# Patient Record
Sex: FEMALE | Race: BLACK OR AFRICAN AMERICAN | Employment: FULL TIME | URBAN - METROPOLITAN AREA
[De-identification: names, ages, dates, MRNs, and addresses within clinical notes are randomized per-mention and may not be internally consistent; named-entity substitution may affect disease eponyms.]

---

## 2017-03-14 ENCOUNTER — APPOINTMENT (OUTPATIENT)
Dept: GENERAL RADIOLOGY | Age: 22
End: 2017-03-14
Attending: PHYSICIAN ASSISTANT
Payer: COMMERCIAL

## 2017-03-14 ENCOUNTER — HOSPITAL ENCOUNTER (EMERGENCY)
Age: 22
Discharge: HOME | End: 2017-03-15
Attending: EMERGENCY MEDICINE
Payer: COMMERCIAL

## 2017-03-14 ENCOUNTER — APPOINTMENT (OUTPATIENT)
Dept: CT IMAGING | Age: 22
End: 2017-03-14
Attending: PHYSICIAN ASSISTANT
Payer: COMMERCIAL

## 2017-03-14 VITALS
WEIGHT: 192 LBS | TEMPERATURE: 97.7 F | RESPIRATION RATE: 20 BRPM | SYSTOLIC BLOOD PRESSURE: 110 MMHG | BODY MASS INDEX: 30.86 KG/M2 | OXYGEN SATURATION: 95 % | DIASTOLIC BLOOD PRESSURE: 61 MMHG | HEART RATE: 88 BPM | HEIGHT: 66 IN

## 2017-03-14 DIAGNOSIS — R10.13 ABDOMINAL PAIN, EPIGASTRIC: ICD-10-CM

## 2017-03-14 DIAGNOSIS — K52.9 ENTERITIS: Primary | ICD-10-CM

## 2017-03-14 DIAGNOSIS — R11.2 NON-INTRACTABLE VOMITING WITH NAUSEA, UNSPECIFIED VOMITING TYPE: ICD-10-CM

## 2017-03-14 LAB
ALBUMIN SERPL BCP-MCNC: 4 G/DL (ref 3.5–5)
ALBUMIN/GLOB SERPL: 0.9 {RATIO} (ref 1.1–2.2)
ALP SERPL-CCNC: 84 U/L (ref 45–117)
ALT SERPL-CCNC: 32 U/L (ref 12–78)
AMORPH CRY URNS QL MICRO: ABNORMAL
ANION GAP BLD CALC-SCNC: 10 MMOL/L (ref 5–15)
APPEARANCE UR: ABNORMAL
AST SERPL W P-5'-P-CCNC: 21 U/L (ref 15–37)
BACTERIA URNS QL MICRO: ABNORMAL /HPF
BASOPHILS # BLD AUTO: 0 K/UL (ref 0–0.1)
BASOPHILS # BLD: 0 % (ref 0–1)
BILIRUB SERPL-MCNC: 0.6 MG/DL (ref 0.2–1)
BILIRUB UR QL: NEGATIVE
BUN SERPL-MCNC: 19 MG/DL (ref 6–20)
BUN/CREAT SERPL: 24 (ref 12–20)
CALCIUM SERPL-MCNC: 8.8 MG/DL (ref 8.5–10.1)
CHLORIDE SERPL-SCNC: 102 MMOL/L (ref 97–108)
CO2 SERPL-SCNC: 27 MMOL/L (ref 21–32)
COLOR UR: ABNORMAL
CREAT SERPL-MCNC: 0.8 MG/DL (ref 0.55–1.02)
EOSINOPHIL # BLD: 0.1 K/UL (ref 0–0.4)
EOSINOPHIL NFR BLD: 1 % (ref 0–7)
EPITH CASTS URNS QL MICRO: ABNORMAL /LPF
ERYTHROCYTE [DISTWIDTH] IN BLOOD BY AUTOMATED COUNT: 13.1 % (ref 11.5–14.5)
GLOBULIN SER CALC-MCNC: 4.6 G/DL (ref 2–4)
GLUCOSE SERPL-MCNC: 96 MG/DL (ref 65–100)
GLUCOSE UR STRIP.AUTO-MCNC: NEGATIVE MG/DL
HCG UR QL: NEGATIVE
HCT VFR BLD AUTO: 44.4 % (ref 35–47)
HEMOCCULT STL QL: POSITIVE
HGB BLD-MCNC: 13.8 G/DL (ref 11.5–16)
HGB UR QL STRIP: NEGATIVE
KETONES UR QL STRIP.AUTO: 15 MG/DL
LEUKOCYTE ESTERASE UR QL STRIP.AUTO: ABNORMAL
LIPASE SERPL-CCNC: 122 U/L (ref 73–393)
LYMPHOCYTES # BLD AUTO: 9 % (ref 12–49)
LYMPHOCYTES # BLD: 1.1 K/UL (ref 0.8–3.5)
MCH RBC QN AUTO: 26.8 PG (ref 26–34)
MCHC RBC AUTO-ENTMCNC: 31.1 G/DL (ref 30–36.5)
MCV RBC AUTO: 86.4 FL (ref 80–99)
MONOCYTES # BLD: 0.9 K/UL (ref 0–1)
MONOCYTES NFR BLD AUTO: 7 % (ref 5–13)
NEUTS SEG # BLD: 9.8 K/UL (ref 1.8–8)
NEUTS SEG NFR BLD AUTO: 83 % (ref 32–75)
NITRITE UR QL STRIP.AUTO: NEGATIVE
PH UR STRIP: 7.5 [PH] (ref 5–8)
PLATELET # BLD AUTO: 252 K/UL (ref 150–400)
POTASSIUM SERPL-SCNC: 3.9 MMOL/L (ref 3.5–5.1)
PROT SERPL-MCNC: 8.6 G/DL (ref 6.4–8.2)
PROT UR STRIP-MCNC: 30 MG/DL
RBC # BLD AUTO: 5.14 M/UL (ref 3.8–5.2)
RBC #/AREA URNS HPF: ABNORMAL /HPF (ref 0–5)
SODIUM SERPL-SCNC: 139 MMOL/L (ref 136–145)
SP GR UR REFRACTOMETRY: 1.03 (ref 1–1.03)
UA: UC IF INDICATED,UAUC: ABNORMAL
UROBILINOGEN UR QL STRIP.AUTO: 1 EU/DL (ref 0.2–1)
WBC # BLD AUTO: 11.8 K/UL (ref 3.6–11)
WBC URNS QL MICRO: ABNORMAL /HPF (ref 0–4)

## 2017-03-14 PROCEDURE — 85025 COMPLETE CBC W/AUTO DIFF WBC: CPT | Performed by: PHYSICIAN ASSISTANT

## 2017-03-14 PROCEDURE — 87086 URINE CULTURE/COLONY COUNT: CPT | Performed by: EMERGENCY MEDICINE

## 2017-03-14 PROCEDURE — 81001 URINALYSIS AUTO W/SCOPE: CPT | Performed by: EMERGENCY MEDICINE

## 2017-03-14 PROCEDURE — 96374 THER/PROPH/DIAG INJ IV PUSH: CPT

## 2017-03-14 PROCEDURE — 80053 COMPREHEN METABOLIC PANEL: CPT | Performed by: PHYSICIAN ASSISTANT

## 2017-03-14 PROCEDURE — 96375 TX/PRO/DX INJ NEW DRUG ADDON: CPT

## 2017-03-14 PROCEDURE — 74011250636 HC RX REV CODE- 250/636: Performed by: PHYSICIAN ASSISTANT

## 2017-03-14 PROCEDURE — C9113 INJ PANTOPRAZOLE SODIUM, VIA: HCPCS | Performed by: PHYSICIAN ASSISTANT

## 2017-03-14 PROCEDURE — 36415 COLL VENOUS BLD VENIPUNCTURE: CPT | Performed by: PHYSICIAN ASSISTANT

## 2017-03-14 PROCEDURE — 99283 EMERGENCY DEPT VISIT LOW MDM: CPT

## 2017-03-14 PROCEDURE — 74020 XR ABD FLAT/ ERECT: CPT

## 2017-03-14 PROCEDURE — 74011636320 HC RX REV CODE- 636/320: Performed by: RADIOLOGY

## 2017-03-14 PROCEDURE — 96361 HYDRATE IV INFUSION ADD-ON: CPT

## 2017-03-14 PROCEDURE — 83690 ASSAY OF LIPASE: CPT | Performed by: PHYSICIAN ASSISTANT

## 2017-03-14 PROCEDURE — 82272 OCCULT BLD FECES 1-3 TESTS: CPT | Performed by: PHYSICIAN ASSISTANT

## 2017-03-14 PROCEDURE — 74011000250 HC RX REV CODE- 250: Performed by: PHYSICIAN ASSISTANT

## 2017-03-14 PROCEDURE — 81025 URINE PREGNANCY TEST: CPT

## 2017-03-14 PROCEDURE — 74177 CT ABD & PELVIS W/CONTRAST: CPT

## 2017-03-14 RX ORDER — MECLIZINE HYDROCHLORIDE 25 MG/1
25 TABLET ORAL
COMMUNITY
End: 2018-02-03

## 2017-03-14 RX ORDER — ONDANSETRON 4 MG/1
4 TABLET, FILM COATED ORAL
COMMUNITY
End: 2017-03-15

## 2017-03-14 RX ORDER — MORPHINE SULFATE 4 MG/ML
4 INJECTION, SOLUTION INTRAMUSCULAR; INTRAVENOUS
Status: COMPLETED | OUTPATIENT
Start: 2017-03-14 | End: 2017-03-14

## 2017-03-14 RX ORDER — ONDANSETRON 2 MG/ML
4 INJECTION INTRAMUSCULAR; INTRAVENOUS
Status: COMPLETED | OUTPATIENT
Start: 2017-03-14 | End: 2017-03-14

## 2017-03-14 RX ORDER — PANTOPRAZOLE SODIUM 40 MG/10ML
40 INJECTION, POWDER, LYOPHILIZED, FOR SOLUTION INTRAVENOUS
Status: COMPLETED | OUTPATIENT
Start: 2017-03-14 | End: 2017-03-14

## 2017-03-14 RX ORDER — FAMOTIDINE 10 MG/ML
20 INJECTION INTRAVENOUS
Status: COMPLETED | OUTPATIENT
Start: 2017-03-14 | End: 2017-03-14

## 2017-03-14 RX ORDER — METOCLOPRAMIDE HYDROCHLORIDE 5 MG/ML
10 INJECTION INTRAMUSCULAR; INTRAVENOUS
Status: COMPLETED | OUTPATIENT
Start: 2017-03-14 | End: 2017-03-14

## 2017-03-14 RX ORDER — IBUPROFEN 200 MG
400 TABLET ORAL
COMMUNITY
End: 2017-03-15

## 2017-03-14 RX ORDER — CETIRIZINE HCL 10 MG
10 TABLET ORAL
COMMUNITY
End: 2018-02-03

## 2017-03-14 RX ADMIN — METOCLOPRAMIDE 10 MG: 5 INJECTION, SOLUTION INTRAMUSCULAR; INTRAVENOUS at 22:52

## 2017-03-14 RX ADMIN — PANTOPRAZOLE SODIUM 40 MG: 40 INJECTION, POWDER, FOR SOLUTION INTRAVENOUS at 22:45

## 2017-03-14 RX ADMIN — IOPAMIDOL 97 ML: 755 INJECTION, SOLUTION INTRAVENOUS at 23:44

## 2017-03-14 RX ADMIN — SODIUM CHLORIDE 1000 ML: 900 INJECTION, SOLUTION INTRAVENOUS at 21:24

## 2017-03-14 RX ADMIN — FAMOTIDINE 20 MG: 10 INJECTION, SOLUTION INTRAVENOUS at 21:24

## 2017-03-14 RX ADMIN — ONDANSETRON 4 MG: 2 INJECTION INTRAMUSCULAR; INTRAVENOUS at 21:24

## 2017-03-14 RX ADMIN — Medication 4 MG: at 21:24

## 2017-03-14 NOTE — LETTER
[address] 
[facility] EMERGENCY DEPT 
[address] [facility] [id number] 
[phone] Work/School Note Date: 3/14/2017 To Whom It May concern: 
 
Valente Fragoso was seen and treated today in the emergency room by the following provider(s): 
Attending Provider: Vanessa Infante MD 
Physician Assistant: Mindy Kulkarni PA-C. Valente Fragoso may return to work on 3/17/2017. Sincerely, Mindy Kulkarni PA-C

## 2017-03-15 PROCEDURE — 74011250637 HC RX REV CODE- 250/637: Performed by: PHYSICIAN ASSISTANT

## 2017-03-15 RX ORDER — FAMOTIDINE 20 MG/1
20 TABLET, FILM COATED ORAL 2 TIMES DAILY
Qty: 60 TAB | Refills: 0 | Status: SHIPPED | OUTPATIENT
Start: 2017-03-15 | End: 2017-04-14

## 2017-03-15 RX ORDER — METOCLOPRAMIDE 10 MG/1
10 TABLET ORAL
Qty: 12 TAB | Refills: 0 | Status: SHIPPED | OUTPATIENT
Start: 2017-03-15 | End: 2017-03-25

## 2017-03-15 RX ORDER — CIPROFLOXACIN 500 MG/1
500 TABLET ORAL
Status: COMPLETED | OUTPATIENT
Start: 2017-03-15 | End: 2017-03-15

## 2017-03-15 RX ORDER — CIPROFLOXACIN 500 MG/1
500 TABLET ORAL 2 TIMES DAILY
Qty: 14 TAB | Refills: 0 | Status: SHIPPED | OUTPATIENT
Start: 2017-03-15 | End: 2017-03-22

## 2017-03-15 RX ORDER — DICYCLOMINE HYDROCHLORIDE 20 MG/1
20 TABLET ORAL EVERY 6 HOURS
Qty: 20 TAB | Refills: 0 | Status: SHIPPED | OUTPATIENT
Start: 2017-03-15 | End: 2017-03-21

## 2017-03-15 RX ADMIN — CIPROFLOXACIN HYDROCHLORIDE 500 MG: 500 TABLET, FILM COATED ORAL at 00:22

## 2017-03-15 NOTE — DISCHARGE INSTRUCTIONS
We hope that we have addressed all of your medical concerns. The examination and treatment you received in the Emergency Department were for an emergent problem and were not intended as complete care. It is important that you follow up with your healthcare provider(s) for ongoing care. If your symptoms worsen or do not improve as expected, and you are unable to reach your usual health care provider(s), you should return to the Emergency Department. Today's healthcare is undergoing tremendous change, and patient satisfaction surveys are one of the many tools to assess the quality of medical care. You may receive a survey from the [employer] regarding your experience in the Emergency Department. I hope that your experience has been completely positive, particularly the medical care that I provided. As such, please participate in the survey; anything less than excellent does not meet my expectations or intentions. [facility] and [employer] participate in nationally recognized quality of care measures. If your blood pressure is greater than 120/80, as reported below, we urge that you seek medical care to address the potential of high blood pressure, commonly known as hypertension. Hypertension can be hereditary or can be caused by certain medical conditions, pain, stress, or \"white coat syndrome. \"       Please make an appointment with your health care provider(s) for follow up of your Emergency Department visit. VITALS:   Patient Vitals for the past 8 hrs:   Temp Pulse Resp BP SpO2   03/14/17 2300 - - - 110/61 95 %   03/14/17 2038 97.7 °F (36.5 °C) 88 20 127/74 98 %          Thank you for allowing us to provide you with medical care today. We realize that you have many choices for your emergency care needs. Please choose us in the future for any continued health care needs. [facility].   Office: [phone]            Recent Results (from the past 24 hour(s))   URINALYSIS W/ REFLEX CULTURE    Collection Time: 03/14/17  9:06 PM   Result Value Ref Range    Color YELLOW/STRAW      Appearance CLOUDY (A) CLEAR      Specific gravity 1.027 1.003 - 1.030      pH (UA) 7.5 5.0 - 8.0      Protein 30 (A) NEG mg/dL    Glucose NEGATIVE  NEG mg/dL    Ketone 15 (A) NEG mg/dL    Bilirubin NEGATIVE  NEG      Blood NEGATIVE  NEG      Urobilinogen 1.0 0.2 - 1.0 EU/dL    Nitrites NEGATIVE  NEG      Leukocyte Esterase SMALL (A) NEG      WBC 5-10 0 - 4 /hpf    RBC 0-5 0 - 5 /hpf    Epithelial cells FEW FEW /lpf    Bacteria 1+ (A) NEG /hpf    UA:UC IF INDICATED URINE CULTURE ORDERED (A) CNI      Amorphous Crystals 1+ (A) NEG   CBC WITH AUTOMATED DIFF    Collection Time: 03/14/17  9:06 PM   Result Value Ref Range    WBC 11.8 (H) 3.6 - 11.0 K/uL    RBC 5.14 3.80 - 5.20 M/uL    HGB 13.8 11.5 - 16.0 g/dL    HCT 44.4 35.0 - 47.0 %    MCV 86.4 80.0 - 99.0 FL    MCH 26.8 26.0 - 34.0 PG    MCHC 31.1 30.0 - 36.5 g/dL    RDW 13.1 11.5 - 14.5 %    PLATELET 432 282 - 394 K/uL    NEUTROPHILS 83 (H) 32 - 75 %    LYMPHOCYTES 9 (L) 12 - 49 %    MONOCYTES 7 5 - 13 %    EOSINOPHILS 1 0 - 7 %    BASOPHILS 0 0 - 1 %    ABS. NEUTROPHILS 9.8 (H) 1.8 - 8.0 K/UL    ABS. LYMPHOCYTES 1.1 0.8 - 3.5 K/UL    ABS. MONOCYTES 0.9 0.0 - 1.0 K/UL    ABS. EOSINOPHILS 0.1 0.0 - 0.4 K/UL    ABS.  BASOPHILS 0.0 0.0 - 0.1 K/UL   METABOLIC PANEL, COMPREHENSIVE    Collection Time: 03/14/17  9:06 PM   Result Value Ref Range    Sodium 139 136 - 145 mmol/L    Potassium 3.9 3.5 - 5.1 mmol/L    Chloride 102 97 - 108 mmol/L    CO2 27 21 - 32 mmol/L    Anion gap 10 5 - 15 mmol/L    Glucose 96 65 - 100 mg/dL    BUN 19 6 - 20 MG/DL    Creatinine 0.80 0.55 - 1.02 MG/DL    BUN/Creatinine ratio 24 (H) 12 - 20      GFR est AA >60 >60 ml/min/1.73m2    GFR est non-AA >60 >60 ml/min/1.73m2    Calcium 8.8 8.5 - 10.1 MG/DL    Bilirubin, total 0.6 0.2 - 1.0 MG/DL    ALT (SGPT) 32 12 - 78 U/L    AST (SGOT) 21 15 - 37 U/L    Alk. phosphatase 84 45 - 117 U/L    Protein, total 8.6 (H) 6.4 - 8.2 g/dL    Albumin 4.0 3.5 - 5.0 g/dL    Globulin 4.6 (H) 2.0 - 4.0 g/dL    A-G Ratio 0.9 (L) 1.1 - 2.2     LIPASE    Collection Time: 03/14/17  9:06 PM   Result Value Ref Range    Lipase 122 73 - 393 U/L   OCCULT BLOOD, STOOL    Collection Time: 03/14/17  9:14 PM   Result Value Ref Range    Occult blood, stool POSITIVE (A) NEG     HCG URINE, QL. - POC    Collection Time: 03/14/17  9:36 PM   Result Value Ref Range    Pregnancy test,urine (POC) NEGATIVE  NEG         Xr Abd Flat/ Erect    Result Date: 3/14/2017  CLINICAL HISTORY: Abdominal pain INDICATION: Bilateral lower quadrant pain with vomiting for one day. FINDINGS: AP flat and upright views of the abdomen are obtained. There is no organomegaly abnormal mass or calcification. No obstruction. No free intraperitoneal air. Fecal stasis. IMPRESSION: No obstruction or free intraperitoneal air. Ct Abd Pelv W Cont    Result Date: 3/14/2017  History: Abdominal pain, nausea, INDICATION: worsening abd pain, nausea COMPARISON: 12/1/2016 TECHNIQUE: Following the uneventful intravenous administration of 100 cc Isovue-370, thin axial images were obtained through the abdomen and pelvis. Coronal and sagittal reconstructions were generated. Oral contrast was not administered. CT dose reduction was achieved through use of a standardized protocol tailored for this examination and automatic exposure control for dose modulation. FINDINGS: FINDINGS: CRITICAL FINDINGS: There are fluid-filled loops of both large and small bowel with mild hyperemia of bowel loops. There is no obstruction. There is a normal appendix. INCIDENTALS: Trace free pelvic fluid. LIVER: No mass or biliary dilatation. There is no intrahepatic duct dilatation. The CBD is not dilated. There is no hepatic parenchymal mass. Hepatic enhancement pattern is within normal limits. SPLEEN/PANCREAS: No mass. There is no pancreatic duct dilatation. There is no evidence of splenomegaly. No focal ADRENALS/KIDNEYS: No mass, calculus, or hydronephrosis. there is no hydroureter or hydronephrosis. There is no renal mass. There is no perinephric mass. COLON AND SMALL BOWEL: No dilatation or wall thickening. There is no obstruction or free intraperitoneal air. There is no evidence of incarceration or obstruction. APPENDIX: Unremarkable. URINARY BLADDER: No mass or calculus. BONES: No destructive bone lesion. IMPRESSION: Findings are consistent with a mild enteritis. Normal appendix. Gastroenteritis: Care Instructions  Your Care Instructions  Gastroenteritis is an illness that may cause nausea, vomiting, and diarrhea. It is sometimes called \"stomach flu. \" It can be caused by bacteria or a virus. You will probably begin to feel better in 1 to 2 days. In the meantime, get plenty of rest and make sure you do not become dehydrated. Dehydration occurs when your body loses too much fluid. Follow-up care is a key part of your treatment and safety. Be sure to make and go to all appointments, and call your doctor if you are having problems. Its also a good idea to know your test results and keep a list of the medicines you take. How can you care for yourself at home? · If your doctor prescribed antibiotics, take them as directed. Do not stop taking them just because you feel better. You need to take the full course of antibiotics. · Drink plenty of fluids to prevent dehydration, enough so that your urine is light yellow or clear like water. Choose water and other caffeine-free clear liquids until you feel better. If you have kidney, heart, or liver disease and have to limit fluids, talk with your doctor before you increase your fluid intake. · Drink fluids slowly, in frequent, small amounts, because drinking too much too fast can cause vomiting.   · Begin eating mild foods, such as dry toast, yogurt, applesauce, bananas, and rice. Avoid spicy, hot, or high-fat foods, and do not drink alcohol or caffeine for a day or two. Do not drink milk or eat ice cream until you are feeling better. How to prevent gastroenteritis  · Keep hot foods hot and cold foods cold. · Do not eat meats, dressings, salads, or other foods that have been kept at room temperature for more than 2 hours. · Use a thermometer to check your refrigerator. It should be between 34°F and 40°F.  · Defrost meats in the refrigerator or microwave, not on the kitchen counter. · Keep your hands and your kitchen clean. Wash your hands, cutting boards, and countertops with hot soapy water frequently. · Cook meat until it is well done. · Do not eat raw eggs or uncooked sauces made with raw eggs. · Do not take chances. If food looks or tastes spoiled, throw it out. When should you call for help? Call 911 anytime you think you may need emergency care. For example, call if:  · You vomit blood or what looks like coffee grounds. · You passed out (lost consciousness). · You pass maroon or very bloody stools. Call your doctor now or seek immediate medical care if:  · You have severe belly pain. · You have signs of needing more fluids. You have sunken eyes, a dry mouth, and pass only a little dark urine. · You feel like you are going to faint. · You have increased belly pain that does not go away in 1 to 2 days. · You have new or increased nausea, or you are vomiting. · You have a new or higher fever. · Your stools are black and tarlike or have streaks of blood. Watch closely for changes in your health, and be sure to contact your doctor if:  · You are dizzy or lightheaded. · You urinate less than usual, or your urine is dark yellow or brown. · You do not feel better with each day that goes by. Where can you learn more? Go to http://daisy.info/.   Enter N142 in the search box to learn more about \"Gastroenteritis: Care Instructions. \"  Current as of: May 24, 2016  Content Version: 11.1  © 5760-9582 [employer]. Care instructions adapted under license by [employer] (which disclaims liability or warranty for this information). If you have questions about a medical condition or this instruction, always ask your healthcare professional. [address] any warranty or liability for your use of this information. Indigestion (Dyspepsia or Heartburn): Care Instructions  Your Care Instructions  Sometimes it can be hard to pinpoint the cause of indigestion (dyspepsia or heartburn). Most cases of an upset stomach with bloating, burning, burping, and nausea are minor and go away within several hours. Home treatment and over-the-counter medicine often are able to control symptoms. But if you take medicine to relieve your indigestion without making diet and lifestyle changes, your symptoms are likely to return again and again. If you get indigestion often, it may be a sign of a more serious medical problem. Be sure to follow up with your doctor, who may want to do tests to be sure of the cause of your indigestion. Follow-up care is a key part of your treatment and safety. Be sure to make and go to all appointments, and call your doctor if you are having problems. Its also a good idea to know your test results and keep a list of the medicines you take. How can you care for yourself at home? · Your doctor may recommend over-the-counter medicine. For mild or occasional indigestion, antacids such as Tums, Gaviscon, Mylanta, or Maalox may help. Your doctor also may recommend over-the-counter acid reducers, such as Pepcid AC, Tagamet HB, Zantac 75, or Prilosec. Read and follow all instructions on the label. If you use these medicines often, talk with your doctor. · Change your eating habits.   ¨ Its best to eat several small meals instead of two or three large meals.  ¨ After you eat, wait 2 to 3 hours before you lie down. ¨ Chocolate, mint, and alcohol can make GERD worse. ¨ Spicy foods, foods that have a lot of acid (like tomatoes and oranges), and coffee can make GERD symptoms worse in some people. If your symptoms are worse after you eat a certain food, you may want to stop eating that food to see if your symptoms get better. · Do not smoke or chew tobacco. Smoking can make GERD worse. If you need help quitting, talk to your doctor about stop-smoking programs and medicines. These can increase your chances of quitting for good. · If you have GERD symptoms at night, raise the head of your bed 6 to 8 inches by putting the frame on blocks or placing a foam wedge under the head of your mattress. (Adding extra pillows does not work.)  · Do not wear tight clothing around your middle. · Lose weight if you need to. Losing just 5 to 10 pounds can help. · Do not take anti-inflammatory medicines, such as aspirin, ibuprofen (Advil, Motrin), or naproxen (Aleve). These can irritate the stomach. If you need a pain medicine, try acetaminophen (Tylenol), which does not cause stomach upset. When should you call for help? Call 911 anytime you think you may need emergency care. For example, call if:  · You passed out (lost consciousness). · You vomit blood or what looks like coffee grounds. · You pass maroon or very bloody stools. · You have chest pain or pressure. This may occur with:  ¨ Sweating. ¨ Shortness of breath. ¨ Nausea or vomiting. ¨ Pain that spreads from the chest to the neck, jaw, or one or both shoulders or arms. ¨ Feeling dizzy or lightheaded. ¨ A fast or uneven pulse. After calling 911, chew 1 adult-strength aspirin. Wait for an ambulance. Do not try to drive yourself. Call your doctor now or seek immediate medical care if:  · You have severe belly pain. · Your stools are black and tarlike or have streaks of blood. · You have trouble swallowing.   · You are losing weight and do not know why. Watch closely for changes in your health, and be sure to contact your doctor if:  · You do not get better as expected. Where can you learn more? Go to http://lyric-jamarcus.info/. Enter H415 in the search box to learn more about \"Indigestion (Dyspepsia or Heartburn): Care Instructions. \"  Current as of: August 9, 2016  Content Version: 11.1  © 3836-9073 [employer]. Care instructions adapted under license by [employer] (which disclaims liability or warranty for this information). If you have questions about a medical condition or this instruction, always ask your healthcare professional. [address] any warranty or liability for your use of this information.

## 2017-03-15 NOTE — PROGRESS NOTES
[facility]: MED RECONCILIATION    Information obtained from: patient, patient's mother    Significant PMH/Disease States:   Past Medical History:   Diagnosis Date    Acid reflux     Asthma     Bladder incontinence     Endocrine disease     Insulin resistance    Headache(784.0)     Irregular menstrual cycle     Vertigo      Chief Complaint for this Admission:   Chief Complaint   Patient presents with    Vomiting    Abdominal Pain     Allergies: Latex; Latex; Banana; Banana; Kiwi; Kiwi; Nut flavor; Peanut; Strawberry; and Tree nuts    Prior to Admission Medications:     Medication Documentation Review Audit       Reviewed by KALPANA LinaresD (Pharmacist) on 03/14/17 at 2224         Medication Sig Documenting Provider Last Dose Status Taking? albuterol (PROVENTIL HFA, VENTOLIN HFA, PROAIR HFA) 90 mcg/actuation inhaler Take 2 Puffs by inhalation every four (4) hours as needed for Wheezing. Agustin Martinez MD  Active Yes    cetirizine (ZYRTEC) 10 mg tablet Take 10 mg by mouth daily as needed for Allergies. Historical Provider fall 2016 Active Yes    ibuprofen (MOTRIN) 200 mg tablet Take 400 mg by mouth every eight (8) hours as needed for Pain. Historical Provider 2/14/2017 Unknown time Active Yes    meclizine (DRAMAMINE LESS DROWSY) 25 mg tablet Take 25 mg by mouth daily as needed. Historical Provider 2/14/2017 Unknown time Active Yes    ondansetron hcl (ZOFRAN) 4 mg tablet Take 4 mg by mouth daily as needed for Nausea. Historical Provider 3/14/2017 Unknown time Active Yes                  Thank you for the consult,  Agustin JOSE [facility]

## 2017-03-15 NOTE — ED PROVIDER NOTES
HPI Comments: 25 y.o. female with past medical history significant for asthma, acid reflux, vertigo, and insulin resistance who presents to the ED with chief complaint of vomiting. Pt reports this afternoon at work she began feeling nauseated and not feeling well. Pt reports she went home and at approximately 1700 she began vomiting. Pt states she has had 9 episodes of vomiting since and noted bright red blood in her emesis the last few episodes. Pt states after vomiting she began having generalized upper abdominal pain and mid back pain wrapping around to her stomach. Pt states her pain is constant and is exacerbated by movement and vomiting. Pt states she also feels constipated, says her last BM was 2 days ago. Pt denies hx of similar sx. Pt states she tried to take Zofran but vomited immediately. Pt states every time she drinks water she vomits immediately. Pt states she works at [employer] and has had many recent ill contacts. Pt denies diarrhea or blood in stool. There are no other acute medical complaints voiced at this time. Social Hx: Works at [employer]. PCP: Melissa Goltz, MD    Note written by Shirley Winston, as dictated by AROLDO Mae Caro 9:42 PM     The history is provided by the patient. Past Medical History:   Diagnosis Date    Acid reflux     Asthma     Bladder incontinence     Endocrine disease     Insulin resistance    Headache(784.0)     Irregular menstrual cycle     Vertigo        No past surgical history on file.       Family History:   Problem Relation Age of Onset   [address] [facility] Arthritis-osteo Mother     Asthma Mother     Headache Mother      migraines    Anxiety Mother     Hypertension Maternal Grandmother     Hypertension Maternal Grandfather     Cancer Maternal Grandfather      prostate    Hypertension Paternal Grandmother     Diabetes Paternal Grandmother     Thyroid Disease Paternal Grandmother     Hypertension Paternal Grandfather     Diabetes Paternal Grandfather  Thyroid Disease Paternal Grandfather        Social History     Social History    Marital status: SINGLE     Spouse name: N/A    Number of children: N/A    Years of education: N/A     Occupational History    Not on file. Social History Main Topics    Smoking status: Never Smoker    Smokeless tobacco: Never Used    Alcohol use No    Drug use: No    Sexual activity: No     Other Topics Concern    Not on file     Social History Narrative    ** Merged History Encounter **         ** Merged History Encounter **              ALLERGIES: Latex; Latex; Banana; Banana; Kiwi; Kiwi; Nut flavor; Peanut; Strawberry; and Tree nuts    Review of Systems   Constitutional: Positive for appetite change (unable to tolerate PO intake). Negative for chills, fatigue and fever. HENT: Negative. Negative for congestion and sore throat. Eyes: Negative. Negative for visual disturbance. Respiratory: Negative. Negative for cough and shortness of breath. Cardiovascular: Negative. Negative for chest pain, palpitations and leg swelling. Gastrointestinal: Positive for abdominal pain (upper), constipation, nausea and vomiting. Negative for blood in stool and diarrhea. Genitourinary: Negative. Negative for dysuria, flank pain and hematuria. Musculoskeletal: Positive for back pain (mid back pain radiating to stomach). Negative for neck pain. Skin: Negative. Negative for rash. Neurological: Negative. Negative for dizziness, syncope, weakness, numbness and headaches. Hematological: Negative. Psychiatric/Behavioral: Negative. All other systems reviewed and are negative. Vitals:    03/14/17 2038 03/14/17 2300   BP: 127/74 110/61   Pulse: 88    Resp: 20    Temp: 97.7 °F (36.5 °C)    SpO2: 98% 95%   Weight: 87.1 kg (192 lb)    Height: 5' 6\" (1.676 m)             Physical Exam   Constitutional: She is oriented to person, place, and time. She appears well-developed and well-nourished.  She appears distressed (uncomfortable appearing). HENT:   Head: Normocephalic. Mouth/Throat: Oropharynx is clear and moist.   Neck: Normal range of motion. Cardiovascular: Normal rate, S1 normal, S2 normal, normal heart sounds and normal pulses. Exam reveals no gallop and no friction rub. No murmur heard. Pulses:       Dorsalis pedis pulses are 2+ on the right side, and 2+ on the left side. Pulmonary/Chest: Effort normal and breath sounds normal. No accessory muscle usage. No respiratory distress. She has no decreased breath sounds. She has no wheezes. She has no rhonchi. She has no rales. Abdominal: Soft. Normal appearance and bowel sounds are normal. She exhibits no distension. There is no hepatosplenomegaly. There is tenderness (generalized TTP, inc epigastric). There is no rebound, no guarding and no CVA tenderness. Genitourinary: Rectal exam shows guaiac positive stool (brown stool; guaiac positive, control appropriate). Musculoskeletal: Normal range of motion. She exhibits no edema or tenderness. Neurological: She is alert and oriented to person, place, and time. She has normal strength. No sensory deficit. Skin: Skin is warm and dry. No rash noted. She is not diaphoretic. No erythema. No pallor. Psychiatric: She has a normal mood and affect. Her behavior is normal.   Nursing note and vitals reviewed.        MDM  Number of Diagnoses or Management Options  Abdominal pain, epigastric:   Enteritis:   Non-intractable vomiting with nausea, unspecified vomiting type:   Diagnosis management comments: DDx: gastroenteritis, GERD, electrolyte abnormality, colitis       Amount and/or Complexity of Data Reviewed  Clinical lab tests: ordered and reviewed  Tests in the radiology section of CPT®: ordered and reviewed  Obtain history from someone other than the patient: yes (Mother )  Review and summarize past medical records: yes  Discuss the patient with other providers: yes (Dr. Dudley Has)    Patient Progress  Patient progress: stable    ED Course       Procedures            Procedure Note - Rectal Exam:   9:42 PM  Performed by: Minor Garcia PA-C   Chaperoned by: Sheri Rodriguez RN  Rectal exam performed. Brown stool was collected. Stool was Hemoccult tested, and found to be heme Positive. The procedure took 1-15 minutes, and pt tolerated well.      9:42 PM   Minor Garcia PA-C discussed patient with Christal Castillo MD  who is in agreement with care plan as outlined. No further recommendations. Minor Garcia PA-C      12:43 AM  Pt has been reevaluated. There are no new complaints, changes, or physical findings at this time. Medications have been reviewed w/ pt and/or family. Pt and/or family's questions have been answered. Pt and/or family expressed good understanding of the dx/tx/rx and is in agreement with plan of care. Pt instructed and agreed to f/u w/ PCP, GI and to return to ED upon further deterioration. Pt is ready for discharge.     LABORATORY TESTS:  Recent Results (from the past 12 hour(s))   URINALYSIS W/ REFLEX CULTURE    Collection Time: 03/14/17  9:06 PM   Result Value Ref Range    Color YELLOW/STRAW      Appearance CLOUDY (A) CLEAR      Specific gravity 1.027 1.003 - 1.030      pH (UA) 7.5 5.0 - 8.0      Protein 30 (A) NEG mg/dL    Glucose NEGATIVE  NEG mg/dL    Ketone 15 (A) NEG mg/dL    Bilirubin NEGATIVE  NEG      Blood NEGATIVE  NEG      Urobilinogen 1.0 0.2 - 1.0 EU/dL    Nitrites NEGATIVE  NEG      Leukocyte Esterase SMALL (A) NEG      WBC 5-10 0 - 4 /hpf    RBC 0-5 0 - 5 /hpf    Epithelial cells FEW FEW /lpf    Bacteria 1+ (A) NEG /hpf    UA:UC IF INDICATED URINE CULTURE ORDERED (A) CNI      Amorphous Crystals 1+ (A) NEG   CBC WITH AUTOMATED DIFF    Collection Time: 03/14/17  9:06 PM   Result Value Ref Range    WBC 11.8 (H) 3.6 - 11.0 K/uL    RBC 5.14 3.80 - 5.20 M/uL    HGB 13.8 11.5 - 16.0 g/dL    HCT 44.4 35.0 - 47.0 %    MCV 86.4 80.0 - 99.0 FL    MCH 26.8 26.0 - 34.0 PG MCHC 31.1 30.0 - 36.5 g/dL    RDW 13.1 11.5 - 14.5 %    PLATELET 104 102 - 169 K/uL    NEUTROPHILS 83 (H) 32 - 75 %    LYMPHOCYTES 9 (L) 12 - 49 %    MONOCYTES 7 5 - 13 %    EOSINOPHILS 1 0 - 7 %    BASOPHILS 0 0 - 1 %    ABS. NEUTROPHILS 9.8 (H) 1.8 - 8.0 K/UL    ABS. LYMPHOCYTES 1.1 0.8 - 3.5 K/UL    ABS. MONOCYTES 0.9 0.0 - 1.0 K/UL    ABS. EOSINOPHILS 0.1 0.0 - 0.4 K/UL    ABS. BASOPHILS 0.0 0.0 - 0.1 K/UL   METABOLIC PANEL, COMPREHENSIVE    Collection Time: 03/14/17  9:06 PM   Result Value Ref Range    Sodium 139 136 - 145 mmol/L    Potassium 3.9 3.5 - 5.1 mmol/L    Chloride 102 97 - 108 mmol/L    CO2 27 21 - 32 mmol/L    Anion gap 10 5 - 15 mmol/L    Glucose 96 65 - 100 mg/dL    BUN 19 6 - 20 MG/DL    Creatinine 0.80 0.55 - 1.02 MG/DL    BUN/Creatinine ratio 24 (H) 12 - 20      GFR est AA >60 >60 ml/min/1.73m2    GFR est non-AA >60 >60 ml/min/1.73m2    Calcium 8.8 8.5 - 10.1 MG/DL    Bilirubin, total 0.6 0.2 - 1.0 MG/DL    ALT (SGPT) 32 12 - 78 U/L    AST (SGOT) 21 15 - 37 U/L    Alk. phosphatase 84 45 - 117 U/L    Protein, total 8.6 (H) 6.4 - 8.2 g/dL    Albumin 4.0 3.5 - 5.0 g/dL    Globulin 4.6 (H) 2.0 - 4.0 g/dL    A-G Ratio 0.9 (L) 1.1 - 2.2     LIPASE    Collection Time: 03/14/17  9:06 PM   Result Value Ref Range    Lipase 122 73 - 393 U/L   OCCULT BLOOD, STOOL    Collection Time: 03/14/17  9:14 PM   Result Value Ref Range    Occult blood, stool POSITIVE (A) NEG     HCG URINE, QL. - POC    Collection Time: 03/14/17  9:36 PM   Result Value Ref Range    Pregnancy test,urine (POC) NEGATIVE  NEG         IMAGING RESULTS:  CT ABD PELV W CONT   Final Result      XR ABD FLAT/ ERECT   Final Result        Xr Abd Flat/ Erect    Result Date: 3/14/2017  CLINICAL HISTORY: Abdominal pain INDICATION: Bilateral lower quadrant pain with vomiting for one day. FINDINGS: AP flat and upright views of the abdomen are obtained. There is no organomegaly abnormal mass or calcification. No obstruction. No free intraperitoneal air. Fecal stasis. IMPRESSION: No obstruction or free intraperitoneal air. Ct Abd Pelv W Cont    Result Date: 3/14/2017  History: Abdominal pain, nausea, INDICATION: worsening abd pain, nausea COMPARISON: 12/1/2016 TECHNIQUE: Following the uneventful intravenous administration of 100 cc Isovue-370, thin axial images were obtained through the abdomen and pelvis. Coronal and sagittal reconstructions were generated. Oral contrast was not administered. CT dose reduction was achieved through use of a standardized protocol tailored for this examination and automatic exposure control for dose modulation. FINDINGS: FINDINGS: CRITICAL FINDINGS: There are fluid-filled loops of both large and small bowel with mild hyperemia of bowel loops. There is no obstruction. There is a normal appendix. INCIDENTALS: Trace free pelvic fluid. LIVER: No mass or biliary dilatation. There is no intrahepatic duct dilatation. The CBD is not dilated. There is no hepatic parenchymal mass. Hepatic enhancement pattern is within normal limits. SPLEEN/PANCREAS: No mass. There is no pancreatic duct dilatation. There is no evidence of splenomegaly. No focal ADRENALS/KIDNEYS: No mass, calculus, or hydronephrosis. there is no hydroureter or hydronephrosis. There is no renal mass. There is no perinephric mass. COLON AND SMALL BOWEL: No dilatation or wall thickening. There is no obstruction or free intraperitoneal air. There is no evidence of incarceration or obstruction. APPENDIX: Unremarkable. URINARY BLADDER: No mass or calculus. BONES: No destructive bone lesion. IMPRESSION: Findings are consistent with a mild enteritis. Normal appendix.          MEDICATIONS GIVEN:  Medications   sodium chloride 0.9 % bolus infusion 1,000 mL (0 mL IntraVENous IV Completed 3/14/17 2224)   ondansetron (ZOFRAN) injection 4 mg (4 mg IntraVENous Given 3/14/17 2124)   morphine injection 4 mg (4 mg IntraVENous Given 3/14/17 2124)   famotidine (PF) (PEPCID) injection 20 mg (20 mg IntraVENous Given 3/14/17 2124)   pantoprazole (PROTONIX) injection 40 mg (40 mg IntraVENous Given 3/14/17 2245)   metoclopramide HCl (REGLAN) injection 10 mg (10 mg IntraVENous Given 3/14/17 2252)   iopamidol (ISOVUE-370) 76 % injection 100 mL (97 mL IntraVENous Given 3/14/17 2344)   ciprofloxacin HCl (CIPRO) tablet 500 mg (500 mg Oral Given 3/15/17 0022)       IMPRESSION:  1. Enteritis    2. Non-intractable vomiting with nausea, unspecified vomiting type    3. Abdominal pain, epigastric        PLAN:  1. Current Discharge Medication List      START taking these medications    Details   metoclopramide HCl (REGLAN) 10 mg tablet Take 1 Tab by mouth every six (6) hours as needed for Nausea for up to 10 days. Qty: 12 Tab, Refills: 0      famotidine (PEPCID) 20 mg tablet Take 1 Tab by mouth two (2) times a day for 30 days. Qty: 60 Tab, Refills: 0      ciprofloxacin HCl (CIPRO) 500 mg tablet Take 1 Tab by mouth two (2) times a day for 7 days. Qty: 14 Tab, Refills: 0      dicyclomine (BENTYL) 20 mg tablet Take 1 Tab by mouth every six (6) hours for 20 doses. Qty: 20 Tab, Refills: 0         CONTINUE these medications which have NOT CHANGED    Details   cetirizine (ZYRTEC) 10 mg tablet Take 10 mg by mouth daily as needed for Allergies. meclizine (DRAMAMINE LESS DROWSY) 25 mg tablet Take 25 mg by mouth daily as needed. albuterol (PROVENTIL HFA, VENTOLIN HFA, PROAIR HFA) 90 mcg/actuation inhaler Take 2 Puffs by inhalation every four (4) hours as needed for Wheezing.   Qty: 1 Inhaler, Refills: 0         STOP taking these medications       ondansetron hcl (ZOFRAN) 4 mg tablet Comments:   Reason for Stopping:         ibuprofen (MOTRIN) 200 mg tablet Comments:   Reason for Stoppin.   Follow-up Information     Follow up With Details Comments Contact Info    Victoriano Barney MD Schedule an appointment as soon as possible for a visit in 3 days  [facility]  [address] [place] [id number]  [phone]      Your GI physician Call in 1 day call in the morning and follow up next available     [facility] EMERGENCY DEPT  If symptoms worsen [address] [facility]  [phone]            Return to ED if worse

## 2017-03-16 LAB
BACTERIA SPEC CULT: NORMAL
CC UR VC: NORMAL
SERVICE CMNT-IMP: NORMAL

## 2017-09-23 ENCOUNTER — HOSPITAL ENCOUNTER (EMERGENCY)
Age: 22
Discharge: HOME | End: 2017-09-23
Attending: EMERGENCY MEDICINE
Payer: COMMERCIAL

## 2017-09-23 VITALS
DIASTOLIC BLOOD PRESSURE: 84 MMHG | TEMPERATURE: 98.4 F | OXYGEN SATURATION: 99 % | WEIGHT: 190 LBS | HEART RATE: 74 BPM | HEIGHT: 66 IN | BODY MASS INDEX: 30.53 KG/M2 | SYSTOLIC BLOOD PRESSURE: 131 MMHG | RESPIRATION RATE: 16 BRPM

## 2017-09-23 DIAGNOSIS — M79.10 MUSCULAR PAIN: Primary | ICD-10-CM

## 2017-09-23 PROCEDURE — 99283 EMERGENCY DEPT VISIT LOW MDM: CPT

## 2017-09-23 RX ORDER — METHOCARBAMOL 500 MG/1
500 TABLET, FILM COATED ORAL 3 TIMES DAILY
Qty: 12 TAB | Refills: 0 | Status: SHIPPED | OUTPATIENT
Start: 2017-09-23 | End: 2018-02-03

## 2017-09-23 RX ORDER — IBUPROFEN 600 MG/1
600 TABLET ORAL
Qty: 15 TAB | Refills: 0 | Status: SHIPPED | OUTPATIENT
Start: 2017-09-23 | End: 2018-02-03

## 2017-09-23 NOTE — ED TRIAGE NOTES
Pt ambulates to treatment area she states that since Wednesday while laying down she began having some neck pain that as the week has gone on has radiated into her legs and feet. She has been taking some Ibuprofen and today some Tylenol which seems to lessen the pain but it is always there. She notes that she has been sick with her GI problems this week and has been laying around because of that. She also states that this past week she went to her GYN because she had some sores in her vaginal area. She was tested and everything was negative. Denies any fevers.

## 2017-09-23 NOTE — DISCHARGE INSTRUCTIONS
We hope that we have addressed all of your medical concerns. The examination and treatment you received in the Emergency Department were for an emergent problem and were not intended as complete care. It is important that you follow up with your healthcare provider(s) for ongoing care. If your symptoms worsen or do not improve as expected, and you are unable to reach your usual health care provider(s), you should return to the Emergency Department. Today's healthcare is undergoing tremendous change, and patient satisfaction surveys are one of the many tools to assess the quality of medical care. You may receive a survey from the [employer] organization regarding your experience in the Emergency Department. I hope that your experience has been completely positive, particularly the medical care that I provided. As such, please participate in the survey; anything less than excellent does not meet my expectations or intentions. [facility] and [facility] participate in nationally recognized quality of care measures. If your blood pressure is greater than 120/80, as reported below, we urge that you seek medical care to address the potential of high blood pressure, commonly known as hypertension. Hypertension can be hereditary or can be caused by certain medical conditions, pain, stress, or \"white coat syndrome. \"       Please make an appointment with your health care provider(s) for follow up of your Emergency Department visit. VITALS:   Patient Vitals for the past 8 hrs:   Temp Pulse Resp BP SpO2   09/23/17 1236 97.8 °F (36.6 °C) 88 17 (!) 133/93 97 %          Thank you for allowing us to provide you with medical care today. We realize that you have many choices for your emergency care needs. Please choose us in the future for any continued health care needs. Vimal Haile, [address] [place] [address] [address] Office: [phone]            No results found for this or any previous visit (from the past 24 hour(s)). No results found. Musculoskeletal Pain: Care Instructions  Your Care Instructions  Different problems with the bones, muscles, nerves, ligaments, and tendons in the body can cause pain. One or more areas of your body may ache or burn. Or they may feel tired, stiff, or sore. The medical term for this type of pain is musculoskeletal pain. It can have many different causes. Sometimes the pain is caused by an injury such as a strain or sprain. Or you might have pain from using one part of your body in the same way over and over again. This is called overuse. In some cases, the cause of the pain is another health problem such as arthritis or fibromyalgia. The doctor will examine you and ask you questions about your health to help find the cause of your pain. Blood tests or imaging tests like an X-ray may also be helpful. But sometimes doctors can't find a cause of the pain. Treatment depends on your symptoms and the cause of the pain, if known. The doctor has checked you carefully, but problems can develop later. If you notice any problems or new symptoms, get medical treatment right away. Follow-up care is a key part of your treatment and safety. Be sure to make and go to all appointments, and call your doctor if you are having problems. It's also a good idea to know your test results and keep a list of the medicines you take. How can you care for yourself at home? · Rest until you feel better. · Do not do anything that makes the pain worse. Return to exercise gradually if you feel better and your doctor says it's okay. · Be safe with medicines. Read and follow all instructions on the label. ¨ If the doctor gave you a prescription medicine for pain, take it as prescribed.   ¨ If you are not taking a prescription pain medicine, ask your doctor if you can take an over-the-counter medicine. · Put ice or a cold pack on the area for 10 to 20 minutes at a time to ease pain. Put a thin cloth between the ice and your skin. When should you call for help? Call your doctor now or seek immediate medical care if:  · You have new pain, or your pain gets worse. · You have new symptoms such as a fever, a rash, or chills. Watch closely for changes in your health, and be sure to contact your doctor if:  · You do not get better as expected. Where can you learn more? Go to http://lyric-jamarcus.info/. Enter M535 in the search box to learn more about \"Musculoskeletal Pain: Care Instructions. \"  Current as of: October 14, 2016  Content Version: 11.3  © 9624-1898 [employer] Care instructions adapted under license by [facility] (which disclaims liability or warranty for this information). If you have questions about a medical condition or this instruction, always ask your healthcare professional. [place] [address] any warranty or liability for your use of this information.

## 2017-09-23 NOTE — ED PROVIDER NOTES
HPI Comments: 23year old female presenting to the ED for pain. Pt reports that she started about 4-5 days ago with neck and back pain. Pt reports right sided neck pain and right sided low back pain, mild to moderate, described as a soreness, no radiation into the extremities, worsened with movement. Denies HA, fever, numbness, weakness. Patient denies urinary retention, incontinence of bowel or bladder, perineal numbness, fever, gait disturbance, or history of IV drug abuse. No long-term steroid use. Pt has been taking OTC meds with transient relief. States that about 10 days ago she developed shallow ulcers to her genital area for which she saw her OB, had negative gc/chlamydia, in viewing pt's online MyChart there is a test for which a positive result was logged but no identifier was used. Pt states that a few weeks ago she had a cold. Denies CP, SOB, abdominal pain, N/V/D, or other concerns. PMHx: irregular MP, HA, vertigo, interstitial cystitis, asthma, reflux  Social: non-smoker. Patient is a 23 y.o. female presenting with neck pain and leg pain. The history is provided by the patient. Neck Pain    Associated symptoms include leg pain. Pertinent negatives include no chest pain, no numbness and no weakness. Leg Pain    Associated symptoms include back pain and neck pain. Pertinent negatives include no numbness. Past Medical History:   Diagnosis Date    Acid reflux     Asthma     Bladder incontinence     Endocrine disease     isulin resistance    Headache     Irregular menstrual cycle     Vertigo        History reviewed. No pertinent surgical history.       Family History:   Problem Relation Age of Onset   Hollie Egan Arthritis-osteo Mother     Asthma Mother     Headache Mother      migraines    Anxiety Mother     Hypertension Maternal Grandmother     Hypertension Maternal Grandfather     Cancer Maternal Grandfather      prostate    Hypertension Paternal Grandmother     Diabetes Paternal Grandmother     Thyroid Disease Paternal Grandmother     Hypertension Paternal Grandfather     Diabetes Paternal Grandfather     Thyroid Disease Paternal Grandfather        Social History     Social History    Marital status: SINGLE     Spouse name: N/A    Number of children: N/A    Years of education: N/A     Occupational History    Not on file. Social History Main Topics    Smoking status: Never Smoker    Smokeless tobacco: Never Used    Alcohol use No    Drug use: No    Sexual activity: No     Other Topics Concern    Not on file     Social History Narrative    ** Merged History Encounter **         ** Merged History Encounter **              ALLERGIES: Latex; Latex; Banana; Banana; Kiwi; Kiwi; Nut flavor; Peanut; Strawberry; and Tree nuts    Review of Systems   Constitutional: Negative for appetite change, chills and fever. HENT: Negative for sore throat and trouble swallowing. Eyes: Negative for discharge. Respiratory: Negative for shortness of breath. Cardiovascular: Negative for chest pain and leg swelling. Gastrointestinal: Negative for abdominal pain, diarrhea and vomiting. Genitourinary: Negative for dysuria. Musculoskeletal: Positive for back pain, myalgias and neck pain. Skin: Negative for rash. Neurological: Negative for weakness and numbness. All other systems reviewed and are negative. Vitals:    09/23/17 1236   BP: (!) 133/93   Pulse: 88   Resp: 17   Temp: 97.8 °F (36.6 °C)   SpO2: 97%   Weight: 86.2 kg (190 lb)   Height: 5' 6\" (1.676 m)            Physical Exam   Constitutional: She appears well-developed and well-nourished. No distress. Pleasant, smiling, well-appearing AA female   HENT:   Head: Normocephalic and atraumatic. Right Ear: External ear normal.   Left Ear: External ear normal.   Eyes: Conjunctivae are normal. Pupils are equal, round, and reactive to light. Right eye exhibits no discharge. Left eye exhibits no discharge.    Neck: Normal range of motion. Neck supple. No C-spine midline tenderness  + right paraspinal and right trapezius tenderness   Cardiovascular: Normal rate, regular rhythm and normal heart sounds. Exam reveals no gallop and no friction rub. No murmur heard. Pulmonary/Chest: Effort normal and breath sounds normal. No respiratory distress. Abdominal: Soft. She exhibits no distension. There is no tenderness. Musculoskeletal:   No CVA tenderness  + right lumbar muscular tenderness   Neurological: She is alert. She has normal strength. She is not disoriented. No sensory deficit. Reflex Scores:       Patellar reflexes are 2+ on the right side and 2+ on the left side. 5/5 strength at the ankles, knees, and hips  5/5  strength and bicep/tricep strength  2+ patellar, brachioradialis, and bicep reflexes  Sensation grossly intact distally  Observed patient ambulate with steady gait   Skin: Skin is warm and dry. Psychiatric: She has a normal mood and affect. Her behavior is normal.   Nursing note and vitals reviewed. MDM  Number of Diagnoses or Management Options  Muscular pain:   Diagnosis management comments: 23year old female presenting for neck back pain for several days. MSK in nature. No radicular symptoms, neurologic symptoms, fever, rash, or hx c/f more insidious cause such as meningitis, lyme, myositis, transverse myelitis, cauda equina, epidural abscess, etc.  Reproducible with movement and palpation. Pt also with recent sores to the genital area for which she has seen her OB, has somewhat ambiguous result report but suspect it is possible positive for herpes, which could explain myalgias. Encouraged use of ibuprofen, tylenol, will write for short course of muscle relaxer, PCP f/u PRN.        Amount and/or Complexity of Data Reviewed  Discuss the patient with other providers: yes (Dr. Sylvia Cordero, ED attending)      ED Course       Procedures

## 2018-02-03 ENCOUNTER — HOSPITAL ENCOUNTER (EMERGENCY)
Age: 23
Discharge: HOME | End: 2018-02-03
Attending: EMERGENCY MEDICINE
Payer: COMMERCIAL

## 2018-02-03 VITALS
TEMPERATURE: 98.3 F | DIASTOLIC BLOOD PRESSURE: 79 MMHG | OXYGEN SATURATION: 97 % | SYSTOLIC BLOOD PRESSURE: 127 MMHG | HEIGHT: 66 IN | HEART RATE: 96 BPM | RESPIRATION RATE: 16 BRPM | BODY MASS INDEX: 29.87 KG/M2 | WEIGHT: 185.85 LBS

## 2018-02-03 DIAGNOSIS — R22.9 SINGLE SKIN NODULE: Primary | ICD-10-CM

## 2018-02-03 PROCEDURE — 99282 EMERGENCY DEPT VISIT SF MDM: CPT

## 2018-02-03 RX ORDER — NORETHINDRONE ACETATE AND ETHINYL ESTRADIOL 1.5-30(21)
1 KIT ORAL DAILY
COMMUNITY
End: 2018-09-22

## 2018-02-03 RX ORDER — LUBIPROSTONE 8 UG/1
CAPSULE, GELATIN COATED ORAL
COMMUNITY
End: 2018-09-22

## 2018-02-03 NOTE — ED PROVIDER NOTES
HPI Comments: The patient is a 23 y.o. female with past medical history significant for IBS, interstitial cystitis, migraine headache, asthma and GERD who presents from home under the advice of her mother with chief complaint of a lesion on her scap. Patient states that she noticed the bump 2-3 days ago when parting her hair. She mentioned it to her mom this morning who advised her to be seen in the ED today. She denies pain, tenderness, itching, drainage, fevers, chills, headache, chest pain, history of kerions of her scalp. There are no other acute medical concerns at this time. PCP: Jania Carlisle MD      The history is provided by the patient. No  was used. Past Medical History:   Diagnosis Date    Acid reflux     Asthma     Bladder incontinence     Endocrine disease     isulin resistance    Headache(784.0)     Interstitial cystitis     Irregular menstrual cycle     Vertigo        History reviewed. No pertinent surgical history. Family History:   Problem Relation Age of Onset   [address] [facility] Arthritis-osteo Mother     Asthma Mother     Headache Mother      migraines    Anxiety Mother     Hypertension Maternal Grandmother     Hypertension Maternal Grandfather     Cancer Maternal Grandfather      prostate    Hypertension Paternal Grandmother     Diabetes Paternal Grandmother     Thyroid Disease Paternal Grandmother     Hypertension Paternal Grandfather     Diabetes Paternal Grandfather     Thyroid Disease Paternal Grandfather        Social History     Social History    Marital status: SINGLE     Spouse name: N/A    Number of children: N/A    Years of education: N/A     Occupational History    Not on file.      Social History Main Topics    Smoking status: Never Smoker    Smokeless tobacco: Never Used    Alcohol use No    Drug use: No    Sexual activity: No     Other Topics Concern    Not on file     Social History Narrative    ** Merged History Encounter ** ** Merged History Encounter **              ALLERGIES: Latex; Latex; Banana; Banana; Kiwi; Kiwi; Nut flavor; Peanut; Strawberry; and Tree nuts    Review of Systems   Constitutional: Negative for appetite change, chills and fever. HENT: Negative. Respiratory: Negative for cough, shortness of breath and wheezing. Cardiovascular: Negative for chest pain. Gastrointestinal: Negative for abdominal pain, constipation, diarrhea, nausea and vomiting. Genitourinary: Negative for dysuria and urgency. Musculoskeletal: Negative for back pain. Skin: Negative for color change and rash. Skin nodule of the scalp     Neurological: Negative for dizziness and headaches. Psychiatric/Behavioral: Negative. All other systems reviewed and are negative. Vitals:    02/03/18 1213   BP: 127/79   Pulse: 96   Resp: 16   Temp: 98.3 °F (36.8 °C)   SpO2: 97%   Weight: 84.3 kg (185 lb 13.6 oz)   Height: 5' 6\" (1.676 m)            Physical Exam   Constitutional: She is oriented to person, place, and time. She appears well-developed and well-nourished. HENT:   Head: Normocephalic and atraumatic. Eyes: EOM are normal.   Neck: Normal range of motion. Neck supple. No tracheal deviation present. Cardiovascular: Normal rate, regular rhythm, normal heart sounds and intact distal pulses. Pulmonary/Chest: Effort normal and breath sounds normal. No respiratory distress. She has no wheezes. She has no rales. She exhibits no tenderness. Abdominal: Soft. Bowel sounds are normal. There is no tenderness. There is no guarding. Musculoskeletal: Normal range of motion. Neurological: She is alert and oriented to person, place, and time. Skin: Skin is warm and dry. No erythema.        ~ 0.5cm x 0.5cm mobile, non-tender, non-flucuant, non-draining, non-erythematous round lesion of the left lateral scalp. Psychiatric: She has a normal mood and affect.  Her behavior is normal. Judgment and thought content normal. Nursing note and vitals reviewed. [facility]      ED Course   Assessment & Plan: Mobile non-tender lesion    Will need dermatology follow-up. Discussed with DO Don Dudley NP  02/03/18  12:30 PM      Procedures    12:37 PM  The patient has been reevaluated. The patient is ready for discharge. The patient's signs, symptoms, diagnosis, and discharge instructions have been discussed and the patient/ family has conveyed their understanding. The patient is to follow up as recommended or return to the ED should their symptoms worsen. Plan has been discussed and the patient is in agreement. LABORATORY TESTS:  No results found for this or any previous visit (from the past 12 hour(s)). IMAGING RESULTS:  No orders to display     No results found. MEDICATIONS GIVEN:  Medications - No data to display    IMPRESSION:  1. Single skin nodule        PLAN:  1. Current Discharge Medication List      CONTINUE these medications which have NOT CHANGED    Details   lubiPROStone (AMITIZA) 8 mcg capsule Take  by mouth. norethindrone-ethinyl estradiol-iron (BLISOVI FE 1.5/30, 28,) 1.5 mg-30 mcg (21)/75 mg (7) tab Take 1 Tab by mouth daily. albuterol (PROVENTIL HFA, VENTOLIN HFA, PROAIR HFA) 90 mcg/actuation inhaler Take 2 Puffs by inhalation every four (4) hours as needed for Wheezing. Qty: 1 Inhaler, Refills: 0           2. Follow-up Information     Follow up With Details Comments Contact Info    [facility] Schedule an appointment as soon as possible for a visit as soon as possibel for follow-up of nodule on the scalp [address] [facility] [address]  [phone]    [address] [facility] DEPT  As needed, If symptoms worsen [address]  [facility] [address]  [phone]        3.  Discussed return precautions    Return to ED for new or worsening symptoms       Don English NP

## 2018-02-03 NOTE — DISCHARGE INSTRUCTIONS
Skin Lesions: Care Instructions  Your Care Instructions  A skin lesion is a general term used for the different types of bumps, spots, moles or other growths that may appear on your skin. Most skin lesions are harmless, but sometimes they can be a sign of skin cancer or other health problems. Depending on what type of lesion you have, your doctor may cut out all or a small area of the skin tissue and send it to a lab to be looked at under a microscope. This is called a biopsy. A biopsy may be done to figure out what the lesion is or to make sure it is not skin cancer. Follow-up care is a key part of your treatment and safety. Be sure to make and go to all appointments, and call your doctor if you are having problems. It's also a good idea to know your test results and keep a list of the medicines you take. How can you care for yourself at home? · If your doctor told you how to care for your wound, follow your doctor's instructions. If you did not get instructions, follow this general advice:  ¨ Keep the wound bandaged and dry for the first day. ¨ After the first day, wash around the wound with clean water 2 times a day. Don't use hydrogen peroxide or alcohol, which can slow healing. ¨ You may cover the wound with a thin layer of petroleum jelly, such as Vaseline, and a nonstick bandage. ¨ Apply more petroleum jelly and replace the bandage as needed. · If you have stitches, you may get other instructions. You will have to return to have the stitches removed. · If a scab forms, do not pull it off. Let it fall off on its own. Wounds heal faster if no scab forms. Washing the area every day and using petroleum jelly will help keep a scab from forming. · If the wound bleeds, put direct pressure on it with a clean cloth until the bleeding stops. · Take an over-the-counter pain medicine, such as acetaminophen (Tylenol), ibuprofen (Advil, Motrin), or naproxen (Aleve).  Read and follow all instructions on the label.  · Do not take two or more pain medicines at the same time unless the doctor told you to. Many pain medicines have acetaminophen, which is Tylenol. Too much acetaminophen (Tylenol) can be harmful. · If you had a growth \"frozen\" off with liquid nitrogen, you may get a blister. Do not break it. Let it dry up on its own. It is common for the blister to fill with blood. You do not need to do anything about this, but if it becomes too painful, call your doctor. When should you call for help? Call your doctor now or seek immediate medical care if:  ? · You have signs of infection, such as:  ¨ Increased pain, swelling, warmth, or redness. ¨ Red streaks leading from the wound. ¨ Pus draining from the wound. ¨ A fever. ? Watch closely for changes in your health, and be sure to contact your doctor if:  ? · The wound changes, bleeds, or gets worse. ? · You do not get better after 2 weeks of home care. Where can you learn more? Go to http://lyric-jamarcus.info/. Enter N626 in the search box to learn more about \"Skin Lesions: Care Instructions. \"  Current as of: October 13, 2016  Content Version: 11.4  © 9998-6863 [employer]. Care instructions adapted under license by [employer] (which disclaims liability or warranty for this information). If you have questions about a medical condition or this instruction, always ask your healthcare professional. [place] [address] any warranty or liability for your use of this information.

## 2018-02-03 NOTE — ED TRIAGE NOTES
Patient ambulatory to ED treatment area with steady gait for complaint of \"I found a bump on my scalp on Thursday and my mom wanted me to come in to get it checked. \" Patient denies pain. Denies ever having anything like this before. Denies drainage from the area.

## 2018-07-11 ENCOUNTER — HOSPITAL ENCOUNTER (EMERGENCY)
Age: 23
Discharge: HOME | End: 2018-07-11
Attending: EMERGENCY MEDICINE | Admitting: EMERGENCY MEDICINE
Payer: COMMERCIAL

## 2018-07-11 ENCOUNTER — APPOINTMENT (OUTPATIENT)
Dept: CT IMAGING | Age: 23
End: 2018-07-11
Attending: EMERGENCY MEDICINE
Payer: COMMERCIAL

## 2018-07-11 VITALS
HEIGHT: 66 IN | TEMPERATURE: 98.1 F | WEIGHT: 170 LBS | OXYGEN SATURATION: 98 % | RESPIRATION RATE: 18 BRPM | SYSTOLIC BLOOD PRESSURE: 126 MMHG | BODY MASS INDEX: 27.32 KG/M2 | HEART RATE: 67 BPM | DIASTOLIC BLOOD PRESSURE: 76 MMHG

## 2018-07-11 DIAGNOSIS — R10.84 ABDOMINAL PAIN, GENERALIZED: Primary | ICD-10-CM

## 2018-07-11 LAB
ALBUMIN SERPL-MCNC: 3.7 G/DL (ref 3.5–5)
ALBUMIN/GLOB SERPL: 0.7 {RATIO} (ref 1.1–2.2)
ALP SERPL-CCNC: 73 U/L (ref 45–117)
ALT SERPL-CCNC: 38 U/L (ref 12–78)
ANION GAP SERPL CALC-SCNC: 8 MMOL/L (ref 5–15)
APPEARANCE UR: CLEAR
AST SERPL-CCNC: 15 U/L (ref 15–37)
BACTERIA URNS QL MICRO: NEGATIVE /HPF
BASOPHILS # BLD: 0 K/UL (ref 0–0.1)
BASOPHILS NFR BLD: 0 % (ref 0–1)
BILIRUB SERPL-MCNC: 0.2 MG/DL (ref 0.2–1)
BILIRUB UR QL: NEGATIVE
BUN SERPL-MCNC: 15 MG/DL (ref 6–20)
BUN/CREAT SERPL: 18 (ref 12–20)
CALCIUM SERPL-MCNC: 8.9 MG/DL (ref 8.5–10.1)
CHLORIDE SERPL-SCNC: 104 MMOL/L (ref 97–108)
CO2 SERPL-SCNC: 26 MMOL/L (ref 21–32)
COLOR UR: ABNORMAL
CREAT SERPL-MCNC: 0.82 MG/DL (ref 0.55–1.02)
DIFFERENTIAL METHOD BLD: NORMAL
EOSINOPHIL # BLD: 0.1 K/UL (ref 0–0.4)
EOSINOPHIL NFR BLD: 1 % (ref 0–7)
EPITH CASTS URNS QL MICRO: ABNORMAL /LPF
ERYTHROCYTE [DISTWIDTH] IN BLOOD BY AUTOMATED COUNT: 12.6 % (ref 11.5–14.5)
GLOBULIN SER CALC-MCNC: 5 G/DL (ref 2–4)
GLUCOSE SERPL-MCNC: 86 MG/DL (ref 65–100)
GLUCOSE UR STRIP.AUTO-MCNC: NEGATIVE MG/DL
HCG UR QL: NEGATIVE
HCT VFR BLD AUTO: 44 % (ref 35–47)
HGB BLD-MCNC: 13.9 G/DL (ref 11.5–16)
HGB UR QL STRIP: ABNORMAL
HYALINE CASTS URNS QL MICRO: ABNORMAL /LPF (ref 0–5)
IMM GRANULOCYTES # BLD: 0 K/UL (ref 0–0.04)
IMM GRANULOCYTES NFR BLD AUTO: 0 % (ref 0–0.5)
KETONES UR QL STRIP.AUTO: NEGATIVE MG/DL
LEUKOCYTE ESTERASE UR QL STRIP.AUTO: NEGATIVE
LIPASE SERPL-CCNC: 125 U/L (ref 73–393)
LYMPHOCYTES # BLD: 2.6 K/UL (ref 0.8–3.5)
LYMPHOCYTES NFR BLD: 29 % (ref 12–49)
MCH RBC QN AUTO: 28 PG (ref 26–34)
MCHC RBC AUTO-ENTMCNC: 31.6 G/DL (ref 30–36.5)
MCV RBC AUTO: 88.5 FL (ref 80–99)
MONOCYTES # BLD: 1 K/UL (ref 0–1)
MONOCYTES NFR BLD: 11 % (ref 5–13)
NEUTS SEG # BLD: 5.3 K/UL (ref 1.8–8)
NEUTS SEG NFR BLD: 59 % (ref 32–75)
NITRITE UR QL STRIP.AUTO: NEGATIVE
NRBC # BLD: 0 K/UL (ref 0–0.01)
NRBC BLD-RTO: 0 PER 100 WBC
PH UR STRIP: 7 [PH] (ref 5–8)
PLATELET # BLD AUTO: 284 K/UL (ref 150–400)
PMV BLD AUTO: 11.2 FL (ref 8.9–12.9)
POTASSIUM SERPL-SCNC: 3.8 MMOL/L (ref 3.5–5.1)
PROT SERPL-MCNC: 8.7 G/DL (ref 6.4–8.2)
PROT UR STRIP-MCNC: NEGATIVE MG/DL
RBC # BLD AUTO: 4.97 M/UL (ref 3.8–5.2)
RBC #/AREA URNS HPF: ABNORMAL /HPF (ref 0–5)
SODIUM SERPL-SCNC: 138 MMOL/L (ref 136–145)
SP GR UR REFRACTOMETRY: 1.01 (ref 1–1.03)
UR CULT HOLD, URHOLD: NORMAL
UROBILINOGEN UR QL STRIP.AUTO: 0.2 EU/DL (ref 0.2–1)
WBC # BLD AUTO: 9 K/UL (ref 3.6–11)
WBC URNS QL MICRO: ABNORMAL /HPF (ref 0–4)

## 2018-07-11 PROCEDURE — 99283 EMERGENCY DEPT VISIT LOW MDM: CPT

## 2018-07-11 PROCEDURE — 74011250636 HC RX REV CODE- 250/636: Performed by: EMERGENCY MEDICINE

## 2018-07-11 PROCEDURE — 80053 COMPREHEN METABOLIC PANEL: CPT | Performed by: EMERGENCY MEDICINE

## 2018-07-11 PROCEDURE — 96375 TX/PRO/DX INJ NEW DRUG ADDON: CPT

## 2018-07-11 PROCEDURE — 74011000250 HC RX REV CODE- 250: Performed by: EMERGENCY MEDICINE

## 2018-07-11 PROCEDURE — 96361 HYDRATE IV INFUSION ADD-ON: CPT

## 2018-07-11 PROCEDURE — 74176 CT ABD & PELVIS W/O CONTRAST: CPT

## 2018-07-11 PROCEDURE — 81001 URINALYSIS AUTO W/SCOPE: CPT | Performed by: EMERGENCY MEDICINE

## 2018-07-11 PROCEDURE — 96374 THER/PROPH/DIAG INJ IV PUSH: CPT

## 2018-07-11 PROCEDURE — 36415 COLL VENOUS BLD VENIPUNCTURE: CPT | Performed by: EMERGENCY MEDICINE

## 2018-07-11 PROCEDURE — 83690 ASSAY OF LIPASE: CPT | Performed by: EMERGENCY MEDICINE

## 2018-07-11 PROCEDURE — 85025 COMPLETE CBC W/AUTO DIFF WBC: CPT | Performed by: EMERGENCY MEDICINE

## 2018-07-11 PROCEDURE — 81025 URINE PREGNANCY TEST: CPT

## 2018-07-11 RX ORDER — FENTANYL CITRATE 50 UG/ML
50 INJECTION, SOLUTION INTRAMUSCULAR; INTRAVENOUS ONCE
Status: COMPLETED | OUTPATIENT
Start: 2018-07-11 | End: 2018-07-11

## 2018-07-11 RX ORDER — DICYCLOMINE HYDROCHLORIDE 20 MG/1
20 TABLET ORAL
Qty: 20 TAB | Refills: 0 | Status: SHIPPED | OUTPATIENT
Start: 2018-07-11 | End: 2022-06-08 | Stop reason: SINTOL

## 2018-07-11 RX ORDER — ONDANSETRON 2 MG/ML
8 INJECTION INTRAMUSCULAR; INTRAVENOUS
Status: COMPLETED | OUTPATIENT
Start: 2018-07-11 | End: 2018-07-11

## 2018-07-11 RX ORDER — ONDANSETRON 4 MG/1
4 TABLET, ORALLY DISINTEGRATING ORAL
Qty: 20 TAB | Refills: 0 | OUTPATIENT
Start: 2018-07-11 | End: 2022-08-27

## 2018-07-11 RX ORDER — FAMOTIDINE 10 MG/ML
20 INJECTION INTRAVENOUS
Status: DISCONTINUED | OUTPATIENT
Start: 2018-07-11 | End: 2018-07-11 | Stop reason: CLARIF

## 2018-07-11 RX ADMIN — SODIUM CHLORIDE 1000 ML: 900 INJECTION, SOLUTION INTRAVENOUS at 21:20

## 2018-07-11 RX ADMIN — FAMOTIDINE 20 MG: 10 INJECTION, SOLUTION INTRAVENOUS at 21:22

## 2018-07-11 RX ADMIN — FENTANYL CITRATE 50 MCG: 50 INJECTION, SOLUTION INTRAMUSCULAR; INTRAVENOUS at 21:24

## 2018-07-11 RX ADMIN — ONDANSETRON 8 MG: 2 INJECTION INTRAMUSCULAR; INTRAVENOUS at 21:20

## 2018-07-12 NOTE — DISCHARGE INSTRUCTIONS

## 2018-07-12 NOTE — ED PROVIDER NOTES
HPI Comments: 26 y.o. female with past medical history significant for IBS, vertigo, bladder incontinence, interstitial cystitis, asthma and GERD who presents via private vehicle with mother with chief complaint of abd pain. Pt reports sudden onset of periumbilical pain at 4237, ~2 hours ago that has persisted and is currently accompanied by nausea. Pt explains at onset of pain she was diaphoretic and lightheaded, prompting her to visit the ED. Pt notes she drank a smoothie from [employer] 15 minutes before onset of her pain. Pt also notes diarrhea yesterday. Pt explains she has IBS but has \"not felt this bad\" since she was seen in the ED last year and dx with salmonella, for which she took abx. Pt notes at that time she was having diarrhea and vomiting. Pt explains one year ago she had an unremarkable endoscopy and colonoscopy. Pt denies vomiting. Pt denies previous hx of abd surgery, stomach ulcer and pancreatitis. There are no other acute medical concerns at this time. Social hx: denies tobacco use  PCP: Simran Lane MD    Note written by Shirley Hayward, as dictated by Hao Quintanilla MD 8:35 PM      The history is provided by the patient. Past Medical History:   Diagnosis Date    Acid reflux     Asthma     Bladder incontinence     Endocrine disease     isulin resistance    Headache(784.0)     IBS (irritable bowel syndrome)     Interstitial cystitis     Irregular menstrual cycle     Vertigo        History reviewed. No pertinent surgical history.       Family History:   Problem Relation Age of Onset   Cleve Arnie Arthritis-osteo Mother     Asthma Mother     Headache Mother      migraines    Anxiety Mother     Hypertension Maternal Grandmother     Hypertension Maternal Grandfather     Cancer Maternal Grandfather      prostate    Hypertension Paternal Grandmother     Diabetes Paternal Grandmother     Thyroid Disease Paternal Grandmother     Hypertension Paternal Rosa Soriano Diabetes Paternal Grandfather     Thyroid Disease Paternal Grandfather        Social History     Social History    Marital status: SINGLE     Spouse name: N/A    Number of children: N/A    Years of education: N/A     Occupational History    Not on file. Social History Main Topics    Smoking status: Never Smoker    Smokeless tobacco: Never Used    Alcohol use No    Drug use: No    Sexual activity: No     Other Topics Concern    Not on file     Social History Narrative    ** Merged History Encounter **         ** Merged History Encounter **              ALLERGIES: Latex; Latex; Banana; Banana; Kiwi; Kiwi; Nut flavor; Peanut; Strawberry; and Tree nuts    Review of Systems   Constitutional: Positive for diaphoresis. Negative for fever. Eyes: Negative for visual disturbance. Respiratory: Negative for cough, shortness of breath and wheezing. Cardiovascular: Negative for chest pain and leg swelling. Gastrointestinal: Positive for abdominal pain, diarrhea and nausea. Negative for vomiting. Genitourinary: Negative for dysuria. Musculoskeletal: Negative. Negative for back pain and neck stiffness. Skin: Negative for rash. Neurological: Positive for light-headedness. Negative for syncope and headaches. Psychiatric/Behavioral: Negative for confusion. All other systems reviewed and are negative. Vitals:    07/11/18 2014   BP: 124/87   Pulse: 82   Resp: 20   Temp: 98 °F (36.7 °C)   SpO2: 98%   Weight: 77.1 kg (170 lb)   Height: 5' 6\" (1.676 m)            Physical Exam   Constitutional: She appears well-developed and well-nourished. Moderate to severe pain   HENT:   Head: Normocephalic. Eyes: Pupils are equal, round, and reactive to light. Neck: Normal range of motion. Cardiovascular: Normal rate and regular rhythm. No murmur heard. Pulmonary/Chest: Effort normal and breath sounds normal. No respiratory distress. Abdominal: Soft. There is tenderness (mild diffuse).    Musculoskeletal: Normal range of motion. She exhibits no edema. Neurological: She is alert. She has normal strength. No cranial nerve deficit. Skin: Skin is warm and dry. Psychiatric: She has a normal mood and affect. Her behavior is normal.   Nursing note and vitals reviewed. Note written by Shirley Pelaez, as dictated by Juani Wetzel MD 8:35 PM    [facility]      ED Course       Procedures    PROGRESS NOTE:  10:15 PM  Pt's pain is much improved. We discussed CT findings and she will be discharged on medications.

## 2018-07-12 NOTE — ED TRIAGE NOTES
Arrived from [address] [place] [address] c/o mid abdominal pain 30 minutes. -NVD, -headache, +dizziness. Patient reports having smoothie from [employer] at 7pm.. BM yesterday was liquid and prior to that has not had BM in a week. Patient breathe smells of bile. Patient reports having regular periods, even though she takes BCP.

## 2018-07-12 NOTE — ED NOTES
Discharge instructions and prescriptions given to pt by doctor. Pt has some pain in stomach but not as bad per her report. It is a 2 on a 1-10 scale. Pt taken to car in wheelchair.

## 2018-09-22 ENCOUNTER — HOSPITAL ENCOUNTER (EMERGENCY)
Age: 23
Discharge: HOME | End: 2018-09-22
Attending: EMERGENCY MEDICINE
Payer: COMMERCIAL

## 2018-09-22 ENCOUNTER — APPOINTMENT (OUTPATIENT)
Dept: ULTRASOUND IMAGING | Age: 23
End: 2018-09-22
Attending: EMERGENCY MEDICINE
Payer: COMMERCIAL

## 2018-09-22 VITALS
BODY MASS INDEX: 28.77 KG/M2 | TEMPERATURE: 98.1 F | RESPIRATION RATE: 16 BRPM | HEIGHT: 66 IN | SYSTOLIC BLOOD PRESSURE: 126 MMHG | OXYGEN SATURATION: 98 % | HEART RATE: 71 BPM | DIASTOLIC BLOOD PRESSURE: 85 MMHG | WEIGHT: 179 LBS

## 2018-09-22 DIAGNOSIS — R10.11 ABDOMINAL PAIN, RIGHT UPPER QUADRANT: Primary | ICD-10-CM

## 2018-09-22 LAB
ALBUMIN SERPL-MCNC: 3.4 G/DL (ref 3.5–5)
ALBUMIN/GLOB SERPL: 0.8 {RATIO} (ref 1.1–2.2)
ALP SERPL-CCNC: 65 U/L (ref 45–117)
ALT SERPL-CCNC: 42 U/L (ref 12–78)
ANION GAP SERPL CALC-SCNC: 9 MMOL/L (ref 5–15)
APPEARANCE UR: CLEAR
AST SERPL-CCNC: 16 U/L (ref 15–37)
BACTERIA URNS QL MICRO: NEGATIVE /HPF
BASOPHILS # BLD: 0 K/UL (ref 0–0.1)
BASOPHILS NFR BLD: 0 % (ref 0–1)
BILIRUB SERPL-MCNC: 0.4 MG/DL (ref 0.2–1)
BILIRUB UR QL: NEGATIVE
BUN SERPL-MCNC: 10 MG/DL (ref 6–20)
BUN/CREAT SERPL: 12 (ref 12–20)
CALCIUM SERPL-MCNC: 8.5 MG/DL (ref 8.5–10.1)
CHLORIDE SERPL-SCNC: 103 MMOL/L (ref 97–108)
CO2 SERPL-SCNC: 26 MMOL/L (ref 21–32)
COLOR UR: ABNORMAL
COMMENT, HOLDF: NORMAL
CREAT SERPL-MCNC: 0.83 MG/DL (ref 0.55–1.02)
DIFFERENTIAL METHOD BLD: NORMAL
EOSINOPHIL # BLD: 0.1 K/UL (ref 0–0.4)
EOSINOPHIL NFR BLD: 1 % (ref 0–7)
EPITH CASTS URNS QL MICRO: ABNORMAL /LPF
ERYTHROCYTE [DISTWIDTH] IN BLOOD BY AUTOMATED COUNT: 13 % (ref 11.5–14.5)
GLOBULIN SER CALC-MCNC: 4.5 G/DL (ref 2–4)
GLUCOSE SERPL-MCNC: 91 MG/DL (ref 65–100)
GLUCOSE UR STRIP.AUTO-MCNC: NEGATIVE MG/DL
HCG UR QL: NEGATIVE
HCT VFR BLD AUTO: 40.2 % (ref 35–47)
HGB BLD-MCNC: 12.9 G/DL (ref 11.5–16)
HGB UR QL STRIP: ABNORMAL
HYALINE CASTS URNS QL MICRO: ABNORMAL /LPF (ref 0–5)
IMM GRANULOCYTES # BLD: 0 K/UL (ref 0–0.04)
IMM GRANULOCYTES NFR BLD AUTO: 0 % (ref 0–0.5)
KETONES UR QL STRIP.AUTO: NEGATIVE MG/DL
LEUKOCYTE ESTERASE UR QL STRIP.AUTO: NEGATIVE
LIPASE SERPL-CCNC: 87 U/L (ref 73–393)
LYMPHOCYTES # BLD: 2 K/UL (ref 0.8–3.5)
LYMPHOCYTES NFR BLD: 25 % (ref 12–49)
MCH RBC QN AUTO: 27.9 PG (ref 26–34)
MCHC RBC AUTO-ENTMCNC: 32.1 G/DL (ref 30–36.5)
MCV RBC AUTO: 87 FL (ref 80–99)
MONOCYTES # BLD: 0.7 K/UL (ref 0–1)
MONOCYTES NFR BLD: 9 % (ref 5–13)
MUCOUS THREADS URNS QL MICRO: ABNORMAL /LPF
NEUTS SEG # BLD: 5 K/UL (ref 1.8–8)
NEUTS SEG NFR BLD: 64 % (ref 32–75)
NITRITE UR QL STRIP.AUTO: NEGATIVE
NRBC # BLD: 0 K/UL (ref 0–0.01)
NRBC BLD-RTO: 0 PER 100 WBC
PH UR STRIP: 6 [PH] (ref 5–8)
PLATELET # BLD AUTO: 260 K/UL (ref 150–400)
PMV BLD AUTO: 11.2 FL (ref 8.9–12.9)
POTASSIUM SERPL-SCNC: 4 MMOL/L (ref 3.5–5.1)
PROT SERPL-MCNC: 7.9 G/DL (ref 6.4–8.2)
PROT UR STRIP-MCNC: ABNORMAL MG/DL
RBC # BLD AUTO: 4.62 M/UL (ref 3.8–5.2)
RBC #/AREA URNS HPF: ABNORMAL /HPF (ref 0–5)
SAMPLES BEING HELD,HOLD: NORMAL
SODIUM SERPL-SCNC: 138 MMOL/L (ref 136–145)
SP GR UR REFRACTOMETRY: 1.02 (ref 1–1.03)
UR CULT HOLD, URHOLD: NORMAL
UROBILINOGEN UR QL STRIP.AUTO: 0.2 EU/DL (ref 0.2–1)
WBC # BLD AUTO: 7.9 K/UL (ref 3.6–11)
WBC URNS QL MICRO: ABNORMAL /HPF (ref 0–4)

## 2018-09-22 PROCEDURE — 80053 COMPREHEN METABOLIC PANEL: CPT | Performed by: EMERGENCY MEDICINE

## 2018-09-22 PROCEDURE — 85025 COMPLETE CBC W/AUTO DIFF WBC: CPT | Performed by: EMERGENCY MEDICINE

## 2018-09-22 PROCEDURE — 81001 URINALYSIS AUTO W/SCOPE: CPT | Performed by: EMERGENCY MEDICINE

## 2018-09-22 PROCEDURE — 36415 COLL VENOUS BLD VENIPUNCTURE: CPT | Performed by: EMERGENCY MEDICINE

## 2018-09-22 PROCEDURE — 76705 ECHO EXAM OF ABDOMEN: CPT

## 2018-09-22 PROCEDURE — 81025 URINE PREGNANCY TEST: CPT

## 2018-09-22 PROCEDURE — 74011250637 HC RX REV CODE- 250/637: Performed by: EMERGENCY MEDICINE

## 2018-09-22 PROCEDURE — 99284 EMERGENCY DEPT VISIT MOD MDM: CPT

## 2018-09-22 PROCEDURE — 83690 ASSAY OF LIPASE: CPT | Performed by: EMERGENCY MEDICINE

## 2018-09-22 RX ORDER — DICYCLOMINE HYDROCHLORIDE 20 MG/1
20 TABLET ORAL
Qty: 20 TAB | Refills: 0 | Status: SHIPPED | OUTPATIENT
Start: 2018-09-22 | End: 2018-10-09 | Stop reason: SDUPTHER

## 2018-09-22 RX ORDER — MAG HYDROX/ALUMINUM HYD/SIMETH 200-200-20
30 SUSPENSION, ORAL (FINAL DOSE FORM) ORAL
Status: COMPLETED | OUTPATIENT
Start: 2018-09-22 | End: 2018-09-22

## 2018-09-22 RX ORDER — NORETHINDRONE ACETATE AND ETHINYL ESTRADIOL 1MG-20(21)
1 KIT ORAL DAILY
COMMUNITY
End: 2018-10-09

## 2018-09-22 RX ORDER — DICYCLOMINE HYDROCHLORIDE 10 MG/1
20 CAPSULE ORAL
Status: COMPLETED | OUTPATIENT
Start: 2018-09-22 | End: 2018-09-22

## 2018-09-22 RX ADMIN — DICYCLOMINE HYDROCHLORIDE 20 MG: 10 CAPSULE ORAL at 12:56

## 2018-09-22 RX ADMIN — ALUMINUM HYDROXIDE, MAGNESIUM HYDROXIDE, AND SIMETHICONE 30 ML: 200; 200; 20 SUSPENSION ORAL at 12:05

## 2018-09-22 NOTE — DISCHARGE INSTRUCTIONS
Irritable Bowel Syndrome: Care Instructions  Your Care Instructions  Irritable bowel syndrome, or IBS, is a problem with the intestines that causes belly pain, bloating, gas, constipation, and diarrhea. The cause of IBS is not well known. IBS can last for many years, but it does not get worse over time or lead to serious disease. Most people can control their symptoms by changing their diet and reducing stress. Follow-up care is a key part of your treatment and safety. Be sure to make and go to all appointments, and call your doctor if you are having problems. It's also a good idea to know your test results and keep a list of the medicines you take. How can you care for yourself at home? · For constipation:  ¨ Include fruits, vegetables, beans, and whole grains in your diet each day. These foods are high in fiber. ¨ Drink plenty of fluids, enough so that your urine is light yellow or clear like water. If you have kidney, heart, or liver disease and have to limit fluids, talk with your doctor before you increase the amount of fluids you drink. ¨ Get some exercise every day. Build up slowly to 30 to 60 minutes a day on 5 or more days of the week. ¨ Take a fiber supplement, such as Citrucel or Metamucil, every day if needed. Read and follow all instructions on the label. ¨ Schedule time each day for a bowel movement. Having a daily routine may help. Take your time and do not strain when having a bowel movement. · If you often have diarrhea, limit foods and drinks that make it worse. These are different for each person but may include caffeine (found in coffee, tea, chocolate, and cola drinks), alcohol, fatty foods, gas-producing foods (such as beans, cabbage, and broccoli), some dairy products, and spicy foods. Do not eat candy or gum that contains sorbitol. · Keep a daily diary of what you eat and what symptoms you have. This may help find foods that cause you problems. · Eat slowly.  Try to make mealtime relaxing. · Find ways to reduce stress. · Get at least 30 minutes of exercise on most days of the week. Exercise can help reduce tension and prevent constipation. Walking is a good choice. You also may want to do other activities, such as running, swimming, cycling, or playing tennis or team sports. When should you call for help? Call your doctor now or seek immediate medical care if:    · Your pain is different than usual or occurs with fever.     · You lose weight without trying, or you lose your appetite and you do not know why.     · Your symptoms often wake you from sleep.     · Your stools are black and tarlike or have streaks of blood.    Watch closely for changes in your health, and be sure to contact your doctor if:    · Your IBS symptoms get worse or begin to disrupt your day-to-day life.     · You become more tired than usual.     · Your home treatment stops working. Where can you learn more? Go to http://lyricQuantaLifejamarcus.info/. Enter U472 in the search box to learn more about \"Irritable Bowel Syndrome: Care Instructions. \"  Current as of: May 12, 2017  Content Version: 11.7  © 9351-8845 [employer]. Care instructions adapted under license by [employer] (which disclaims liability or warranty for this information). If you have questions about a medical condition or this instruction, always ask your healthcare professional. [address] any warranty or liability for your use of this information. Abdominal Pain: After Your Visit to the Emergency Room  Your Care Instructions  Many abdominal problems can cause belly pain. Some are not serious and get better on their own in a few days. Other abdominal problems need more testing and emergency treatment. Your doctor may have recommended a follow-up visit in the next 8 to 12 hours. If you are not getting better, you may need more tests or treatment.   Even though you have been released from the emergency room, you still need to watch for any problems. The doctor carefully checked you. But sometimes problems can develop later. If you have new symptoms, or if your symptoms do not get better, return to the emergency room or call your doctor right away. A visit to the emergency room is only one step in your treatment. Even if you feel better, you still need to do what your doctor recommends, such as going to all suggested follow-up appointments and taking medicines exactly as directed. This will help you recover and help prevent future problems. How can you care for yourself at home? · Rest until you feel better. · Drink plenty of fluids to prevent dehydration. Choose water and other caffeine-free clear liquids until you feel better. If you have kidney, heart, or liver disease and have to limit fluids, talk with your doctor before you increase the amount of fluids you drink. · Take your medicines exactly as directed. Call your doctor if you think you are having a problem with your medicine. · Do not drive after taking a prescription pain medicine. When should you call for help? Call 911 if:  · You passed out (lost consciousness). · You have sudden chest pain and shortness of breath, or you cough up blood. · You pass maroon or very bloody stools. · You vomit blood or what looks like coffee grounds. · You have new, severe belly pain. · You have other symptoms that you think are a medical emergency. Return to the emergency room now if:  · You feel weak and lightheaded. · Your pain becomes focused in one area of your belly. · Your belly pain is worse after you cough or move. · You have a new or higher fever. Call your doctor today if:  · Your stools are black and tarlike or have streaks of blood. · You have new, unusual bleeding or discharge from the vagina.   · You have blood in your urine, it hurts when you urinate, or you have to urinate more often than usual.  · Your belly pain has not improved after 1 day. Where can you learn more? Go to TechPepper.be  Enter D163 in the search box to learn more about \"Abdominal Pain: After Your Visit to the Emergency Room. \"   © 4695-9990 [employer]. Care instructions adapted under license by Sonja Ponce (which disclaims liability or warranty for this information). This care instruction is for use with your licensed healthcare professional. If you have questions about a medical condition or this instruction, always ask your healthcare professional. [address] any warranty or liability for your use of this information.   Content Version: 9.3.87145; Last Revised: January 14, 2011

## 2018-09-22 NOTE — ED TRIAGE NOTES
Pt c/o upper mid abd pain since last week,with dark almost black diarrhea stools. Pt has hx of IBS and usually suffers from constipation. Pt wa. s unable to get into see a GI dr. Also having nausea no vomiting.  Hx of GERD

## 2018-09-22 NOTE — ED PROVIDER NOTES
HPI Comments: This patient presents with severe sharp abdominal pain in the epigastrium/just right of center in the right upper quadrant. This has been going on for about 7 days every day. Any type of food makes it worse. Water does not affect it. She tried no medications for the symptoms. She had some black stool on the second and third day of symptoms but since then it has been brown. She also has had some watery diarrhea. No fever or chills. Some nausea but no vomiting. Her last meal was last night. She had some water earlier today. Laying back makes the pain worse also. She has a history of IBS for the past 2 years. This pain is different than that pain. She also usually has constipation with her IBS. She has seen Dr. Jessica Dubois in the past as well as Dr. Florian Hurtado. She is a nonsmoker. Negative abdominal surgical history. Last normal menstrual period was 3 weeks ago. No urinary issues. Old chart reviewed  Last CT A/P from July this year:    FINDINGS:     The visualized lung bases are clear.     Abdomen:      Liver: The liver is normal on noncontrast images.      Spleen: The spleen is normal on noncontrast images.      Adrenals: The adrenals are normal on noncontrast images.      Pancreas: The pancreas is normal on noncontrast images.      Gallbladder: The gallbladder is normal on noncontrast images.      Kidneys: There is no perinephric stranding, hydronephrosis or hydroureter. No  renal, ureteral bladder calculus is visualized.      Bowel: No thickened or dilated loop of large or small bowel seen.      Appendix: The appendix is normal.     Pelvis: Urinary bladder is partially filled and grossly normal.     Miscellaneous: There is no free intraperitoneal gas or fluid. There is no focal  fluid collection to suggest abscess.     IMPRESSION  IMPRESSION: No renal calculi or evidence of obstructive uropathy.        Patient is a 26 y.o. female presenting with abdominal pain.    Abdominal Pain           Past Medical History: Diagnosis Date    Acid reflux     Asthma     Bladder incontinence     Endocrine disease     isulin resistance    Headache(784.0)     IBS (irritable bowel syndrome)     Interstitial cystitis     Irregular menstrual cycle     Vertigo        History reviewed. No pertinent surgical history. Family History:   Problem Relation Age of Onset   [address] [facility] Arthritis-osteo Mother     Asthma Mother     Headache Mother      migraines    Anxiety Mother     Hypertension Maternal Grandmother     Hypertension Maternal Grandfather     Cancer Maternal Grandfather      prostate    Hypertension Paternal Grandmother     Diabetes Paternal Grandmother     Thyroid Disease Paternal Grandmother     Hypertension Paternal Grandfather     Diabetes Paternal Grandfather     Thyroid Disease Paternal Grandfather        Social History     Social History    Marital status: SINGLE     Spouse name: N/A    Number of children: N/A    Years of education: N/A     Occupational History    Not on file. Social History Main Topics    Smoking status: Never Smoker    Smokeless tobacco: Never Used    Alcohol use No    Drug use: No    Sexual activity: No     Other Topics Concern    Not on file     Social History Narrative    ** Merged History Encounter **         ** Merged History Encounter **              ALLERGIES: Latex; Latex; Banana; Banana; Kiwi; Kiwi; Nut flavor; Peanut; Strawberry; and Tree nuts    Review of Systems   Gastrointestinal: Positive for abdominal pain. All other systems reviewed and are negative. Vitals:    09/22/18 1115   BP: 126/85   Pulse: 71   Resp: 16   Temp: 98.1 °F (36.7 °C)   SpO2: 98%   Weight: 81.2 kg (179 lb)   Height: 5' 6\" (1.676 m)            Physical Exam   Constitutional: She appears well-developed and well-nourished. No distress. HENT:   Head: Normocephalic.    Nose: Nose normal.   Mouth/Throat: Oropharynx is clear and moist.   Eyes: Conjunctivae are normal. Pupils are equal, round, and reactive to light. Neck: No tracheal deviation present. Cardiovascular: Normal rate, regular rhythm, normal heart sounds and intact distal pulses. Pulmonary/Chest: Effort normal and breath sounds normal.   Abdominal: Soft. There is tenderness (mild epigastric, RUQ.). There is no rebound and no guarding. Musculoskeletal: She exhibits no edema. Neurological: She is alert. Skin: Skin is warm and dry. She is not diaphoretic. Psychiatric: She has a normal mood and affect. [facility]      ED Course       Procedures               12:53 PM - pain worse after mylanta. Awaiting US tech arrival.  Will try bentyl. 2:29 PM - pain nearly gone. Reviewed US images  Better after bentyl  Will represcribe this. F/u with GI - seeabelardo Seo or Dr Johan Martinez. No CT needed    Labs Reviewed   URINALYSIS W/MICROSCOPIC - Abnormal; Notable for the following:        Result Value    Protein TRACE (*)     Blood TRACE (*)     Mucus 1+ (*)     All other components within normal limits   METABOLIC PANEL, COMPREHENSIVE - Abnormal; Notable for the following:      Albumin 3.4 (*)     Globulin 4.5 (*)     A-G Ratio 0.8 (*)     All other components within normal limits   URINE CULTURE HOLD SAMPLE   CBC WITH AUTOMATED DIFF   LIPASE   SAMPLES BEING HELD   HCG URINE, QL. - POC   SAMPLE TO BLOOD BANK

## 2018-09-22 NOTE — ED NOTES
The patient was discharged home by Dr Zainab Davison in stable condition. The patient is alert and oriented, in no respiratory distress and discharge vital signs obtained. The patient's diagnosis, condition and treatment were explained. The patient expressed understanding. One prescription sent to pharmacy. A discharge plan has been developed. Aftercare instructions were given. Pt ambulatory out of the ED.

## 2018-09-24 ENCOUNTER — PATIENT OUTREACH (OUTPATIENT)
Dept: OTHER | Age: 23
End: 2018-09-24

## 2018-09-24 NOTE — PROGRESS NOTES
Initial HPRP:   Patient on report as discharged from [address] [place] [address] ED Visit 9/22/18 for Abdominal Pain RUQ. Initial attempt to contact patient for transitions of care.  Left discreet message on voicemail with this Care Coordinator's contact information.  Will attempt outreach on 9/25/18.      dicyclomine 20 mg tablet - Bentyl    Call your doctor now or seek immediate medical care if:  ·Your pain is different than usual or occurs with fever. ·You lose weight without trying, or you lose your appetite and you do not know  why. ·Your symptoms often wake you from sleep. ·Your stools are black and tarlike or have streaks of blood. Watch closely for changes in your health, and be sure to contact your doctor if:  ·Your IBS symptoms get worse or begin to disrupt your day-to-day life. ·You become more tired than usual.  ·Your home treatment stops working.

## 2018-09-25 ENCOUNTER — PATIENT OUTREACH (OUTPATIENT)
Dept: OTHER | Age: 23
End: 2018-09-25

## 2018-10-05 ENCOUNTER — HOSPITAL ENCOUNTER (OUTPATIENT)
Dept: GENERAL RADIOLOGY | Age: 23
Discharge: HOME | End: 2018-10-05
Payer: COMMERCIAL

## 2018-10-05 DIAGNOSIS — K59.00 CONSTIPATION: ICD-10-CM

## 2018-10-05 PROCEDURE — 74018 RADEX ABDOMEN 1 VIEW: CPT

## 2018-10-09 ENCOUNTER — OFFICE VISIT (OUTPATIENT)
Dept: FAMILY MEDICINE CLINIC | Age: 23
End: 2018-10-09

## 2018-10-09 VITALS
OXYGEN SATURATION: 99 % | HEIGHT: 66 IN | HEART RATE: 82 BPM | DIASTOLIC BLOOD PRESSURE: 56 MMHG | SYSTOLIC BLOOD PRESSURE: 112 MMHG | BODY MASS INDEX: 28.64 KG/M2 | TEMPERATURE: 98.4 F | WEIGHT: 178.2 LBS | RESPIRATION RATE: 20 BRPM

## 2018-10-09 DIAGNOSIS — Z76.89 ESTABLISHING CARE WITH NEW DOCTOR, ENCOUNTER FOR: Primary | ICD-10-CM

## 2018-10-09 PROBLEM — R19.8 GI PROBLEM: Status: ACTIVE | Noted: 2018-10-09

## 2018-10-09 RX ORDER — NORETHINDRONE ACETATE AND ETHINYL ESTRADIOL, ETHINYL ESTRADIOL AND FERROUS FUMARATE 1MG-10(24)
KIT ORAL
Refills: 0 | COMMUNITY
Start: 2018-09-03 | End: 2022-06-08 | Stop reason: SINTOL

## 2018-10-09 NOTE — MR AVS SNAPSHOT
[facility] 
 
 
 [facility] [address] [facility] 
[phone] Patient: Isabelle Gruber MRN: [id number] NNP:[id number] Visit Information Date & Time Provider Department Dept. Phone Encounter #  
 10/9/2018  2:00 PM Myriam Naqvi [facility] [place] [id number] Follow-up Instructions Return if symptoms worsen or fail to improve. Upcoming Health Maintenance Date Due  
 HPV Age 9Y-34Y (1 of 3 - Female 3 Dose Series) 6/4/2008 DTaP/Tdap/Td series (1 - Tdap) 6/4/2018 PAP AKA CERVICAL CYTOLOGY 6/4/2018 Influenza Age 5 to Adult 8/1/2018 Allergies as of 10/9/2018  Review Complete On: 10/9/2018 By: Myriam Naqvi MD  
  
 Severity Noted Reaction Type Reactions Latex  06/27/2015    Other (comments) Latex  12/01/2016    Rash Banana  02/08/2011    Itching Banana  08/03/2012    Itching Kiwi  06/27/2015    Other (comments) Kiwi  12/01/2016    Itching Nut Flavor  02/08/2011    Itching Peanut  08/03/2012    Itching [place]  12/01/2016    Itching Tree Nuts  12/01/2016    Itching Current Immunizations  Never Reviewed No immunizations on file. Not reviewed this visit Vitals BP Pulse Temp Resp Height(growth percentile) Weight(growth percentile) 112/56 (BP 1 Location: Right arm, BP Patient Position: Sitting) 82 98.4 °F (36.9 °C) (Oral) 20 5' 6\" (1.676 m) 178 lb 3.2 oz (80.8 kg) LMP SpO2 BMI OB Status Smoking Status 09/03/2018 99% 28.76 kg/m2 Unknown Never Smoker BMI and BSA Data Body Mass Index Body Surface Area 28.76 kg/m 2 1.94 m 2 Preferred Pharmacy Pharmacy Name Phone [id number] [address] [phone] Your Updated Medication List  
  
   
This list is accurate as of 10/9/18  2:37 PM.  Always use your most recent med list.  
  
  
  
  
 albuterol 90 mcg/actuation inhaler Commonly known as:  PROVENTIL HFA, VENTOLIN HFA, PROAIR HFA Take 2 Puffs by inhalation every four (4) hours as needed for Wheezing. dicyclomine 20 mg tablet Commonly known as:  BENTYL Take 1 Tab by mouth every six (6) hours as needed for up to 20 doses. LO LOESTRIN FE 1 mg-10 mcg (24)/10 mcg (2) Tab Generic drug:  norethindrone-e.estradiol-iron TAKE 1 TABLET BY MOUTH EVERY DAY  
  
 ondansetron 4 mg disintegrating tablet Commonly known as:  ZOFRAN ODT Take 1 Tab by mouth every eight (8) hours as needed for Nausea. Follow-up Instructions Return if symptoms worsen or fail to improve. Introducing [facility] SERVICES! Dear Mateo Alcantar: 
Thank you for requesting a [employer] account. Our records indicate that you already have an active [employer] account. You can access your account anytime at [email or web address]. [email or web address] Did you know that you can access your hospital and ER discharge instructions at any time in [employer]? You can also review all of your test results from your hospital stay or ER visit. Additional Information If you have questions, please visit the Frequently Asked Questions section of the [employer] website at [email or web address] Remember, [employer] is NOT to be used for urgent needs. For medical emergencies, dial 911. Now available from your iPhone and Android! Please provide this summary of care documentation to your next provider. Your primary care clinician is listed as Claude Rush. If you have any questions after today's visit, please call [phone].

## 2018-10-09 NOTE — PROGRESS NOTES
Identified pt with two pt identifiers(name and ). Chief Complaint   Patient presents with    New Patient     Establish new PCP        Health Maintenance Due   Topic    HPV Age 9Y-34Y (1 of 3 - Female 3 Dose Series)    DTaP/Tdap/Td series (1 - Tdap)    PAP AKA CERVICAL CYTOLOGY     Influenza Age 5 to Adult        Wt Readings from Last 3 Encounters:   18 179 lb (81.2 kg)   18 170 lb (77.1 kg)   18 185 lb 13.6 oz (84.3 kg)     Temp Readings from Last 3 Encounters:   18 98.1 °F (36.7 °C)   18 98.1 °F (36.7 °C)   18 98.3 °F (36.8 °C)     BP Readings from Last 3 Encounters:   18 126/85   18 126/76   18 127/79     Pulse Readings from Last 3 Encounters:   18 71   18 67   18 96         Learning Assessment:  :     Learning Assessment 10/9/2018   PRIMARY LEARNER Patient   HIGHEST LEVEL OF EDUCATION - PRIMARY LEARNER  [employer]   BARRIERS PRIMARY LEARNER NONE   CO-LEARNER CAREGIVER No   PRIMARY LANGUAGE ENGLISH   LEARNER PREFERENCE PRIMARY READING   ANSWERED BY Emily Bryan   RELATIONSHIP SELF       Depression Screening:  :     PHQ over the last two weeks 10/9/2018   Little interest or pleasure in doing things Several days   Feeling down, depressed, irritable, or hopeless Not at all   Total Score PHQ 2 1       Fall Risk Assessment:  :     No flowsheet data found. Abuse Screening:  :     Abuse Screening Questionnaire 10/9/2018   Do you ever feel afraid of your partner? N   Are you in a relationship with someone who physically or mentally threatens you? N   Is it safe for you to go home? Y       Coordination of Care Questionnaire:  :     1) Have you been to an emergency room, urgent care clinic since your last visit? yes [facility]   Hospitalized since your last visit? no             2) Have you seen or consulted any other health care providers outside of [address] [place] [address] since your last visit?  yes  [facility] (Include any pap smears or colon screenings in this section.)    3) Do you have an Advance Directive on file? no  Are you interested in receiving information about Advance Directives? no    Reviewed record in preparation for visit and have obtained necessary documentation. Medication reconciliation up to date and corrected with patient at this time.

## 2018-10-09 NOTE — PROGRESS NOTES
CHIEF COMPLAINT:   Chief Complaint   Patient presents with    New Patient     Establish new PCP     HISTORY OF PRESENT ILLNESS:   23F who presents as a new patient to [facility].. She has the below medical history. She is here to establish care and has no major concerns or issues today. She needs to have a pap and also full physical on another day. PAST MEDICAL HISTORY:  Past Medical History:   Diagnosis Date    Acid reflux     Asthma     Bladder incontinence     Depression     Endocrine disease     isulin resistance    Headache(784.0)     IBS (irritable bowel syndrome)     Interstitial cystitis     Irregular menstrual cycle     Vertigo        PAST SURGICAL HISTORY:  Past Surgical History:   Procedure Laterality Date    HX COLONOSCOPY         MEDICATIONS:  Current Outpatient Prescriptions   Medication Sig Dispense Refill    LO LOESTRIN FE 1 mg-10 mcg (24)/10 mcg (2) tab TAKE 1 TABLET BY MOUTH EVERY DAY  0    dicyclomine (BENTYL) 20 mg tablet Take 1 Tab by mouth every six (6) hours as needed for up to 20 doses. 20 Tab 0    ondansetron (ZOFRAN ODT) 4 mg disintegrating tablet Take 1 Tab by mouth every eight (8) hours as needed for Nausea. 20 Tab 0    albuterol (PROVENTIL HFA, VENTOLIN HFA, PROAIR HFA) 90 mcg/actuation inhaler Take 2 Puffs by inhalation every four (4) hours as needed for Wheezing. 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 Social History Main Topics    Smoking status: Never Smoker    Smokeless tobacco: Never Used    Alcohol use Yes      Comment: rarely    Drug use:  No  Sexual activity: No       FAMILY HISTORY:   Family History   Problem Relation Age of Onset   [employer] Arthritis-osteo Mother     Asthma Mother     Headache Mother      migraines    Anxiety Mother     Hypertension Maternal Grandmother     Hypertension Maternal Grandfather     Cancer Maternal Grandfather      prostate    Hypertension Paternal Grandmother     Diabetes Paternal Grandmother     Thyroid Disease Paternal Grandmother     Hypertension Paternal Grandfather     Diabetes Paternal Grandfather     Thyroid Disease Paternal Grandfather        REVIEW OF SYSTEMS:  Review of Systems - Negative except for documented in the HPI. PHYSICAL EXAMINATION:  /56 (BP 1 Location: Right arm, BP Patient Position: Sitting) Comment: Manual  Pulse 82  Temp 98.4 °F (36.9 °C) (Oral)   Resp 20  Ht 5' 6\" (1.676 m)  Wt 178 lb 3.2 oz (80.8 kg)  LMP 09/03/2018  SpO2 99%  BMI 28.76 kg/m2    LABORATORY DATA:  None available. IMPRESSION AND PLAN:   Diagnoses and all orders for this visit:    1. Establishing care with new doctor, encounter for    I have discussed the diagnosis with the patient and the intended treatment plan as seen in the above orders. The patient has received an after-visit summary and questions were answered concerning future plans. Asked to return should symptoms worsen or not improve with treatment. Any pending labs and studies will be relayed to patient when they become available. Pt verbalizes understanding of plan of care and denies further questions or concerns at this time. Follow-up Disposition:  Return if symptoms worsen or fail to improve. Chaya Nunez

## 2018-10-15 ENCOUNTER — PATIENT OUTREACH (OUTPATIENT)
Dept: OTHER | Age: 23
End: 2018-10-15

## 2018-10-15 NOTE — PROGRESS NOTES
HPRP f/u:  Telephone attempt to contact patient for Health Promotion and Risk Prevention. Left discreet message on voicemail with this CC contact information. Will follow for one month for transitions of care needs. Next outreach is 10/25/18 for discussion f/u -  RUQ Abdominal Pain and Resolve Episode.     Chart Review: 10/9/18 Abebe Adame MD Family Practice       Chief Complaint   Patient presents with    New Patient       Establish new PCP

## 2018-10-16 ENCOUNTER — HOSPITAL ENCOUNTER (OUTPATIENT)
Dept: LAB | Age: 23
Discharge: HOME | End: 2018-10-16
Payer: COMMERCIAL

## 2018-10-16 ENCOUNTER — OFFICE VISIT (OUTPATIENT)
Dept: FAMILY MEDICINE CLINIC | Age: 23
End: 2018-10-16

## 2018-10-16 VITALS
HEART RATE: 85 BPM | WEIGHT: 177.8 LBS | HEIGHT: 66 IN | DIASTOLIC BLOOD PRESSURE: 74 MMHG | TEMPERATURE: 98.7 F | OXYGEN SATURATION: 98 % | RESPIRATION RATE: 20 BRPM | SYSTOLIC BLOOD PRESSURE: 104 MMHG | BODY MASS INDEX: 28.57 KG/M2

## 2018-10-16 DIAGNOSIS — Z01.419 WELL WOMAN EXAM WITH ROUTINE GYNECOLOGICAL EXAM: Primary | ICD-10-CM

## 2018-10-16 PROBLEM — F32.A MILD DEPRESSION: Status: ACTIVE | Noted: 2018-10-16

## 2018-10-16 PROCEDURE — 87625 HPV TYPES 16 & 18 ONLY: CPT | Performed by: INTERNAL MEDICINE

## 2018-10-16 PROCEDURE — 88175 CYTOPATH C/V AUTO FLUID REDO: CPT | Performed by: INTERNAL MEDICINE

## 2018-10-16 PROCEDURE — 87624 HPV HI-RISK TYP POOLED RSLT: CPT | Performed by: INTERNAL MEDICINE

## 2018-10-16 NOTE — PROGRESS NOTES
Subjective:   26 y.o. female for Well Woman Check. No LMP recorded. Social History: not sexually active. Pertinent past medical hstory: no history of HTN, DVT, CAD, DM, liver disease, migraines or smoking. Patient Active Problem List    Diagnosis Date Noted    Mild depression ([address] 10/16/2018    GI problem 10/09/2018    Insulin resistance 02/27/2013    Acanthosis 06/21/2012     Current Outpatient Prescriptions   Medication Sig Dispense Refill    LO LOESTRIN FE 1 mg-10 mcg (24)/10 mcg (2) tab TAKE 1 TABLET BY MOUTH EVERY DAY  0    dicyclomine (BENTYL) 20 mg tablet Take 1 Tab by mouth every six (6) hours as needed for up to 20 doses. 20 Tab 0    ondansetron (ZOFRAN ODT) 4 mg disintegrating tablet Take 1 Tab by mouth every eight (8) hours as needed for Nausea. 20 Tab 0    albuterol (PROVENTIL HFA, VENTOLIN HFA, PROAIR HFA) 90 mcg/actuation inhaler Take 2 Puffs by inhalation every four (4) hours as needed for Wheezing.  1 Inhaler 0     Allergies   Allergen Reactions    Latex Other (comments)    Latex Rash    Banana Itching    Banana Itching    Kiwi Other (comments)    Kiwi Itching    Nut Flavor Itching    Peanut Itching    Strawberry Itching    Tree Nuts Itching     Past Medical History:   Diagnosis Date    Acid reflux     Asthma     Bladder incontinence     Depression     Endocrine disease     isulin resistance    Headache(784.0)     IBS (irritable bowel syndrome)     Interstitial cystitis     Irregular menstrual cycle     Vertigo      Past Surgical History:   Procedure Laterality Date    HX COLONOSCOPY       Family History   Problem Relation Age of Onset   24 [facility] Arthritis-osteo Mother     Asthma Mother     Headache Mother      migraines    Anxiety Mother     Hypertension Maternal Grandmother     Hypertension Maternal Grandfather     Cancer Maternal Grandfather      prostate    Hypertension Paternal Grandmother     Diabetes Paternal Grandmother     Thyroid Disease Paternal Grandmother     Hypertension Paternal Grandfather     Diabetes Paternal Grandfather     Thyroid Disease Paternal Grandfather      Social History   Substance Use Topics    Smoking status: Never Smoker    Smokeless tobacco: Never Used    Alcohol use Yes      Comment: rarely        ROS:  Feeling well. No dyspnea or chest pain on exertion. No abdominal pain, change in bowel habits, black or bloody stools. No urinary tract symptoms. GYN ROS: normal menses, no abnormal bleeding, pelvic pain or discharge, no breast pain or new or enlarging lumps on self exam. No neurological complaints. Objective:     Visit Vitals    /74 (BP 1 Location: Left arm, BP Patient Position: Sitting)    Pulse 85    Temp 98.7 °F (37.1 °C) (Oral)    Resp 20    Ht 5' 6\" (1.676 m)    Wt 177 lb 12.8 oz (80.6 kg)    SpO2 98%    BMI 28.7 kg/m2     The patient appears well, alert, oriented x 3, in no distress. ENT normal.  Neck supple. No adenopathy or thyromegaly. ANNELISE. Lungs are clear, good air entry, no wheezes, rhonchi or rales. S1 and S2 normal, no murmurs, regular rate and rhythm. Abdomen soft without tenderness, guarding, mass or organomegaly. Extremities show no edema, normal peripheral pulses. Neurological is normal, no focal findings. BREAST EXAM: breasts appear normal, no suspicious masses, no skin or nipple changes or axillary nodes    PELVIC EXAM: normal external genitalia, vulva, vagina, cervix, uterus and adnexa    Assessment/Plan:       ICD-10-CM ICD-9-CM    1. Well woman exam with routine gynecological exam Z01.419 V72.31 PAP IG, APTIMA HPV AND RFX 16/18,45 ([id number])    Counseled on breast self exam, STD prevention, HIV risk factors and prevention, use and side effects of HRT and adequate intake of calcium and vitamin D. Also, if she has not had her HPV vaccinations, she should get them at this time. Return annually and prn.       I have discussed the diagnosis with the patient and the intended treatment plan as seen in the above orders. The patient has received an after-visit summary and questions were answered concerning future plans. Asked to return should symptoms worsen or not improve with treatment. Any pending labs and studies will be relayed to patient when they become available. Pt verbalizes understanding of plan of care and denies further questions or concerns at this time. Follow-up Disposition:  Return in about 1 year (around 10/16/2019), or if symptoms worsen or fail to improve.

## 2018-10-16 NOTE — PROGRESS NOTES
Identified pt with two pt identifiers(name and ). Chief Complaint   Patient presents with    Well Woman     Pap        Health Maintenance Due   Topic    HPV Age 9Y-34Y (1 of 3 - Female 3 Dose Series)    DTaP/Tdap/Td series (1 - Tdap)    PAP AKA CERVICAL CYTOLOGY     Influenza Age 5 to Adult        Wt Readings from Last 3 Encounters:   10/09/18 178 lb 3.2 oz (80.8 kg)   18 179 lb (81.2 kg)   18 170 lb (77.1 kg)     Temp Readings from Last 3 Encounters:   10/09/18 98.4 °F (36.9 °C) (Oral)   18 98.1 °F (36.7 °C)   18 98.1 °F (36.7 °C)     BP Readings from Last 3 Encounters:   10/09/18 112/56   18 126/85   18 126/76     Pulse Readings from Last 3 Encounters:   10/09/18 82   18 71   18 67         Learning Assessment:  :     Learning Assessment 10/9/2018   PRIMARY LEARNER Patient   HIGHEST LEVEL OF EDUCATION - PRIMARY LEARNER  [employer]   BARRIERS PRIMARY LEARNER NONE   CO-LEARNER CAREGIVER No   PRIMARY LANGUAGE ENGLISH   LEARNER PREFERENCE PRIMARY READING   ANSWERED BY Sarah CASTILLO SELF       Depression Screening:  :     PHQ over the last two weeks 10/16/2018   Little interest or pleasure in doing things Not at all   Feeling down, depressed, irritable, or hopeless Not at all   Total Score PHQ 2 0       Fall Risk Assessment:  :     No flowsheet data found. Abuse Screening:  :     Abuse Screening Questionnaire 10/9/2018   Do you ever feel afraid of your partner? N   Are you in a relationship with someone who physically or mentally threatens you? N   Is it safe for you to go home?  Y       Coordination of Care Questionnaire:  :     1) Have you been to an emergency room, urgent care clinic since your last visit? no   Hospitalized since your last visit? no             2) Have you seen or consulted any other health care providers outside of [address] [place] [address] since your last visit? no  (Include any pap smears or colon screenings in this section.)    3) Do you have an Advance Directive on file? no  Are you interested in receiving information about Advance Directives? no.    Reviewed record in preparation for visit and have obtained necessary documentation. Medication reconciliation up to date and corrected with patient at this time.

## 2018-10-16 NOTE — MR AVS SNAPSHOT
[address] [address] [address] [facility] 
[phone] Patient: Jared Chapa MRN: [id number] KIRK:[birth year] Visit Information Date & Time Provider Department Dept. Phone Encounter #  
 10/16/2018  2:00 PM [facility] [phone] Upcoming Health Maintenance Date Due  
 HPV Age 9Y-34Y (1 of 3 - Female 3 Dose Series) 6/4/2008 DTaP/Tdap/Td series (1 - Tdap) 6/4/2018 PAP AKA CERVICAL CYTOLOGY 6/4/2018 Influenza Age 5 to Adult 8/1/2018 Allergies as of 10/16/2018  Review Complete On: 10/16/2018 By: Satinder Galindo MD  
  
 Severity Noted Reaction Type Reactions Latex  06/27/2015    Other (comments) Latex  12/01/2016    Rash Banana  02/08/2011    Itching Banana  08/03/2012    Itching Kiwi  06/27/2015    Other (comments) Kiwi  12/01/2016    Itching Nut Flavor  02/08/2011    Itching Peanut  08/03/2012    Itching Shunk  12/01/2016    Itching Tree Nuts  12/01/2016    Itching Current Immunizations  Never Reviewed No immunizations on file. Not reviewed this visit You Were Diagnosed With   
  
 Codes Comments Well woman exam with routine gynecological exam    -  Primary ICD-10-CM: K63.130 ICD-9-CM: V72.31 Vitals BP Pulse Temp Resp Height(growth percentile) Weight(growth percentile) 104/74 (BP 1 Location: Left arm, BP Patient Position: Sitting) 85 98.7 °F (37.1 °C) (Oral) 20 5' 6\" (1.676 m) 177 lb 12.8 oz (80.6 kg) SpO2 BMI OB Status Smoking Status 98% 28.7 kg/m2 Unknown Never Smoker BMI and BSA Data Body Mass Index Body Surface Area 28.7 kg/m 2 1.94 m 2 Preferred Pharmacy Pharmacy Name Phone [id number] [place], [address] [place] [address] [phone] Your Updated Medication List  
  
   
This list is accurate as of 10/16/18  2:43 PM.  Always use your most recent med list.  
  
  
  
  
 albuterol 90 mcg/actuation inhaler Commonly known as:  PROVENTIL HFA, VENTOLIN HFA, PROAIR HFA Take 2 Puffs by inhalation every four (4) hours as needed for Wheezing. dicyclomine 20 mg tablet Commonly known as:  BENTYL Take 1 Tab by mouth every six (6) hours as needed for up to 20 doses. LO LOESTRIN FE 1 mg-10 mcg (24)/10 mcg (2) Tab Generic drug:  norethindrone-e.estradiol-iron TAKE 1 TABLET BY MOUTH EVERY DAY  
  
 ondansetron 4 mg disintegrating tablet Commonly known as:  ZOFRAN ODT Take 1 Tab by mouth every eight (8) hours as needed for Nausea. To-Do List   
 10/23/2018 Pathology:  PAP IG, APTIMA HPV AND RFX 16/18,45 ([id number]) Patient Instructions Breast Self-Exam: Care Instructions Your Care Instructions A breast self-exam is when you check your breasts for lumps or changes. This regular exam helps you learn how your breasts normally look and feel. Most breast problems or changes are not because of cancer. Breast self-exam is not a substitute for a mammogram. Having regular breast exams by your doctor and regular mammograms improve your chances of finding any problems with your breasts. Some women set a time each month to do a step-by-step breast self-exam. Other women like a less formal system. They might look at their breasts as they brush their teeth, or feel their breasts once in a while in the shower. If you notice a change in your breast, tell your doctor. Follow-up care is a key part of your treatment and safety. Be sure to make and go to all appointments, and call your doctor if you are having problems. It's also a good idea to know your test results and keep a list of the medicines you take. How do you do a breast self-exam? 
· The best time to examine your breasts is usually one week after your menstrual period begins. Your breasts should not be tender then.  If you do not have periods, you might do your exam on a day of the month that is easy to remember. · To examine your breasts: ¨ Remove all your clothes above the waist and lie down. When you are lying down, your breast tissue spreads evenly over your chest wall, which makes it easier to feel all your breast tissue. ¨ Use the padsnot the fingertipsof the 3 middle fingers of your left hand to check your right breast. Move your fingers slowly in small coin-sized circles that overlap. ¨ Use three levels of pressure to feel of all your breast tissue. Use light pressure to feel the tissue close to the skin surface. Use medium pressure to feel a little deeper. Use firm pressure to feel your tissue close to your breastbone and ribs. Use each pressure level to feel your breast tissue before moving on to the next spot. ¨ Check your entire breast, moving up and down as if following a strip from the collarbone to the bra line, and from the armpit to the ribs. Repeat until you have covered the entire breast. 
¨ Repeat this procedure for your left breast, using the pads of the 3 middle fingers of your right hand. · To examine your breasts while in the shower: 
¨ Place one arm over your head and lightly soap your breast on that side. ¨ Using the pads of your fingers, gently move your hand over your breast (in the strip pattern described above), feeling carefully for any lumps or changes. ¨ Repeat for the other breast. 
· Have your doctor inspect anything you notice to see if you need further testing. Where can you learn more? Go to http://lyric-jamarcus.info/. Enter P148 in the search box to learn more about \"Breast Self-Exam: Care Instructions. \" Current as of: March 28, 2018 Content Version: 11.8 © 1070-4934 [employer]wise, [employer].  Care instructions adapted under license by [employer] (which disclaims liability or warranty for this information). If you have questions about a medical condition or this instruction, always ask your healthcare professional. [address] any warranty or liability for your use of this information. Pelvic Exam: Care Instructions Your Care Instructions When your doctor examines all of your pelvic organs, it's called a pelvic exam. Two good reasons to have this kind of exam are to check for sexually transmitted infections (STIs) and to get a Pap test. A Pap test is also called a Pap smear. It checks for early changes that can lead to cancer of the cervix. Sometimes a pelvic exam is part of a regular checkup. In this case, you can do some things to make your test results as accurate as possible. · Try to schedule the exam when you don't have your period. · Don't use douches, tampons, or vaginal medicines, sprays, or powders for 24 hours before your exam. 
· Don't have sex for 24 hours before your exam. 
Other times, women have this kind of exam at any time of the month. This is because they have pelvic pain, bleeding, or discharge. Or they may have another pelvic problem. Before your exam, it's important to share some information with your doctor. For example, if you are a survivor of rape or sexual abuse, you can talk about any concerns you may have. Your doctor will also want to know if you are pregnant or use birth control. And he or she will want to hear about any problems, surgeries, or procedures you have had in your pelvic area. You will also need to tell your doctor when your last period was. Follow-up care is a key part of your treatment and safety. Be sure to make and go to all appointments, and call your doctor if you are having problems. It's also a good idea to know your test results and keep a list of the medicines you take. How is a pelvic exam done? · During a pelvic exam, you will: ¨ Take off your clothes below the waist. You will get a paper or cloth cover to put over the lower half of your body. Yolis Cobble on your back on an exam table. Your feet will be raised above you. Stirrups will support your feet. · The doctor will: ¨ Ask you to relax your knees. Your knees need to lean out, toward the walls. ¨ Check the opening of your vagina for sores or swelling. ¨ Gently put a tool called a speculum into your vagina. It opens the vagina a little bit. You will feel some pressure. But if you are relaxed, it will not hurt. It lets your doctor see inside the vagina. ¨ Use a small brush, spatula, or swab to get a sample of cells, if you are having a Pap test or culture. The doctor then removes the speculum. ¨ Put on gloves and put one or two fingers of one hand into your vagina. The other hand goes on your lower belly. This lets your doctor feel your pelvic organs. You will probably feel some pressure. Try to stay relaxed. ¨ Put one gloved finger into your rectum and one into your vagina, if needed. This can also help check your pelvic organs. This exam takes about 10 minutes. At the end, you will get a washcloth or tissue to clean your vaginal area. It's normal to have some discharge after this exam. You can then get dressed. Some test results may be ready right away. But results from a culture or a Pap test may take several days or a few weeks. Why should you have a pelvic exam? 
· You want to have recommended screening tests. This includes a Pap test. 
· You think you have a vaginal infection. Signs include itching, burning, or unusual discharge. · You might have been exposed to a sexually transmitted infection (STI), such as chlamydia or herpes. · You have vaginal bleeding that is not part of your normal menstrual period. · You have pain in your belly or pelvis. · You have been sexually assaulted. A pelvic exam lets your doctor collect evidence and check for STIs. · You are pregnant. · You are having trouble getting pregnant. What are the risks of a pelvic exam? 
There are no risks from a pelvic exam. 
When should you call for help? Watch closely for changes in your health, and be sure to contact your doctor if you have any problems. Where can you learn more? Go to http://lyric-jamarcus.info/. Enter A719 in the search box to learn more about \"Pelvic Exam: Care Instructions. \" Current as of: May 15, 2018 Content Version: 11.8 © 2221-6199 [employer]. Care instructions adapted under license by [employer] (which disclaims liability or warranty for this information). If you have questions about a medical condition or this instruction, always ask your healthcare professional. [address] [address] any warranty or liability for your use of this information. Introducing [address]! Dear Robbi Silva: 
Thank you for requesting a JLGOV account. Our records indicate that you already have an active JLGOV account. You can access your account anytime at [email or web address] Did you know that you can access your hospital and ER discharge instructions at any time in JLGOV? You can also review all of your test results from your hospital stay or ER visit. Additional Information If you have questions, please visit the Frequently Asked Questions section of the JLGOV website at [email or web address] Remember, JLGOV is NOT to be used for urgent needs. For medical emergencies, dial 911. Now available from your iPhone and Android! Please provide this summary of care documentation to your next provider. Your primary care clinician is listed as Shyela Merino. If you have any questions after today's visit, please call [phone].

## 2018-10-16 NOTE — PATIENT INSTRUCTIONS
Breast Self-Exam: Care Instructions  Your Care Instructions    A breast self-exam is when you check your breasts for lumps or changes. This regular exam helps you learn how your breasts normally look and feel. Most breast problems or changes are not because of cancer. Breast self-exam is not a substitute for a mammogram. Having regular breast exams by your doctor and regular mammograms improve your chances of finding any problems with your breasts. Some women set a time each month to do a step-by-step breast self-exam. Other women like a less formal system. They might look at their breasts as they brush their teeth, or feel their breasts once in a while in the shower. If you notice a change in your breast, tell your doctor. Follow-up care is a key part of your treatment and safety. Be sure to make and go to all appointments, and call your doctor if you are having problems. It's also a good idea to know your test results and keep a list of the medicines you take. How do you do a breast self-exam?  · The best time to examine your breasts is usually one week after your menstrual period begins. Your breasts should not be tender then. If you do not have periods, you might do your exam on a day of the month that is easy to remember. · To examine your breasts:  ¨ Remove all your clothes above the waist and lie down. When you are lying down, your breast tissue spreads evenly over your chest wall, which makes it easier to feel all your breast tissue. ¨ Use the pads--not the fingertips--of the 3 middle fingers of your left hand to check your right breast. Move your fingers slowly in small coin-sized circles that overlap. ¨ Use three levels of pressure to feel of all your breast tissue. Use light pressure to feel the tissue close to the skin surface. Use medium pressure to feel a little deeper. Use firm pressure to feel your tissue close to your breastbone and ribs.  Use each pressure level to feel your breast tissue before moving on to the next spot. ¨ Check your entire breast, moving up and down as if following a strip from the collarbone to the bra line, and from the armpit to the ribs. Repeat until you have covered the entire breast.  ¨ Repeat this procedure for your left breast, using the pads of the 3 middle fingers of your right hand. · To examine your breasts while in the shower:  ¨ Place one arm over your head and lightly soap your breast on that side. ¨ Using the pads of your fingers, gently move your hand over your breast (in the strip pattern described above), feeling carefully for any lumps or changes. ¨ Repeat for the other breast.  · Have your doctor inspect anything you notice to see if you need further testing. Where can you learn more? Go to http://lyric-jamarcus.info/. Enter P148 in the search box to learn more about \"Breast Self-Exam: Care Instructions. \"  Current as of: March 28, 2018  Content Version: 11.8  © 7664-8667 [employer]. Care instructions adapted under license by [employer] (which disclaims liability or warranty for this information). If you have questions about a medical condition or this instruction, always ask your healthcare professional. [place] [address] any warranty or liability for your use of this information. Pelvic Exam: Care Instructions  Your Care Instructions    When your doctor examines all of your pelvic organs, it's called a pelvic exam. Two good reasons to have this kind of exam are to check for sexually transmitted infections (STIs) and to get a Pap test. A Pap test is also called a Pap smear. It checks for early changes that can lead to cancer of the cervix. Sometimes a pelvic exam is part of a regular checkup. In this case, you can do some things to make your test results as accurate as possible. · Try to schedule the exam when you don't have your period.   · Don't use douches, tampons, or vaginal medicines, sprays, or powders for 24 hours before your exam.  · Don't have sex for 24 hours before your exam.  Other times, women have this kind of exam at any time of the month. This is because they have pelvic pain, bleeding, or discharge. Or they may have another pelvic problem. Before your exam, it's important to share some information with your doctor. For example, if you are a survivor of rape or sexual abuse, you can talk about any concerns you may have. Your doctor will also want to know if you are pregnant or use birth control. And he or she will want to hear about any problems, surgeries, or procedures you have had in your pelvic area. You will also need to tell your doctor when your last period was. Follow-up care is a key part of your treatment and safety. Be sure to make and go to all appointments, and call your doctor if you are having problems. It's also a good idea to know your test results and keep a list of the medicines you take. How is a pelvic exam done? · During a pelvic exam, you will:  ¨ Take off your clothes below the waist. You will get a paper or cloth cover to put over the lower half of your body. Bettey Rounds on your back on an exam table. Your feet will be raised above you. Stirrups will support your feet. · The doctor will:  Roberto Main you to relax your knees. Your knees need to lean out, toward the walls. ¨ Check the opening of your vagina for sores or swelling. ¨ Gently put a tool called a speculum into your vagina. It opens the vagina a little bit. You will feel some pressure. But if you are relaxed, it will not hurt. It lets your doctor see inside the vagina. ¨ Use a small brush, spatula, or swab to get a sample of cells, if you are having a Pap test or culture. The doctor then removes the speculum. ¨ Put on gloves and put one or two fingers of one hand into your vagina. The other hand goes on your lower belly. This lets your doctor feel your pelvic organs.  You will probably feel some pressure. Try to stay relaxed. ¨ Put one gloved finger into your rectum and one into your vagina, if needed. This can also help check your pelvic organs. This exam takes about 10 minutes. At the end, you will get a washcloth or tissue to clean your vaginal area. It's normal to have some discharge after this exam. You can then get dressed. Some test results may be ready right away. But results from a culture or a Pap test may take several days or a few weeks. Why should you have a pelvic exam?  · You want to have recommended screening tests. This includes a Pap test.  · You think you have a vaginal infection. Signs include itching, burning, or unusual discharge. · You might have been exposed to a sexually transmitted infection (STI), such as chlamydia or herpes. · You have vaginal bleeding that is not part of your normal menstrual period. · You have pain in your belly or pelvis. · You have been sexually assaulted. A pelvic exam lets your doctor collect evidence and check for STIs. · You are pregnant. · You are having trouble getting pregnant. What are the risks of a pelvic exam?  There are no risks from a pelvic exam.  When should you call for help? Watch closely for changes in your health, and be sure to contact your doctor if you have any problems. Where can you learn more? Go to http://lyric-jamarcus.info/. Enter E092 in the search box to learn more about \"Pelvic Exam: Care Instructions. \"  Current as of: May 15, 2018  Content Version: 11.8  © 4917-0023 [employer]. Care instructions adapted under license by [employer] (which disclaims liability or warranty for this information). If you have questions about a medical condition or this instruction, always ask your healthcare professional. [address] any warranty or liability for your use of this information.

## 2018-10-25 ENCOUNTER — PATIENT OUTREACH (OUTPATIENT)
Dept: OTHER | Age: 23
End: 2018-10-25

## 2018-10-31 ENCOUNTER — TELEPHONE (OUTPATIENT)
Dept: FAMILY MEDICINE CLINIC | Age: 23
End: 2018-10-31

## 2018-10-31 DIAGNOSIS — R87.612 LOW GRADE SQUAMOUS INTRAEPITHELIAL LESION ON CYTOLOGIC SMEAR OF CERVIX (LGSIL): Primary | ICD-10-CM

## 2019-04-11 ENCOUNTER — OFFICE VISIT (OUTPATIENT)
Dept: FAMILY MEDICINE CLINIC | Age: 24
End: 2019-04-11

## 2019-04-11 VITALS
RESPIRATION RATE: 18 BRPM | OXYGEN SATURATION: 98 % | TEMPERATURE: 98.2 F | BODY MASS INDEX: 29.47 KG/M2 | HEART RATE: 75 BPM | SYSTOLIC BLOOD PRESSURE: 120 MMHG | HEIGHT: 66 IN | WEIGHT: 183.4 LBS | DIASTOLIC BLOOD PRESSURE: 83 MMHG

## 2019-04-11 DIAGNOSIS — Z02.0 SCHOOL PHYSICAL EXAM: Primary | ICD-10-CM

## 2019-04-11 NOTE — PATIENT INSTRUCTIONS
Tuberculin Skin Test: Care Instructions  Your Care Instructions    Tuberculosis (TB) is a bacterial infection that can damage the lungs or other parts of the body. The TB skin test can tell if you have TB bacteria in your body. Many people are exposed to TB and test positive for TB bacteria in their bodies, but they don't get the disease. TB bacteria can stay in your body without making you sick. This is because your immune system can keep TB in check. Your doctor may want you to have a TB skin test if you have been in close contact with someone who has TB. Or you may need the test if you have symptoms that might be caused by TB, such as a cough that does not go away, a fever, or weight loss. You also may get the test if you are a health care worker. During the skin test, part of a TB bacterium is injected under your skin. The test will feel like a skin prick. If you have TB bacteria in your body, a firm red bump will form at the shot site within 2 days. If the test shows that you are infected with TB (positive), your doctor probably will order more tests. A TB-positive skin test can't tell when you became infected with TB. And it can't tell whether the infection can be passed to others. Follow-up care is a key part of your treatment and safety. Be sure to make and go to all appointments, and call your doctor if you are having problems. It's also a good idea to know your test results and keep a list of the medicines you take. How can you care for yourself at home? · Do not scratch the test site. Scratching it may cause redness or swelling. This could affect the test results. · To ease itching, put a cold washcloth on the site. Then pat the site dry. · Do not cover the test site with a bandage or other dressing. · Go back to your doctor's office or hospital to have the test read on the follow-up date. This must be done between 48 and 72 hours after you get the shot. When should you call for help?   Watch closely for changes in your health, and be sure to contact your doctor if you have any problems. Where can you learn more? Go to http://lyric-jamarcus.info/. Enter [phone] in the search box to learn more about \"Tuberculin Skin Test: Care Instructions. \"  Current as of: July 30, 2018  Content Version: 11.9  © 5578-8914 [employer]. Care instructions adapted under license by [employer] (which disclaims liability or warranty for this information). If you have questions about a medical condition or this instruction, always ask your healthcare professional. [place] [address] any warranty or liability for your use of this information.

## 2019-04-11 NOTE — PROGRESS NOTES
Subjective:   26 y.o. female for Well Woman Check. Her gyne and breast care is done elsewhere by her Ob-Gyne physician. 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 Patient Active Problem List    Diagnosis Date Noted    Mild depression ([address] 10/16/2018    GI problem 10/09/2018    Insulin resistance 02/27/2013    Acanthosis 06/21/2012     Current Outpatient Medications   Medication Sig Dispense Refill    SENNA by Does Not Apply route as needed.  LO LOESTRIN FE 1 mg-10 mcg (24)/10 mcg (2) tab TAKE 1 TABLET BY MOUTH EVERY DAY  0    dicyclomine (BENTYL) 20 mg tablet Take 1 Tab by mouth every six (6) hours as needed for up to 20 doses. 20 Tab 0    ondansetron (ZOFRAN ODT) 4 mg disintegrating tablet Take 1 Tab by mouth every eight (8) hours as needed for Nausea. 20 Tab 0    albuterol (PROVENTIL HFA, VENTOLIN HFA, PROAIR HFA) 90 mcg/actuation inhaler Take 2 Puffs by inhalation every four (4) hours as needed for Wheezing.  1 Inhaler 0     Allergies   Allergen Reactions    Latex Other (comments)    Latex Rash    Banana Itching    Banana Itching    Kiwi Other (comments)    Kiwi Itching    Nut Flavor Itching    Peanut Itching    Strawberry Itching    Tree Nuts Itching     Past Medical History:   Diagnosis Date    Acid reflux     Asthma     Bladder incontinence     Depression     Endocrine disease     isulin resistance    Headache(784.0)     IBS (irritable bowel syndrome)     Interstitial cystitis     Irregular menstrual cycle     Vertigo      Past Surgical History:   Procedure Laterality Date    HX COLONOSCOPY       Family History   Problem Relation Age of Onset    Arthritis-osteo Mother     Asthma Mother     Headache Mother         migraines    Anxiety Mother     Hypertension Maternal Grandmother     Hypertension Maternal Grandfather     Cancer Maternal Grandfather         prostate    Hypertension Paternal Grandmother     Diabetes Paternal Grandmother     Thyroid Disease Paternal Grandmother     Hypertension Paternal Grandfather     Diabetes Paternal Grandfather     Thyroid Disease Paternal Grandfather      Social History     Tobacco Use    Smoking status: Never Smoker    Smokeless tobacco: Never Used   Substance Use Topics    Alcohol use: Yes     Comment: rarely        Lab Results   Component Value Date/Time    WBC 7.9 09/22/2018 11:56 AM    HGB 12.9 09/22/2018 11:56 AM    HCT 40.2 09/22/2018 11:56 AM    PLATELET 494 16/09/5482 11:56 AM    MCV 87.0 09/22/2018 11:56 AM     No results found for: INR, PTMR, PTP, PT1, PT2      ROS: Feeling generally well. No TIA's or unusual headaches, no dysphagia. No prolonged cough. No dyspnea or chest pain on exertion. No abdominal pain, change in bowel habits, black or bloody stools. No urinary tract symptoms. No new or unusual musculoskeletal symptoms. Specific concerns today: Pt is in need of immunization records and labs for school entrance. Pt has no concerns or complaints at this time. Objective: The patient appears well, alert, oriented x 3, in no distress. Visit Vitals  /83 (BP 1 Location: Left arm, BP Patient Position: Sitting)   Pulse 75   Temp 98.2 °F (36.8 °C) (Oral)   Resp 18   Ht 5' 6.34\" (1.685 m)   Wt 183 lb 6.4 oz (83.2 kg)   LMP 03/31/2019   SpO2 98%   BMI 29.30 kg/m²     ENT normal.  Neck supple. No adenopathy or thyromegaly. ANNELISE. Lungs are clear, good air entry, no wheezes, rhonchi or rales. S1 and S2 normal, no murmurs, regular rate and rhythm. Abdomen soft without tenderness, guarding, mass or organomegaly. Extremities show no edema, normal peripheral pulses. Neurological is normal, no focal findings. Breast and Pelvic exams are deferred.     Assessment/Plan:   Well Woman  lose weight, increase physical activity    ICD-10-CM ICD-9-CM    1. School physical exam Z02.0 V70.5 10-PANEL URINE DRUG SCREEN      HEPATITIS B SURF AB QUANT      QUANTIFERON-TB GOLD PLUS     Informed patient that we will notify her when labs return, and inform her of any change in plan of care at that time. Pt informed to return to office with worsening of symptoms, or PRN with any questions or concerns. Pt verbalizes understanding of plan of care and denies further questions or concerns at this time.

## 2019-04-12 LAB
AMPHETAMINES UR QL SCN: NEGATIVE NG/ML
BARBITURATES UR QL SCN: NEGATIVE NG/ML
BENZODIAZ UR QL: NEGATIVE NG/ML
BZE UR QL: NEGATIVE NG/ML
CANNABINOIDS UR QL SCN: NEGATIVE NG/ML
HBV SURFACE AB SER-ACNC: 7 MIU/ML
MDMA UR QL SCN: NEGATIVE NG/ML
METHADONE UR QL SCN: NEGATIVE NG/ML
METHAQUALONE UR QL: NEGATIVE NG/ML
OPIATES UR QL: NEGATIVE NG/ML
PCP UR QL: NEGATIVE NG/ML
PROPOXYPH UR QL SCN: NEGATIVE NG/ML

## 2019-04-12 NOTE — PROGRESS NOTES
Please call patient and let her know that some of her labs have returned. Her Hep B is showing that she is not immune. She has completed the series of vaccines, but her school may make her repeat vaccine series?   She should check with school  We will let her know when tb returns  Thanks

## 2019-04-15 ENCOUNTER — TELEPHONE (OUTPATIENT)
Dept: FAMILY MEDICINE CLINIC | Age: 24
End: 2019-04-15

## 2019-04-15 LAB
GAMMA INTERFERON BACKGROUND BLD IA-ACNC: 0.01 IU/ML
M TB IFN-G BLD-IMP: NEGATIVE
M TB IFN-G CD4+ BCKGRND COR BLD-ACNC: 0.05 IU/ML
MITOGEN IGNF BLD-ACNC: >10 IU/ML
QUANTIFERON INCUBATION, QF1T: NORMAL
QUANTIFERON TB2 AG: 0.06 IU/ML
SERVICE CMNT-IMP: NORMAL

## 2019-04-15 NOTE — TELEPHONE ENCOUNTER
----- Message from Trey Diane NP sent at 4/15/2019  9:36 AM EDT -----  Please call patient and let her know that her tb returned and is negative.   She can come  form whenever she is able  Thanks

## 2019-04-15 NOTE — PROGRESS NOTES
Please call patient and let her know that her tb returned and is negative.   She can come  form whenever she is able  Thanks

## 2019-04-15 NOTE — TELEPHONE ENCOUNTER
----- Message from Romero Moses NP sent at 4/12/2019  1:56 PM EDT -----  Please call patient and let her know that some of her labs have returned. Her Hep B is showing that she is not immune. She has completed the series of vaccines, but her school may make her repeat vaccine series?   She should check with school  We will let her know when tb returns  Thanks

## 2019-04-16 ENCOUNTER — CLINICAL SUPPORT (OUTPATIENT)
Dept: FAMILY MEDICINE CLINIC | Age: 24
End: 2019-04-16

## 2019-04-16 DIAGNOSIS — Z23 ENCOUNTER FOR IMMUNIZATION: Primary | ICD-10-CM

## 2019-04-17 VITALS
BODY MASS INDEX: 29.41 KG/M2 | RESPIRATION RATE: 18 BRPM | DIASTOLIC BLOOD PRESSURE: 74 MMHG | OXYGEN SATURATION: 97 % | WEIGHT: 183 LBS | SYSTOLIC BLOOD PRESSURE: 118 MMHG | TEMPERATURE: 98.4 F | HEART RATE: 74 BPM | HEIGHT: 66 IN

## 2019-04-17 NOTE — PROGRESS NOTES
Vernell Miller is a 26 y.o. female      Patient nurse visit for hep B booster   Chief Complaint   Patient presents with    Immunization/Injection     hep B nurse visit        1. Have you been to the ER, urgent care clinic since your last visit? Hospitalized since your last visit? No  M  2. Have you seen or consulted any other health care providers outside of the [address] [place] [address] since your last visit? Include any pap smears or colon screening. No      There were no vitals taken for this visit. Health Maintenance Due   Topic Date Due    HPV Age 9Y-34Y (2 - Female 3-dose series) 09/26/2017         Medication Reconciliation completed, changes noted.   Please  Update medication list.

## 2019-08-02 ENCOUNTER — OFFICE VISIT (OUTPATIENT)
Dept: FAMILY MEDICINE CLINIC | Age: 24
End: 2019-08-02

## 2019-08-02 VITALS
SYSTOLIC BLOOD PRESSURE: 100 MMHG | OXYGEN SATURATION: 100 % | TEMPERATURE: 98.1 F | HEIGHT: 66 IN | BODY MASS INDEX: 31.05 KG/M2 | RESPIRATION RATE: 20 BRPM | DIASTOLIC BLOOD PRESSURE: 60 MMHG | WEIGHT: 193.2 LBS | HEART RATE: 64 BPM

## 2019-08-02 DIAGNOSIS — S13.9XXA NECK SPRAIN, INITIAL ENCOUNTER: Primary | ICD-10-CM

## 2019-08-02 RX ORDER — TIZANIDINE 2 MG/1
TABLET ORAL
Qty: 30 TAB | Refills: 0 | Status: SHIPPED | OUTPATIENT
Start: 2019-08-02 | End: 2022-06-08 | Stop reason: SINTOL
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
## 2019-08-02 NOTE — PATIENT INSTRUCTIONS
Acute Cervical Pain Care  1. Salon Pas 4% Lidocaine patches. Apply directly to the area of discomfort. Leave on for 12 hours. Off for 12 hours. 2. Motrin or Ibuprofen (if not contraindicated) 600-800 mgs. WITH FOOD every 8 hours for 1-2 days, then as needed every 8 hours. 3. Warm compress or heating pad. Avoid high temperatures and getting burned. Monitor closely. 4. Muscle Relaxant as prescribed   5. Consider PT if not getting better and/or xray. 6. Gentle stretching exercises as per the after visit summary. 7. Return as needed. Neck Strain or Sprain: Rehab Exercises  Introduction  Here are some examples of exercises for you to try. The exercises may be suggested for a condition or for rehabilitation. Start each exercise slowly. Ease off the exercises if you start to have pain. You will be told when to start these exercises and which ones will work best for you. How to do the exercises  Neck rotation    1. Sit in a firm chair, or stand up straight. 2. Keeping your chin level, turn your head to the right, and hold for 15 to 30 seconds. 3. Turn your head to the left and hold for 15 to 30 seconds. 4. Repeat 2 to 4 times to each side. Neck stretches    1. Look straight ahead, and tip your right ear to your right shoulder. Do not let your left shoulder rise up as you tip your head to the right. 2. Hold for 15 to 30 seconds. 3. Tilt your head to the left. Do not let your right shoulder rise up as you tip your head to the left. 4. Hold for 15 to 30 seconds. 5. Repeat 2 to 4 times to each side. Forward neck flexion    1. Sit in a firm chair, or stand up straight. 2. Bend your head forward. 3. Hold for 15 to 30 seconds. 4. Repeat 2 to 4 times. Lateral (side) bend strengthening    1. With your right hand, place your first two fingers on your right temple. 2. Start to bend your head to the side while using gentle pressure from your fingers to keep your head from bending.   3. Hold for about 6 seconds. 4. Repeat 8 to 12 times. 5. Switch hands and repeat the same exercise on your left side. Forward bend strengthening    1. Place your first two fingers of either hand on your forehead. 2. Start to bend your head forward while using gentle pressure from your fingers to keep your head from bending. 3. Hold for about 6 seconds. 4. Repeat 8 to 12 times. Neutral position strengthening    1. Using one hand, place your fingertips on the back of your head at the top of your neck. 2. Start to bend your head backward while using gentle pressure from your fingers to keep your head from bending. 3. Hold for about 6 seconds. 4. Repeat 8 to 12 times. Chin tuck    1. Lie on the floor with a rolled-up towel under your neck. Your head should be touching the floor. 2. Slowly bring your chin toward your chest.  3. Hold for a count of 6, and then relax for up to 10 seconds. 4. Repeat 8 to 12 times. Follow-up care is a key part of your treatment and safety. Be sure to make and go to all appointments, and call your doctor if you are having problems. It's also a good idea to know your test results and keep a list of the medicines you take. Where can you learn more? Go to http://lyric-jamarcus.info/. Enter M679 in the search box to learn more about \"Neck Strain or Sprain: Rehab Exercises. \"  Current as of: September 20, 2018  Content Version: 12.1  © 2068-4944 Healthwise, [employer]. Care instructions adapted under license by [employer] (which disclaims liability or warranty for this information). If you have questions about a medical condition or this instruction, always ask your healthcare professional. [place] [address] any warranty or liability for your use of this information.
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
## 2019-08-26 ENCOUNTER — HOSPITAL ENCOUNTER (OUTPATIENT)
Dept: PHYSICAL THERAPY | Age: 24
Discharge: HOME | End: 2019-08-26
Payer: COMMERCIAL

## 2019-08-26 PROCEDURE — 97140 MANUAL THERAPY 1/> REGIONS: CPT | Performed by: PHYSICAL THERAPIST

## 2019-08-26 PROCEDURE — 97161 PT EVAL LOW COMPLEX 20 MIN: CPT | Performed by: PHYSICAL THERAPIST

## 2019-08-26 PROCEDURE — 97110 THERAPEUTIC EXERCISES: CPT | Performed by: PHYSICAL THERAPIST
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
## 2019-08-29 ENCOUNTER — HOSPITAL ENCOUNTER (OUTPATIENT)
Dept: PHYSICAL THERAPY | Age: 24
Discharge: HOME | End: 2019-08-29
Payer: COMMERCIAL

## 2019-08-29 PROCEDURE — 97140 MANUAL THERAPY 1/> REGIONS: CPT

## 2019-08-29 PROCEDURE — 97110 THERAPEUTIC EXERCISES: CPT

## 2019-08-29 NOTE — PROGRESS NOTES
PT DAILY TREATMENT NOTE 2-15    Patient Name: Miguel Don  Date:2019  : 1995  [x]  Patient  Verified  Payor: Nidia Connor / Plan: Syed Boswell / Product Type: PPO /    In time:4:30p  Out time:5:40p  Total Treatment Time (min): 70  Visit #:  2    Treatment Area: Neck pain [M54.2]    SUBJECTIVE  Pain Level (0-10 scale): 3  Any medication changes, allergies to medications, adverse drug reactions, diagnosis change, or new procedure performed?: [x] No    [] Yes (see summary sheet for update)  Subjective functional status/changes:   [] No changes reported  Patient report compliance with HEP. Patient states she has a stretss related HA from work today, but feels good otherwise. 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     OBJECTIVE    Modality rationale: decrease pain and increase tissue extensibility to improve the patients ability to lift, smith,y reach and complete ADL's   Min Type Additional Details       [] Estim: []Att   []Unatt    []TENS instruct                  []IFC  []Premod   []NMES                     []Other:  []w/US   []w/ice   []w/heat  Position:  Location:       []  Traction: [] Cervical       []Lumbar                       [] Prone          []Supine                       []Intermittent   []Continuous Lbs:  [] before manual  [] after manual  []w/heat    []  Ultrasound: []Continuous   [] Pulsed                       at: []1MHz   []3MHz Location:  W/cm2:    [] Paraffin         Location:   []w/heat   15 []  Ice     [x]  Heat  []  Ice massage Position:supine  Location:neck    []  Laser  []  Other: Position:  Location:      []  Vasopneumatic Device Pressure:       [] lo [] med [] hi   Temperature:      [x] Skin assessment post-treatment:  [x]intact []redness- no adverse reaction    []redness - adverse reaction:         40 min Therapeutic Exercise:  [x] See flow sheet :   Rationale: increase ROM, increase strength, improve coordination, improve balance and increase proprioception to improve the patients ability to lift, carry, reach and complete ADL's    15 min Manual Therapy:  MFR B UT, Levator, cervical paraspinals, manual traction with SOR   Rationale: decrease pain, increase ROM, increase tissue extensibility and decrease trigger points  to improve the patients ability to lift, carry, reach and complete ADL's     With   [] TE   [] TA   [] neuro   [] other: Patient Education: [x] Review HEP    [] Progressed/Changed HEP based on:   [] positioning   [] body mechanics   [] transfers   [] heat/ice application    [] other:      Other Objective/Functional Measures:      Pain Level (0-10 scale) post treatment: 0    ASSESSMENT/Changes in Function:   Pt able to advance several exercises with no increased pain throughout. Pt required verbal cues for proper exercise reproduction and breath coordination. Patient will continue to benefit from skilled PT services to modify and progress therapeutic interventions, address functional mobility deficits, address ROM deficits, address strength deficits, analyze and address soft tissue restrictions, analyze and cue movement patterns, analyze and modify body mechanics/ergonomics, assess and modify postural abnormalities and address imbalance/dizziness to attain remaining goals. []  See Plan of Care  []  See progress note/recertification  []  See Discharge Summary         Progress towards goals / Updated goals:  Short Term Goals: To be accomplished in 4 weeks:  1) Pt will be independent with HEP  2) Pt will be able to sit with upright posture >/= 5 minutes without increase of symptoms  3) Pt will report symmetrical  cervical Rotation to look over shoulders while driving. 4) Pt report  Decrease in headaches by 50%.    Long Term Goals:  To be accomplished in 8 weeks:  1) Pt will be able to read/study without increase of neck pain  2) Pt will be able to look over shoulders while driving without increase of neck pain  3) Pt will demonstrate ability to lift >/=  40 lbs without increase of symptoms  4) Pt will be able to sleep through the night without waking in pain  5) Pt will be able to rise from supine to sitting without head lag or pain. Lord Brown       PLAN  [x]  Upgrade activities as tolerated     [x]  Continue plan of care  [x]  Update interventions per flow sheet       []  Discharge due to:_  []  Other:_      Mat Acosta 8/29/2019

## 2019-09-04 ENCOUNTER — HOSPITAL ENCOUNTER (OUTPATIENT)
Dept: PHYSICAL THERAPY | Age: 24
Discharge: HOME | End: 2019-09-04
Payer: COMMERCIAL

## 2019-09-04 PROCEDURE — 97140 MANUAL THERAPY 1/> REGIONS: CPT | Performed by: PHYSICAL MEDICINE & REHABILITATION

## 2019-09-04 PROCEDURE — 97110 THERAPEUTIC EXERCISES: CPT | Performed by: PHYSICAL MEDICINE & REHABILITATION

## 2019-09-04 NOTE — PROGRESS NOTES
PT DAILY TREATMENT NOTE 2-15    Patient Name: Lev Love  Date:2019  : 1995  [x]  Patient  Verified  Payor: Libia Jesus / Plan: Malka Dee / Product Type: PPO /    In time:930 am Out time:1030 am  Total Treatment Time (min): 60  Visit #:  3    Treatment Area: Neck pain [M54.2]    SUBJECTIVE  Pain Level (0-10 scale): 4  Any medication changes, allergies to medications, adverse drug reactions, diagnosis change, or new procedure performed?: [x] No    [] Yes (see summary sheet for update)  Subjective functional status/changes:   [] No changes reported  Patient report she had a slight HA on  while at [facility] and Saturday she had some more neck pain from preparing a cookout.      OBJECTIVE    Modality rationale: decrease pain and increase tissue extensibility to improve the patients ability to lift, smith,y reach and complete ADL's   Min Type Additional Details       [] Estim: []Att   []Unatt    []TENS instruct                  []IFC  []Premod   []NMES                     []Other:  []w/US   []w/ice   []w/heat  Position:  Location:       []  Traction: [] Cervical       []Lumbar                       [] Prone          []Supine                       []Intermittent   []Continuous Lbs:  [] before manual  [] after manual  []w/heat    []  Ultrasound: []Continuous   [] Pulsed                       at: []1MHz   []3MHz Location:  W/cm2:    [] Paraffin         Location:   []w/heat   15 []  Ice     [x]  Heat  []  Ice massage Position:supine  Location:neck    []  Laser  []  Other: Position:  Location:      []  Vasopneumatic Device Pressure:       [] lo [] med [] hi   Temperature:      [x] Skin assessment post-treatment:  [x]intact []redness- no adverse reaction    []redness - adverse reaction:         30 min Therapeutic Exercise:  [x] See flow sheet :   Rationale: increase ROM, increase strength, improve coordination, improve balance and increase proprioception to improve the patients ability to lift, carry, reach and complete ADL's    15 min Manual Therapy:  Cervical traction, SOR, UT stretch, P/A mobs to cervical spine gd III/IV   Rationale: decrease pain, increase ROM, increase tissue extensibility and decrease trigger points  to improve the patients ability to lift, carry, reach and complete ADL's     With   [] TE   [] TA   [] neuro   [] other: Patient Education: [x] Review HEP    [] Progressed/Changed HEP based on:   [] positioning   [] body mechanics   [] transfers   [] heat/ice application    [] other:      Other Objective/Functional Measures: Mod fatigue with today's band exercises    Pain Level (0-10 scale) post treatment: 0    ASSESSMENT/Changes in Function:     Patient will continue to benefit from skilled PT services to modify and progress therapeutic interventions, address functional mobility deficits, address ROM deficits, address strength deficits, analyze and address soft tissue restrictions, analyze and cue movement patterns, analyze and modify body mechanics/ergonomics, assess and modify postural abnormalities and address imbalance/dizziness to attain remaining goals. []  See Plan of Care  []  See progress note/recertification  []  See Discharge Summary         Progress towards goals / Updated goals:  Short Term Goals: To be accomplished in 4 weeks:  1) Pt will be independent with HEP  2) Pt will be able to sit with upright posture >/= 5 minutes without increase of symptoms  3) Pt will report symmetrical  cervical Rotation to look over shoulders while driving. 4) Pt report  Decrease in headaches by 50%.    Long Term Goals:  To be accomplished in 8 weeks:  1) Pt will be able to read/study without increase of neck pain  2) Pt will be able to look over shoulders while driving without increase of neck pain  3) Pt will demonstrate ability to lift >/=  40 lbs without increase of symptoms  4) Pt will be able to sleep through the night without waking in pain  5) Pt will be able to rise from supine to sitting without head lag or pain. Bg Marin       PLAN  [x]  Upgrade activities as tolerated     [x]  Continue plan of care  [x]  Update interventions per flow sheet       []  Discharge due to:_  []  Other:_      Geraldine Molina PTA, CPT 9/4/2019

## 2019-09-05 ENCOUNTER — APPOINTMENT (OUTPATIENT)
Dept: PHYSICAL THERAPY | Age: 24
End: 2019-09-05
Payer: COMMERCIAL

## 2019-09-10 ENCOUNTER — HOSPITAL ENCOUNTER (OUTPATIENT)
Dept: PHYSICAL THERAPY | Age: 24
Discharge: HOME | End: 2019-09-10
Payer: COMMERCIAL

## 2019-09-10 PROCEDURE — 97110 THERAPEUTIC EXERCISES: CPT | Performed by: PHYSICAL THERAPY ASSISTANT

## 2019-09-10 PROCEDURE — 97140 MANUAL THERAPY 1/> REGIONS: CPT | Performed by: PHYSICAL THERAPY ASSISTANT

## 2019-09-10 NOTE — PROGRESS NOTES
PT DAILY TREATMENT NOTE 2-15    Patient Name: Nick Villa  Date:9/10/2019  : 1995  [x]  Patient  Verified  Payor: Jose Fernandes / Plan: Lesley Jones / Product Type: PPO /    In time:12:35 pm Out time:1:35 pm  Total Treatment Time (min): 60  Visit #:  4    Treatment Area: Neck pain [M54.2]    SUBJECTIVE  Pain Level (0-10 scale): 4  Any medication changes, allergies to medications, adverse drug reactions, diagnosis change, or new procedure performed?: [x] No    [] Yes (see summary sheet for update)  Subjective functional status/changes:   [] No changes reported  Patient report she has some tension medially in her neck today and still experiencing some numbness in her R hand at random times.     OBJECTIVE    Modality rationale: decrease pain and increase tissue extensibility to improve the patients ability to lift, smith,y reach and complete ADL's   Min Type Additional Details       [] Estim: []Att   []Unatt    []TENS instruct                  []IFC  []Premod   []NMES                     []Other:  []w/US   []w/ice   []w/heat  Position:  Location:       []  Traction: [] Cervical       []Lumbar                       [] Prone          []Supine                       []Intermittent   []Continuous Lbs:  [] before manual  [] after manual  []w/heat    []  Ultrasound: []Continuous   [] Pulsed                       at: []1MHz   []3MHz Location:  W/cm2:    [] Paraffin         Location:   []w/heat   10 []  Ice     [x]  Heat  []  Ice massage Position:supine  Location:neck    []  Laser  []  Other: Position:  Location:      []  Vasopneumatic Device Pressure:       [] lo [] med [] hi   Temperature:      [x] Skin assessment post-treatment:  [x]intact []redness- no adverse reaction    []redness - adverse reaction:         35 min Therapeutic Exercise:  [x] See flow sheet :   Rationale: increase ROM, increase strength, improve coordination, improve balance and increase proprioception to improve the patients ability to lift, carry, reach and complete ADL's    15 min Manual Therapy:  Cervical traction, SOR, UT stretch, P/A mobs to cervical spine gd III/IV   Rationale: decrease pain, increase ROM, increase tissue extensibility and decrease trigger points  to improve the patients ability to lift, carry, reach and complete ADL's     With   [] TE   [] TA   [] neuro   [] other: Patient Education: [x] Review HEP    [] Progressed/Changed HEP based on:   [] positioning   [] body mechanics   [] transfers   [] heat/ice application    [] other:      Other Objective/Functional Measures:     strength on L: 63 lbs; on R: 51 lbs    Pain Level (0-10 scale) post treatment: 0    ASSESSMENT/Changes in Function:   Pt tolerated there-ex well and now demonstrates pain relief w/ manual techniques. Patient will continue to benefit from skilled PT services to modify and progress therapeutic interventions, address functional mobility deficits, address ROM deficits, address strength deficits, analyze and address soft tissue restrictions, analyze and cue movement patterns, analyze and modify body mechanics/ergonomics, assess and modify postural abnormalities and address imbalance/dizziness to attain remaining goals.      []  See Plan of Care  []  See progress note/recertification  []  See Discharge Summary         Progress towards goals / Updated goals:  NT      PLAN  [x]  Upgrade activities as tolerated     [x]  Continue plan of care  [x]  Update interventions per flow sheet       []  Discharge due to:_  []  Other:_      Fabiana Granger, PT, DPT, OCS 9/10/2019

## 2019-09-13 ENCOUNTER — HOSPITAL ENCOUNTER (OUTPATIENT)
Dept: PHYSICAL THERAPY | Age: 24
Discharge: HOME | End: 2019-09-13
Payer: COMMERCIAL

## 2019-09-13 PROCEDURE — 97110 THERAPEUTIC EXERCISES: CPT | Performed by: PHYSICAL THERAPY ASSISTANT

## 2019-09-13 PROCEDURE — 97140 MANUAL THERAPY 1/> REGIONS: CPT | Performed by: PHYSICAL THERAPY ASSISTANT

## 2019-09-13 NOTE — PROGRESS NOTES
PT DAILY TREATMENT NOTE 2-15    Patient Name: Lela Walker  Date:2019  : 1995  [x]  Patient  Verified  Payor: Gwendolyn Rebollar / Plan: Diandra Carroll / Product Type: PPO /    In time:7:35 am Out time:8:15 am  Total Treatment Time (min): 40  Visit #:  5    Treatment Area: Neck pain [M54.2]    SUBJECTIVE  Pain Level (0-10 scale): 3  Any medication changes, allergies to medications, adverse drug reactions, diagnosis change, or new procedure performed?: [x] No    [] Yes (see summary sheet for update)  Subjective functional status/changes:   [] No changes reported  Patient report she experienced a headache after last session but noticed more neck ROM the next day.     OBJECTIVE    Modality rationale: decrease pain and increase tissue extensibility to improve the patients ability to lift, smith,y reach and complete ADL's   Min Type Additional Details       [] Estim: []Att   []Unatt    []TENS instruct                  []IFC  []Premod   []NMES                     []Other:  []w/US   []w/ice   []w/heat  Position:  Location:       []  Traction: [] Cervical       []Lumbar                       [] Prone          []Supine                       []Intermittent   []Continuous Lbs:  [] before manual  [] after manual  []w/heat    []  Ultrasound: []Continuous   [] Pulsed                       at: []1MHz   []3MHz Location:  W/cm2:    [] Paraffin         Location:   []w/heat   NT []  Ice     []  Heat  []  Ice massage Position:supine  Location:neck    []  Laser  []  Other: Position:  Location:      []  Vasopneumatic Device Pressure:       [] lo [] med [] hi   Temperature:      [x] Skin assessment post-treatment:  [x]intact []redness- no adverse reaction    []redness - adverse reaction:         30 min Therapeutic Exercise:  [x] See flow sheet :   Rationale: increase ROM, increase strength, improve coordination, improve balance and increase proprioception to improve the patients ability to lift, carry, reach and complete ADL's    10 min Manual Therapy:  Cervical traction, SOR, UT stretch, P/A mobs to cervical spine gd III/IV   Rationale: decrease pain, increase ROM, increase tissue extensibility and decrease trigger points  to improve the patients ability to lift, carry, reach and complete ADL's     With   [] TE   [] TA   [] neuro   [] other: Patient Education: [x] Review HEP    [] Progressed/Changed HEP based on:   [] positioning   [] body mechanics   [] transfers   [] heat/ice application    [] other:      Other Objective/Functional Measures:    NT    Pain Level (0-10 scale) post treatment: 0    ASSESSMENT/Changes in Function:   Pt needed to leave session early due to school. Pt tolerated there-ex ok but felt mild pain w/ bilateral shoulder ER w/ tband. Patient will continue to benefit from skilled PT services to modify and progress therapeutic interventions, address functional mobility deficits, address ROM deficits, address strength deficits, analyze and address soft tissue restrictions, analyze and cue movement patterns, analyze and modify body mechanics/ergonomics, assess and modify postural abnormalities and address imbalance/dizziness to attain remaining goals.      []  See Plan of Care  []  See progress note/recertification  []  See Discharge Summary         Progress towards goals / Updated goals:  NT      PLAN  [x]  Upgrade activities as tolerated     [x]  Continue plan of care  [x]  Update interventions per flow sheet       []  Discharge due to:_  []  Other:_      Josseline Armenta, PT, DPT, OCS 9/13/2019

## 2019-09-18 ENCOUNTER — HOSPITAL ENCOUNTER (OUTPATIENT)
Dept: PHYSICAL THERAPY | Age: 24
Discharge: HOME | End: 2019-09-18
Payer: COMMERCIAL

## 2019-09-18 PROCEDURE — 97110 THERAPEUTIC EXERCISES: CPT | Performed by: PHYSICAL MEDICINE & REHABILITATION

## 2019-09-18 NOTE — PROGRESS NOTES
PT DAILY TREATMENT NOTE 2-15    Patient Name: Lev Love  OKEQ:[id number]  : 1995  [x]  Patient  Verified  Payor: Libia Jesus / Plan: Malka Dee / Product Type: PPO /    In time:430 pm Out time:530 pm  Total Treatment Time (min): 60  Visit #:  6    Treatment Area: Neck pain [M54.2]    SUBJECTIVE  Pain Level (0-10 scale): 0  Any medication changes, allergies to medications, adverse drug reactions, diagnosis change, or new procedure performed?: [x] No    [] Yes (see summary sheet for update)  Subjective functional status/changes:   [] No changes reported  Patient report she had a lot of pain after last visit and had some pain yesterday. Patient stated she had a HA this morning but it's gone now.     OBJECTIVE    Modality rationale: decrease pain and increase tissue extensibility to improve the patients ability to lift, smith,y reach and complete ADL's   Min Type Additional Details       [] Estim: []Att   []Unatt    []TENS instruct                  []IFC  []Premod   []NMES                     []Other:  []w/US   []w/ice   []w/heat  Position:  Location:       []  Traction: [] Cervical       []Lumbar                       [] Prone          []Supine                       []Intermittent   []Continuous Lbs:  [] before manual  [] after manual  []w/heat    []  Ultrasound: []Continuous   [] Pulsed                       at: []1MHz   []3MHz Location:  W/cm2:    [] Paraffin         Location:   []w/heat   10 []  Ice     [x]  Heat  []  Ice massage Position:90/90  Location:back and chest    []  Laser  []  Other: Position:  Location:      []  Vasopneumatic Device Pressure:       [] lo [] med [] hi   Temperature:      [x] Skin assessment post-treatment:  [x]intact []redness- no adverse reaction    []redness - adverse reaction:         50 min Therapeutic Exercise:  [x] See flow sheet :   Rationale: increase ROM, increase strength, improve coordination, improve balance and increase proprioception to improve the patients ability to lift, carry, reach and complete ADL's     With   [] TE   [] TA   [] neuro   [] other: Patient Education: [x] Review HEP    [] Progressed/Changed HEP based on:   [] positioning   [] body mechanics   [] transfers   [] heat/ice application    [] other:      Other Objective/Functional Measures:    Good tolerance to PHUC exercises with significant tightness present paraspinals. Pain Level (0-10 scale) post treatment: 0    ASSESSMENT/Changes in Function:     Patient will continue to benefit from skilled PT services to modify and progress therapeutic interventions, address functional mobility deficits, address ROM deficits, address strength deficits, analyze and address soft tissue restrictions, analyze and cue movement patterns, analyze and modify body mechanics/ergonomics, assess and modify postural abnormalities and address imbalance/dizziness to attain remaining goals. []  See Plan of Care  []  See progress note/recertification  []  See Discharge Summary         Progress towards goals / Updated goals:  Updated HEP to include PHUC exercises.       PLAN  [x]  Upgrade activities as tolerated     [x]  Continue plan of care  [x]  Update interventions per flow sheet       []  Discharge due to:_  []  Other:_      Ant Le PTA, CPT 9/18/2019

## 2019-09-20 ENCOUNTER — HOSPITAL ENCOUNTER (OUTPATIENT)
Dept: PHYSICAL THERAPY | Age: 24
Discharge: HOME | End: 2019-09-20
Payer: COMMERCIAL

## 2019-09-20 PROCEDURE — 97110 THERAPEUTIC EXERCISES: CPT

## 2019-09-20 NOTE — PROGRESS NOTES
PT DAILY TREATMENT NOTE 2-15    Patient Name: Ammon Harvey  Date:2019  : 1995  [x]  Patient  Verified  Payor: Monty Raines / Plan: Geronimo Bell / Product Type: PPO /    In time:8:00a Out time:8:40a  Total Treatment Time (min): 40  Visit #:  7    Treatment Area: Neck pain [M54.2]    SUBJECTIVE  Pain Level (0-10 scale): 0  Any medication changes, allergies to medications, adverse drug reactions, diagnosis change, or new procedure performed?: [x] No    [] Yes (see summary sheet for update)  Subjective functional status/changes:   [] No changes reported  Patient reports she has been doing good since last session and only has minimal pain when looking down at school for long periods.     OBJECTIVE    Modality rationale: decrease pain and increase tissue extensibility to improve the patients ability to lift, smith,y reach and complete ADL's   Min Type Additional Details       [] Estim: []Att   []Unatt    []TENS instruct                  []IFC  []Premod   []NMES                     []Other:  []w/US   []w/ice   []w/heat  Position:  Location:       []  Traction: [] Cervical       []Lumbar                       [] Prone          []Supine                       []Intermittent   []Continuous Lbs:  [] before manual  [] after manual  []w/heat    []  Ultrasound: []Continuous   [] Pulsed                       at: []1MHz   []3MHz Location:  W/cm2:    [] Paraffin         Location:   []w/heat   NT []  Ice     [x]  Heat  []  Ice massage Position:90/90  Location:back and chest    []  Laser  []  Other: Position:  Location:      []  Vasopneumatic Device Pressure:       [] lo [] med [] hi   Temperature:      [x] Skin assessment post-treatment:  [x]intact []redness- no adverse reaction    []redness - adverse reaction:         40 min Therapeutic Exercise:  [x] See flow sheet :   Rationale: increase ROM, increase strength, improve coordination, improve balance and increase proprioception to improve the patients ability to lift, carry, reach and complete ADL's     With   [] TE   [] TA   [] neuro   [] other: Patient Education: [x] Review HEP    [] Progressed/Changed HEP based on:   [] positioning   [] body mechanics   [] transfers   [] heat/ice application    [] other:      Other Objective/Functional Measures:    Good tolerance to PHUC exercises with significant tightness present paraspinals. Pain Level (0-10 scale) post treatment: 0    ASSESSMENT/Changes in Function:     Patient will continue to benefit from skilled PT services to modify and progress therapeutic interventions, address functional mobility deficits, address ROM deficits, address strength deficits, analyze and address soft tissue restrictions, analyze and cue movement patterns, analyze and modify body mechanics/ergonomics, assess and modify postural abnormalities and address imbalance/dizziness to attain remaining goals. []  See Plan of Care  []  See progress note/recertification  []  See Discharge Summary         Progress towards goals / Updated goals:  Patient demonstrates good tolerance for interventions with mod cues required for breath coordination and proper muscle activation.       PLAN  [x]  Upgrade activities as tolerated     [x]  Continue plan of care  [x]  Update interventions per flow sheet       []  Discharge due to:_  []  Other:_      Beckie Donis, PTA 9/20/2019

## 2019-09-27 ENCOUNTER — HOSPITAL ENCOUNTER (OUTPATIENT)
Dept: PHYSICAL THERAPY | Age: 24
Discharge: HOME | End: 2019-09-27
Payer: COMMERCIAL

## 2019-09-27 PROCEDURE — 97110 THERAPEUTIC EXERCISES: CPT

## 2019-09-27 NOTE — PROGRESS NOTES
PT DAILY TREATMENT NOTE 2-15    Patient Name: Basim Robert  Date:2019  : 1995  [x]  Patient  Verified  Payor: Rubens Agosto / Plan: Dante Horn / Product Type: PPO /    In [address]  Total Treatment Time (min): 45  Visit #:  8    Treatment Area: Neck pain [M54.2]    SUBJECTIVE  Pain Level (0-10 scale): 0  Any medication changes, allergies to medications, adverse drug reactions, diagnosis change, or new procedure performed?: [x] No    [] Yes (see summary sheet for update)  Subjective functional status/changes:   [] No changes reported  Patient reports she has not been having as much pain at school and when she does feel pain it goes away quickly.     OBJECTIVE    Modality rationale: decrease pain and increase tissue extensibility to improve the patients ability to lift, smith,y reach and complete ADL's   Min Type Additional Details       [] Estim: []Att   []Unatt    []TENS instruct                  []IFC  []Premod   []NMES                     []Other:  []w/US   []w/ice   []w/heat  Position:  Location:       []  Traction: [] Cervical       []Lumbar                       [] Prone          []Supine                       []Intermittent   []Continuous Lbs:  [] before manual  [] after manual  []w/heat    []  Ultrasound: []Continuous   [] Pulsed                       at: []1MHz   []3MHz Location:  W/cm2:    [] Paraffin         Location:   []w/heat   NT []  Ice     [x]  Heat  []  Ice massage Position:90/90  Location:back and chest    []  Laser  []  Other: Position:  Location:      []  Vasopneumatic Device Pressure:       [] lo [] med [] hi   Temperature:      [x] Skin assessment post-treatment:  [x]intact []redness- no adverse reaction    []redness - adverse reaction:         45 min Therapeutic Exercise:  [x] See flow sheet :   Rationale: increase ROM, increase strength, improve coordination, improve balance and increase proprioception to improve the patients ability to lift, carry, reach and complete ADL's     With   [] TE   [] TA   [] neuro   [] other: Patient Education: [x] Review HEP    [] Progressed/Changed HEP based on:   [] positioning   [] body mechanics   [] transfers   [] heat/ice application    [] other:      Other Objective/Functional Measures:    Good tolerance to PHUC exercises with significant tightness present paraspinals. UPPER QUARTER MUSCLE STRENGTH      R  L  C1, C2 Neck Flex   5/5     C3 Side Flex   5  5   C4 Sh Elev   4+, p!  5   C5 Deltoid/Biceps  5  5   C6 Wrist Ext   5  5   C7 Triceps   5  5   C8 Thumb Ext   5  5   T1 Hand Inst   5  5    AROM CErvical:  Rotation:    75   75  SB:     40   38   Extension:     45  Flexion:     50        Strength: R 56.6, L 45.6    Pain Level (0-10 scale) post treatment: 0    ASSESSMENT/Changes in Function:     Patient will continue to benefit from skilled PT services to modify and progress therapeutic interventions, address functional mobility deficits, address ROM deficits, address strength deficits, analyze and address soft tissue restrictions, analyze and cue movement patterns, analyze and modify body mechanics/ergonomics, assess and modify postural abnormalities and address imbalance/dizziness to attain remaining goals. []  See Plan of Care  []  See progress note/recertification  []  See Discharge Summary         Progress towards goals / Updated goals:  Patient has met all short term goals and is making good progress towards long term goals. Patient will do well with continued PT to decreased episodes of pain, improve strength, ADL's and ability to sit in class without increased pain. Patient to schedule for 3 weeks to reach long term goals. Short Term Goals: To be accomplished in 4 weeks:  1) Pt will be independent with HEP Met  2) Pt will be able to sit with upright posture >/= 5 minutes without increase of symptoms Met  3) Pt will report symmetrical  cervical Rotation to look over shoulders while driving. Met  4) Pt report  Decrease in headaches by 50%. Met     Long Term Goals: To be accomplished in 8 weeks:  1) Pt will be able to read/study without increase of neck pain  2) Pt will be able to look over shoulders while driving without increase of neck pain Met  3) Pt will demonstrate ability to lift >/=  40 lbs without increase of symptoms  4) Pt will be able to sleep through the night without waking in pain Progressing  5) Pt will be able to rise from supine to sitting without head lag or pain. Met  .       PLAN  [x]  Upgrade activities as tolerated     [x]  Continue plan of care  [x]  Update interventions per flow sheet       []  Discharge due to:_  []  Other:_      Ulises Kulkarni, VERITO 9/27/2019

## 2019-12-31 NOTE — PROGRESS NOTES
[address] [place] [address], [address]  Phone: [phone]  Fax: [phone]    Discharge Summary  2-15    Patient name: aRul Vega  : 1995  Provider#: [id number]  Referral source: Abena Amin MD      Medical/Treatment Diagnosis: Neck pain [M54.2]     Prior Hospitalization: see medical history     Comorbidities: See Plan of Care  Prior Level of Function:See Plan of Care  Medications: Verified on Patient Summary List    Start of Care: 19      Onset Date:19   Visits from Start of Care: 8     Missed Visits: 0  Reporting Period : 19 to 19      Objective/Functional Measures:             UPPER QUARTER MUSCLE STRENGTH                                                  R                      L  C1, C2 Neck Flex                                5/5                                 C3 Side Flex                           5                      5            C4 Sh Elev                              4+, p!               5            C5 Deltoid/Biceps                   5                      5            C6 Wrist Ext                            5                      5            C7 Triceps                               5                      5            C8 Thumb Ext                         5                      5            T1 Hand Inst                           5                      5     AROM CErvical:  Rotation:                                  75                    75  SB:                                          40                    38   Extension:                                           45  Flexion:                                               50                                      Strength: R 56.6, L 45.6     Pain Level (0-10 scale) post treatment: 0            Progress towards goals / Updated goals:  Patient has met all short term goals and is making good progress towards long term goals.  Pt stopped attending PT due to school. Short Term Goals: To be accomplished in 4 weeks:  1) Pt will be independent with HEP Met  2) Pt will be able to sit with upright posture >/= 5 minutes without increase of symptoms Met  3) Pt will report symmetrical  cervical Rotation to look over shoulders while driving. Met  4) Pt report  Decrease in headaches by 50%. Met     Long Term Goals: To be accomplished in 8 weeks:  1) Pt will be able to read/study without increase of neck pain  2) Pt will be able to look over shoulders while driving without increase of neck pain Met  3) Pt will demonstrate ability to lift >/=  40 lbs without increase of symptoms  4) Pt will be able to sleep through the night without waking in pain Progressing  5) Pt will be able to rise from supine to sitting without head lag or pain. Met  .         RECOMMENDATIONS:  [x]Discontinue therapy: [x]Patient has reached or is progressing toward set goals      []Patient is non-compliant or has abdicated      []Due to lack of appreciable progress towards set goals      []Other    [place] Ace, PT, DPT, OCS 12/31/2019

## 2020-04-14 ENCOUNTER — HOSPITAL ENCOUNTER (OUTPATIENT)
Dept: GENERAL RADIOLOGY | Age: 25
Discharge: HOME | End: 2020-04-14
Payer: COMMERCIAL

## 2020-04-14 DIAGNOSIS — R10.9 ABDOMINAL PAIN: ICD-10-CM

## 2020-04-14 PROCEDURE — 74018 RADEX ABDOMEN 1 VIEW: CPT

## 2020-08-22 NOTE — PROGRESS NOTES
Katherine Mcneill is a 26 y.o. female    Fasting   Chief Complaint   Patient presents with    Form Completion     School forms to be completed    PPD Placement       1. Have you been to the ER, urgent care clinic since your last visit? Hospitalized since your last visit? No  M  2. Have you seen or consulted any other health care providers outside of the [address] [place] [address] since your last visit? Include any pap smears or colon screening. No      Visit Vitals  /83 (BP 1 Location: Left arm, BP Patient Position: Sitting)   Pulse 75   Temp 98.2 °F (36.8 °C) (Oral)   Resp 18   Ht 5' 6.34\" (1.685 m)   Wt 183 lb 6.4 oz (83.2 kg)   SpO2 98%   BMI 29.30 kg/m²           Health Maintenance Due   Topic Date Due    HPV Age 9Y-34Y (2 - Female 3-dose series) 09/26/2017    DTaP/Tdap/Td series (1 - Tdap) 06/04/2018         Medication Reconciliation completed, changes noted.   Please  Update medication list. 102.7

## 2020-08-29 ENCOUNTER — APPOINTMENT (OUTPATIENT)
Dept: CT IMAGING | Age: 25
End: 2020-08-29
Attending: PHYSICIAN ASSISTANT
Payer: COMMERCIAL

## 2020-08-29 ENCOUNTER — HOSPITAL ENCOUNTER (EMERGENCY)
Age: 25
Discharge: HOME | End: 2020-08-29
Attending: EMERGENCY MEDICINE | Admitting: EMERGENCY MEDICINE
Payer: COMMERCIAL

## 2020-08-29 ENCOUNTER — TELEPHONE (OUTPATIENT)
Dept: FAMILY MEDICINE CLINIC | Age: 25
End: 2020-08-29

## 2020-08-29 ENCOUNTER — APPOINTMENT (OUTPATIENT)
Dept: VASCULAR SURGERY | Age: 25
End: 2020-08-29
Attending: PHYSICIAN ASSISTANT
Payer: COMMERCIAL

## 2020-08-29 VITALS
SYSTOLIC BLOOD PRESSURE: 137 MMHG | TEMPERATURE: 97.7 F | RESPIRATION RATE: 16 BRPM | OXYGEN SATURATION: 100 % | DIASTOLIC BLOOD PRESSURE: 90 MMHG | HEART RATE: 89 BPM

## 2020-08-29 DIAGNOSIS — N30.00 ACUTE CYSTITIS WITHOUT HEMATURIA: ICD-10-CM

## 2020-08-29 DIAGNOSIS — M79.605 BILATERAL LEG PAIN: ICD-10-CM

## 2020-08-29 DIAGNOSIS — M79.604 BILATERAL LEG PAIN: ICD-10-CM

## 2020-08-29 DIAGNOSIS — R06.02 SOB (SHORTNESS OF BREATH): Primary | ICD-10-CM

## 2020-08-29 DIAGNOSIS — F41.1 ANXIETY STATE: ICD-10-CM

## 2020-08-29 LAB
ALBUMIN SERPL-MCNC: 3.8 G/DL (ref 3.5–5)
ALBUMIN/GLOB SERPL: 0.7 {RATIO} (ref 1.1–2.2)
ALP SERPL-CCNC: 89 U/L (ref 45–117)
ALT SERPL-CCNC: 56 U/L (ref 12–78)
ANION GAP SERPL CALC-SCNC: 7 MMOL/L (ref 5–15)
APPEARANCE UR: CLEAR
AST SERPL-CCNC: 30 U/L (ref 15–37)
BACTERIA URNS QL MICRO: ABNORMAL /HPF
BASOPHILS # BLD: 0 K/UL (ref 0–0.1)
BASOPHILS NFR BLD: 0 % (ref 0–1)
BILIRUB SERPL-MCNC: 0.5 MG/DL (ref 0.2–1)
BILIRUB UR QL CFM: NEGATIVE
BNP SERPL-MCNC: 32 PG/ML
BUN SERPL-MCNC: 14 MG/DL (ref 6–20)
BUN/CREAT SERPL: 13 (ref 12–20)
CALCIUM SERPL-MCNC: 9.3 MG/DL (ref 8.5–10.1)
CHLORIDE SERPL-SCNC: 105 MMOL/L (ref 97–108)
CK SERPL-CCNC: 101 U/L (ref 26–192)
CO2 SERPL-SCNC: 23 MMOL/L (ref 21–32)
COLOR UR: ABNORMAL
COMMENT, HOLDF: NORMAL
CREAT SERPL-MCNC: 1.04 MG/DL (ref 0.55–1.02)
DIFFERENTIAL METHOD BLD: NORMAL
EOSINOPHIL # BLD: 0 K/UL (ref 0–0.4)
EOSINOPHIL NFR BLD: 0 % (ref 0–7)
EPITH CASTS URNS QL MICRO: ABNORMAL /LPF
ERYTHROCYTE [DISTWIDTH] IN BLOOD BY AUTOMATED COUNT: 13.1 % (ref 11.5–14.5)
GLOBULIN SER CALC-MCNC: 5.3 G/DL (ref 2–4)
GLUCOSE SERPL-MCNC: 130 MG/DL (ref 65–100)
GLUCOSE UR STRIP.AUTO-MCNC: NEGATIVE MG/DL
HCG UR QL: NEGATIVE
HCT VFR BLD AUTO: 43 % (ref 35–47)
HGB BLD-MCNC: 13.7 G/DL (ref 11.5–16)
HGB UR QL STRIP: NEGATIVE
IMM GRANULOCYTES # BLD AUTO: 0 K/UL (ref 0–0.04)
IMM GRANULOCYTES NFR BLD AUTO: 0 % (ref 0–0.5)
KETONES UR QL STRIP.AUTO: 40 MG/DL
LEUKOCYTE ESTERASE UR QL STRIP.AUTO: ABNORMAL
LYMPHOCYTES # BLD: 1.8 K/UL (ref 0.8–3.5)
LYMPHOCYTES NFR BLD: 23 % (ref 12–49)
MCH RBC QN AUTO: 27.5 PG (ref 26–34)
MCHC RBC AUTO-ENTMCNC: 31.9 G/DL (ref 30–36.5)
MCV RBC AUTO: 86.2 FL (ref 80–99)
MONOCYTES # BLD: 0.5 K/UL (ref 0–1)
MONOCYTES NFR BLD: 6 % (ref 5–13)
MUCOUS THREADS URNS QL MICRO: ABNORMAL /LPF
NEUTS SEG # BLD: 5.5 K/UL (ref 1.8–8)
NEUTS SEG NFR BLD: 71 % (ref 32–75)
NITRITE UR QL STRIP.AUTO: NEGATIVE
NRBC # BLD: 0 K/UL (ref 0–0.01)
NRBC BLD-RTO: 0 PER 100 WBC
PH UR STRIP: 6 [PH] (ref 5–8)
PLATELET # BLD AUTO: 342 K/UL (ref 150–400)
PMV BLD AUTO: 10.6 FL (ref 8.9–12.9)
POTASSIUM SERPL-SCNC: 3.7 MMOL/L (ref 3.5–5.1)
PROT SERPL-MCNC: 9.1 G/DL (ref 6.4–8.2)
PROT UR STRIP-MCNC: 100 MG/DL
RBC # BLD AUTO: 4.99 M/UL (ref 3.8–5.2)
RBC #/AREA URNS HPF: ABNORMAL /HPF (ref 0–5)
SAMPLES BEING HELD,HOLD: NORMAL
SODIUM SERPL-SCNC: 135 MMOL/L (ref 136–145)
SP GR UR REFRACTOMETRY: >1.03 (ref 1–1.03)
TROPONIN I SERPL-MCNC: <0.05 NG/ML
TSH SERPL DL<=0.05 MIU/L-ACNC: 1.02 UIU/ML (ref 0.36–3.74)
UR CULT HOLD, URHOLD: NORMAL
UROBILINOGEN UR QL STRIP.AUTO: 2 EU/DL (ref 0.2–1)
WBC # BLD AUTO: 7.8 K/UL (ref 3.6–11)
WBC URNS QL MICRO: ABNORMAL /HPF (ref 0–4)

## 2020-08-29 PROCEDURE — 82550 ASSAY OF CK (CPK): CPT

## 2020-08-29 PROCEDURE — 93005 ELECTROCARDIOGRAM TRACING: CPT

## 2020-08-29 PROCEDURE — 85025 COMPLETE CBC W/AUTO DIFF WBC: CPT

## 2020-08-29 PROCEDURE — 80053 COMPREHEN METABOLIC PANEL: CPT

## 2020-08-29 PROCEDURE — 36415 COLL VENOUS BLD VENIPUNCTURE: CPT

## 2020-08-29 PROCEDURE — 81025 URINE PREGNANCY TEST: CPT

## 2020-08-29 PROCEDURE — 96374 THER/PROPH/DIAG INJ IV PUSH: CPT

## 2020-08-29 PROCEDURE — 74011000258 HC RX REV CODE- 258: Performed by: RADIOLOGY

## 2020-08-29 PROCEDURE — 93970 EXTREMITY STUDY: CPT

## 2020-08-29 PROCEDURE — 74011000636 HC RX REV CODE- 636: Performed by: RADIOLOGY

## 2020-08-29 PROCEDURE — 74011250636 HC RX REV CODE- 250/636: Performed by: PHYSICIAN ASSISTANT

## 2020-08-29 PROCEDURE — 71275 CT ANGIOGRAPHY CHEST: CPT

## 2020-08-29 PROCEDURE — 84484 ASSAY OF TROPONIN QUANT: CPT

## 2020-08-29 PROCEDURE — 99284 EMERGENCY DEPT VISIT MOD MDM: CPT

## 2020-08-29 PROCEDURE — 84443 ASSAY THYROID STIM HORMONE: CPT

## 2020-08-29 PROCEDURE — 83880 ASSAY OF NATRIURETIC PEPTIDE: CPT

## 2020-08-29 PROCEDURE — 81001 URINALYSIS AUTO W/SCOPE: CPT

## 2020-08-29 RX ORDER — IBUPROFEN 600 MG/1
600 TABLET ORAL
Qty: 20 TAB | Refills: 0 | Status: SHIPPED | OUTPATIENT
Start: 2020-08-29

## 2020-08-29 RX ORDER — KETOROLAC TROMETHAMINE 30 MG/ML
30 INJECTION, SOLUTION INTRAMUSCULAR; INTRAVENOUS
Status: COMPLETED | OUTPATIENT
Start: 2020-08-29 | End: 2020-08-29

## 2020-08-29 RX ORDER — NITROFURANTOIN 25; 75 MG/1; MG/1
100 CAPSULE ORAL 2 TIMES DAILY
Qty: 6 CAP | Refills: 0 | Status: SHIPPED | OUTPATIENT
Start: 2020-08-29 | End: 2020-09-01

## 2020-08-29 RX ORDER — SODIUM CHLORIDE 0.9 % (FLUSH) 0.9 %
10 SYRINGE (ML) INJECTION
Status: COMPLETED | OUTPATIENT
Start: 2020-08-29 | End: 2020-08-29

## 2020-08-29 RX ADMIN — Medication 10 ML: at 19:24

## 2020-08-29 RX ADMIN — SODIUM CHLORIDE 100 ML: 900 INJECTION, SOLUTION INTRAVENOUS at 21:01

## 2020-08-29 RX ADMIN — KETOROLAC TROMETHAMINE 30 MG: 30 INJECTION, SOLUTION INTRAMUSCULAR at 21:55

## 2020-08-29 RX ADMIN — SODIUM CHLORIDE 1000 ML: 9 INJECTION, SOLUTION INTRAVENOUS at 22:01

## 2020-08-29 RX ADMIN — IOPAMIDOL 80 ML: 755 INJECTION, SOLUTION INTRAVENOUS at 19:24

## 2020-08-29 NOTE — ED TRIAGE NOTES
Patient presents from home with complaints of left leg pain for weeks and now she has pain in both of her legs now. Patient reports she is now having SOB. Patient reports a family history of blood clots.  Patient is on oral contraceptives

## 2020-08-29 NOTE — ED PROVIDER NOTES
Keyanna Thomas is a 23 y.o. female with PMH of asthma, insulin resistance, IBS, interstitial cystitis presents to emergency room ambulatory for evaluation of bilateral calf pain x 2 weeks and SOB the past 2-3 days. C/o L calf pain for 2 weeks, still present and then with intermittent R calf pain that is getting more uncomfortable, swelling. Feeling lightheaded with standing, calf pain improves with standing but has been feeling lightheaded with too much standing the past few days. Also c/o shortness of breath the past few days, attributed it to anxiety, states increased stress lately- states it is well managed with breathing exercises. States hx of asthma, lives on the 3rd floor of the apartment, states increasing SOB with exertion today. Today drove 35-40 minutes, states both calves were uncomfortable but R > L. Had some substernal chest pressure today around 5pm. Also feeling palpitations the past few days. Is exercising more lately. Is taking Loestrin. 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 Paternal grandmother and paternal grandmother's sister has had PEs. LMP- ~3 weeks ago    PCP: Marcella Ghosh MD    Surgical hx- see chart  Social hx- see chart    The patient has no other complaints at this time. Please note that this dictation was completed with Dragon, computer voice recognition software.  Quite often unanticipated grammatical, syntax, homophones, and other interpretive errors are inadvertently transcribed by the computer software.  Please disregard these errors.  Additionally, please excuse any errors that have escaped final proofreading.              Past Medical History:   Diagnosis Date    Acid reflux     Asthma     Bladder incontinence     Depression     Endocrine disease     isulin resistance    Headache(784.0)     IBS (irritable bowel syndrome)     Interstitial cystitis     Irregular menstrual cycle     Vertigo        Past Surgical History:   Procedure Laterality Date    HX COLONOSCOPY           Family History:   Problem Relation Age of Onset   [address] Arthritis-osteo Mother     Asthma Mother     Headache Mother         migraines    Anxiety Mother     Hypertension Maternal Grandmother     Hypertension Maternal Grandfather     Cancer Maternal Grandfather         prostate    Hypertension Paternal Grandmother     Diabetes Paternal Grandmother     Thyroid Disease Paternal Grandmother     Hypertension Paternal Grandfather     Diabetes Paternal Grandfather     Thyroid Disease Paternal Grandfather        Social History     Socioeconomic History    Marital status: SINGLE     Spouse name: Not on file    Number of children: Not on file    Years of education: Not on file    Highest education level: Not on file   Occupational History    Not on file   Social Needs    Financial resource strain: Not on file    Food insecurity     Worry: Not on file     Inability: Not on file    Transportation needs     Medical: Not on file     Non-medical: Not on file   Tobacco Use    Smoking status: Never Smoker    Smokeless tobacco: Never Used   Substance and Sexual Activity    Alcohol use: Yes     Comment: rarely    Drug use: No    Sexual activity: Never   Lifestyle    Physical activity     Days per week: Not on file     Minutes per session: Not on file    Stress: Not on file   Relationships    Social connections     Talks on phone: Not on file     Gets together: Not on file     Attends Sabianist service: Not on file     Active member of club or organization: Not on file     Attends meetings of clubs or organizations: Not on file     Relationship status: Not on file    Intimate partner violence     Fear of current or ex partner: Not on file     Emotionally abused: Not on file     Physically abused: Not on file     Forced sexual activity: Not on file   Other Topics Concern    Not on file   Social History Narrative    ** Merged History Encounter **         ** Merged History Encounter ** ALLERGIES: Latex; Latex; Banana; Banana; Kiwi; Kiwi; Nut flavor; Peanut; Strawberry; and Tree nuts    Review of Systems   Constitutional: Negative. Negative for activity change, chills, fatigue and unexpected weight change. HENT: Negative for trouble swallowing. Respiratory: Positive for shortness of breath. Negative for cough, chest tightness and wheezing. Cardiovascular: Positive for chest pain. Negative for palpitations. Gastrointestinal: Negative. Negative for abdominal pain, diarrhea, nausea and vomiting. Genitourinary: Negative. Negative for dysuria, flank pain, frequency and hematuria. Musculoskeletal: Negative. Negative for arthralgias, back pain, neck pain and neck stiffness. Skin: Negative. Negative for color change and rash. Neurological: Positive for light-headedness (with prolonged standing). Negative for dizziness, numbness and headaches. All other systems reviewed and are negative. Vitals:    08/29/20 1858   BP: (!) 131/94   Pulse: (!) 126   Resp: 16   Temp: 97.7 °F (36.5 °C)   SpO2: 92%            Physical Exam  Vitals signs and nursing note reviewed. Constitutional:       General: She is not in acute distress. Appearance: She is well-developed. She is not toxic-appearing or diaphoretic. Comments: AA female   HENT:      Head: Normocephalic and atraumatic. Eyes:      General:         Right eye: No discharge. Left eye: No discharge. Conjunctiva/sclera: Conjunctivae normal.      Pupils: Pupils are equal, round, and reactive to light. Neck:      Musculoskeletal: Full passive range of motion without pain and normal range of motion. Trachea: No tracheal tenderness. Cardiovascular:      Rate and Rhythm: Normal rate and regular rhythm. Pulses: Normal pulses. Heart sounds: Normal heart sounds. No murmur. No friction rub. No gallop. Pulmonary:      Effort: Pulmonary effort is normal. No respiratory distress.       Breath sounds: Normal breath sounds. No wheezing or rales. Chest:      Chest wall: No tenderness. Abdominal:      General: Bowel sounds are normal. There is no distension. Palpations: Abdomen is soft. Tenderness: There is no abdominal tenderness. There is no guarding or rebound. Musculoskeletal: Normal range of motion. General: No tenderness. Skin:     General: Skin is warm and dry. Capillary Refill: Capillary refill takes less than 2 seconds. Findings: No abrasion, erythema or rash. Neurological:      Mental Status: She is alert and oriented to person, place, and time. Cranial Nerves: No cranial nerve deficit. Sensory: No sensory deficit. Coordination: Coordination normal.   Psychiatric:         Speech: Speech normal.         Behavior: Behavior normal.          MDM  Number of Diagnoses or Management Options  Diagnosis management comments:   Ddx: PE, PTX, PNA, COVID, DVT, arrhythmia, asthma       Amount and/or Complexity of Data Reviewed  Clinical lab tests: ordered and reviewed  Tests in the radiology section of CPT®: ordered and reviewed  Review and summarize past medical records: yes  Discuss the patient with other providers: yes  Independent visualization of images, tracings, or specimens: yes    Patient Progress  Patient progress: stable         Procedures    EKG interpretation:   Rhythm: sinus tachycardia; and regular . Rate (approx.): 104; Axis: normal; P wave: normal; QRS interval: normal ; ST/T wave: normal; Other findings: borderline ekg. 10:27 PM  Patient is been reassessed and feeling much better. Tachycardia has resolved prior to IV fluids being given. She is not hypoxic even after initial triage by me, O2 sat was 100%. She does admit to feeling anxious but states she feels that is controlled well enough and does not want medication at this time. She does agree to follow-up with her therapist this week.   She denies SI or HI.  COVID testing as I have no other explanation at this time for her shortness of breath other than anxiety, and she states she does not want to get tested for COVID-19. She states she will self quarantine and watch for any other signs and symptoms that may develop. No abnormality seen on her CT scan, her O2 sat is 100% on room air. We will ambulate and monitor O2 sat/heart rate and if able to tolerate will d/c with PCP f/u. LABORATORY TESTS:  Recent Results (from the past 12 hour(s))   EKG, 12 LEAD, INITIAL    Collection Time: 08/29/20  7:30 PM   Result Value Ref Range    Ventricular Rate 104 BPM    Atrial Rate 104 BPM    P-R Interval 144 ms    QRS Duration 66 ms    Q-T Interval 326 ms    QTC Calculation (Bezet) 428 ms    Calculated P Axis 80 degrees    Calculated R Axis 71 degrees    Calculated T Axis 40 degrees    Diagnosis       Sinus tachycardia  Biatrial enlargement  No previous ECGs available     SAMPLES BEING HELD    Collection Time: 08/29/20  7:36 PM   Result Value Ref Range    SAMPLES BEING HELD 1BLUE     COMMENT        Add-on orders for these samples will be processed based on acceptable specimen integrity and analyte stability, which may vary by analyte. URINALYSIS W/MICROSCOPIC    Collection Time: 08/29/20  7:36 PM   Result Value Ref Range    Color YELLOW/STRAW      Appearance CLEAR CLEAR      Specific gravity >1.030 (H) 1.003 - 1.030    pH (UA) 6.0 5.0 - 8.0      Protein 100 (A) NEG mg/dL    Glucose Negative NEG mg/dL    Ketone 40 (A) NEG mg/dL    Blood Negative NEG      Urobilinogen 2.0 (H) 0.2 - 1.0 EU/dL    Nitrites Negative NEG      Leukocyte Esterase MODERATE (A) NEG      WBC 10-20 0 - 4 /hpf    RBC 0-5 0 - 5 /hpf    Epithelial cells MODERATE (A) FEW /lpf    Bacteria 2+ (A) NEG /hpf    Mucus 1+ (A) NEG /lpf   URINE CULTURE HOLD SAMPLE    Collection Time: 08/29/20  7:36 PM    Specimen: Serum; Urine   Result Value Ref Range    Urine culture hold        Urine on hold in Microbiology dept for 2 days.   If unpreserved urine is submitted, it cannot be used for addtional testing after 24 hours, recollection will be required. CBC WITH AUTOMATED DIFF    Collection Time: 08/29/20  7:36 PM   Result Value Ref Range    WBC 7.8 3.6 - 11.0 K/uL    RBC 4.99 3.80 - 5.20 M/uL    HGB 13.7 11.5 - 16.0 g/dL    HCT 43.0 35.0 - 47.0 %    MCV 86.2 80.0 - 99.0 FL    MCH 27.5 26.0 - 34.0 PG    MCHC 31.9 30.0 - 36.5 g/dL    RDW 13.1 11.5 - 14.5 %    PLATELET 612 011 - 550 K/uL    MPV 10.6 8.9 - 12.9 FL    NRBC 0.0 0  WBC    ABSOLUTE NRBC 0.00 0.00 - 0.01 K/uL    NEUTROPHILS 71 32 - 75 %    LYMPHOCYTES 23 12 - 49 %    MONOCYTES 6 5 - 13 %    EOSINOPHILS 0 0 - 7 %    BASOPHILS 0 0 - 1 %    IMMATURE GRANULOCYTES 0 0.0 - 0.5 %    ABS. NEUTROPHILS 5.5 1.8 - 8.0 K/UL    ABS. LYMPHOCYTES 1.8 0.8 - 3.5 K/UL    ABS. MONOCYTES 0.5 0.0 - 1.0 K/UL    ABS. EOSINOPHILS 0.0 0.0 - 0.4 K/UL    ABS. BASOPHILS 0.0 0.0 - 0.1 K/UL    ABS. IMM. GRANS. 0.0 0.00 - 0.04 K/UL    DF AUTOMATED     METABOLIC PANEL, COMPREHENSIVE    Collection Time: 08/29/20  7:36 PM   Result Value Ref Range    Sodium 135 (L) 136 - 145 mmol/L    Potassium 3.7 3.5 - 5.1 mmol/L    Chloride 105 97 - 108 mmol/L    CO2 23 21 - 32 mmol/L    Anion gap 7 5 - 15 mmol/L    Glucose 130 (H) 65 - 100 mg/dL    BUN 14 6 - 20 MG/DL    Creatinine 1.04 (H) 0.55 - 1.02 MG/DL    BUN/Creatinine ratio 13 12 - 20      GFR est AA >60 >60 ml/min/1.73m2    GFR est non-AA >60 >60 ml/min/1.73m2    Calcium 9.3 8.5 - 10.1 MG/DL    Bilirubin, total 0.5 0.2 - 1.0 MG/DL    ALT (SGPT) 56 12 - 78 U/L    AST (SGOT) 30 15 - 37 U/L    Alk.  phosphatase 89 45 - 117 U/L    Protein, total 9.1 (H) 6.4 - 8.2 g/dL    Albumin 3.8 3.5 - 5.0 g/dL    Globulin 5.3 (H) 2.0 - 4.0 g/dL    A-G Ratio 0.7 (L) 1.1 - 2.2     NT-PRO BNP    Collection Time: 08/29/20  7:36 PM   Result Value Ref Range    NT pro-BNP 32 <125 PG/ML   TROPONIN I    Collection Time: 08/29/20  7:36 PM   Result Value Ref Range    Troponin-I, Qt. <0.05 <0.05 ng/mL   TSH 3RD GENERATION    Collection Time: 08/29/20  7:36 PM   Result Value Ref Range    TSH 1.02 0.36 - 3.74 uIU/mL   CK    Collection Time: 08/29/20  7:36 PM   Result Value Ref Range     26 - 192 U/L   BILIRUBIN, CONFIRM    Collection Time: 08/29/20  7:36 PM   Result Value Ref Range    Bilirubin UA, confirm Negative NEG     HCG URINE, QL. - POC    Collection Time: 08/29/20  7:41 PM   Result Value Ref Range    Pregnancy test,urine (POC) Negative NEG         IMAGING RESULTS:  Cta Chest W Or W Wo Cont    Result Date: 8/29/2020  IMPRESSION: 1. No evidence of pulmonary embolus. 2.  Clear lungs. MEDICATIONS GIVEN:  Medications   sodium chloride 0.9 % bolus infusion 1,000 mL (1,000 mL IntraVENous New Bag 8/29/20 2201)   sodium chloride 0.9 % bolus infusion 100 mL (100 mL IntraVENous New Bag 8/29/20 2101)   iopamidoL (ISOVUE-370) 76 % injection 100 mL (80 mL IntraVENous Given 8/29/20 1924)   sodium chloride (NS) flush 10 mL (10 mL IntraVENous Given 8/29/20 1924)   ketorolac (TORADOL) injection 30 mg (30 mg IntraVENous Given 8/29/20 2155)         DISCHARGE NOTE:  10:28 PM  The patient's results have been reviewed with them and/or available family. Patient and/or family verbally conveyed their understanding and agreement of the patient's signs, symptoms, diagnosis, treatment and prognosis and additionally agree to follow up as recommended in the discharge instructions or to return to the Emergency Room should their condition change prior to their follow-up appointment. The patient/family verbally agrees with the care-plan and verbally conveys that all of their questions have been answered. The discharge instructions have also been provided to the patient and/or family with some educational information regarding the patient's diagnosis as well a list of reasons why the patient would want to return to the ER prior to their follow-up appointment, should their condition change. Plan:  1.  F/U with pcp and therapist for anxiety control  2. Rx ibuprofen, macrobid  3.  Return precautions discussed and advised to return to ER if worse

## 2020-08-30 NOTE — DISCHARGE INSTRUCTIONS
Patient Education        Learning About Anxiety Disorders  What are anxiety disorders? Anxiety disorders are a type of medical problem. They cause severe anxiety. When you feel anxious, you feel that something bad is about to happen. This feeling interferes with your life. These disorders include:  · Generalized anxiety disorder. You feel worried and stressed about many everyday events and activities. This goes on for several months and disrupts your life on most days. · Panic disorder. You have repeated panic attacks. A panic attack is a sudden, intense fear or anxiety. It may make you feel short of breath. Your heart may pound. · Social anxiety disorder. You feel very anxious about what you will say or do in front of people. For example, you may be scared to talk or eat in public. This problem affects your daily life. · Phobias. You are very scared of a specific object, situation, or activity. For example, you may fear spiders, high places, or small spaces. What are the symptoms? Generalized anxiety disorder  Symptoms may include:  · Feeling worried and stressed about many things almost every day. · Feeling tired or irritable. You may have a hard time concentrating. · Having headaches or muscle aches. · Having a hard time getting to sleep or staying asleep. Panic disorder  You may have repeated panic attacks when there is no reason for feeling afraid. You may change your daily activities because you worry that you will have another attack. Symptoms may include:  · Intense fear, terror, or anxiety. · Trouble breathing or very fast breathing. · Chest pain or tightness. · A heartbeat that races or is not regular. Social anxiety disorder  Symptoms may include:  · Fear about a social situation, such as eating in front of others or speaking in public. You may worry a lot. Or you may be afraid that something bad will happen. · Anxiety that can cause you to blush, sweat, and feel shaky.   · A heartbeat that is faster than normal.  · A hard time focusing. Phobias  Symptoms may include:  · More fear than most people of being around an object, being in a situation, or doing an activity. You might also be stressed about the chance of being around the thing you fear. · Worry about losing control, panicking, fainting, or having physical symptoms like a faster heartbeat when you are around the situation or object. How are these disorders treated? Anxiety disorders can be treated with medicines or counseling. A combination of both may be used. Medicines may include:  · Antidepressants. These may help your symptoms by keeping chemicals in your brain in balance. · Benzodiazepines. These may give you short-term relief of your symptoms. Some people use cognitive-behavioral therapy. A therapist helps you learn to change stressful or bad thoughts into helpful thoughts. Lead a healthy lifestyle  A healthy lifestyle may help you feel better. · Get at least 30 minutes of exercise on most days of the week. Walking is a good choice. · Eat a healthy diet. Include fruits, vegetables, lean proteins, and whole grains in your diet each day. · Try to go to bed at the same time every night. Try for 8 hours of sleep a night. · Find ways to manage stress. Try relaxation exercises. · Avoid alcohol and illegal drugs. Follow-up care is a key part of your treatment and safety. Be sure to make and go to all appointments, and call your doctor if you are having problems. It's also a good idea to know your test results and keep a list of the medicines you take. Where can you learn more? Go to http://lyric-jamarcus.info/  Enter B185 in the search box to learn more about \"Learning About Anxiety Disorders. \"  Current as of: January 31, 2020               Content Version: 12.5  © 8996-4534 Healthwise, Inc[employer].    Care instructions adapted under license by [employer] (which disclaims liability or warranty for this information). If you have questions about a medical condition or this instruction, always ask your healthcare professional. [place] [address] any warranty or liability for your use of this information. Patient Education   Patient Education        Urinary Tract Infection in Women: Care Instructions  Your Care Instructions     A urinary tract infection, or UTI, is a general term for an infection anywhere between the kidneys and the urethra (where urine comes out). Most UTIs are bladder infections. They often cause pain or burning when you urinate. UTIs are caused by bacteria and can be cured with antibiotics. Be sure to complete your treatment so that the infection goes away. Follow-up care is a key part of your treatment and safety. Be sure to make and go to all appointments, and call your doctor if you are having problems. It's also a good idea to know your test results and keep a list of the medicines you take. How can you care for yourself at home? · Take your antibiotics as directed. Do not stop taking them just because you feel better. You need to take the full course of antibiotics. · Drink extra water and other fluids for the next day or two. This may help wash out the bacteria that are causing the infection. (If you have kidney, heart, or liver disease and have to limit fluids, talk with your doctor before you increase your fluid intake.)  · Avoid drinks that are carbonated or have caffeine. They can irritate the bladder. · Urinate often. Try to empty your bladder each time. · To relieve pain, take a hot bath or lay a heating pad set on low over your lower belly or genital area. Never go to sleep with a heating pad in place. To prevent UTIs  · Drink plenty of water each day. This helps you urinate often, which clears bacteria from your system.  (If you have kidney, heart, or liver disease and have to limit fluids, talk with your doctor before you increase your fluid intake.)  · Urinate when you need to. · Urinate right after you have sex. · Change sanitary pads often. · Avoid douches, bubble baths, feminine hygiene sprays, and other feminine hygiene products that have deodorants. · After going to the bathroom, wipe from front to back. When should you call for help? Call your doctor now or seek immediate medical care if:  · Symptoms such as fever, chills, nausea, or vomiting get worse or appear for the first time. · You have new pain in your back just below your rib cage. This is called flank pain. · There is new blood or pus in your urine. · You have any problems with your antibiotic medicine. Watch closely for changes in your health, and be sure to contact your doctor if:  · You are not getting better after taking an antibiotic for 2 days. · Your symptoms go away but then come back. Where can you learn more? Go to http://lyric-jamarcus.info/  Enter Y877 in the search box to learn more about \"Urinary Tract Infection in Women: Care Instructions. \"  Current as of: August 22, 2019               Content Version: 12.5  © 8889-8424 [employer]. Care instructions adapted under license by [id number] (which disclaims liability or warranty for this information). If you have questions about a medical condition or this instruction, always ask your healthcare professional. [address] any warranty or liability for your use of this information. Shortness of Breath: Care Instructions  Your Care Instructions  Shortness of breath has many causes. Sometimes conditions such as anxiety can lead to shortness of breath. Some people get mild shortness of breath when they exercise. Trouble breathing also can be a symptom of a serious problem, such as asthma, lung disease, emphysema, heart problems, and pneumonia. If your shortness of breath continues, you may need tests and treatment.  Watch for any changes in your breathing and other symptoms. Follow-up care is a key part of your treatment and safety. Be sure to make and go to all appointments, and call your doctor if you are having problems. It's also a good idea to know your test results and keep a list of the medicines you take. How can you care for yourself at home? · Do not smoke or allow others to smoke around you. If you need help quitting, talk to your doctor about stop-smoking programs and medicines. These can increase your chances of quitting for good. · Get plenty of rest and sleep. · Take your medicines exactly as prescribed. Call your doctor if you think you are having a problem with your medicine. · Find healthy ways to deal with stress. ? Exercise daily. ? Get plenty of sleep. ? Eat regularly and well. When should you call for help? [id number] anytime you think you may need emergency care. For example, call if:  · You have severe shortness of breath. · You have symptoms of a heart attack. These may include:  ? Chest pain or pressure, or a strange feeling in the chest.  ? Sweating. ? Shortness of breath. ? Nausea or vomiting. ? Pain, pressure, or a strange feeling in the back, neck, jaw, or upper belly or in one or both shoulders or arms. ? Lightheadedness or sudden weakness. ? A fast or irregular heartbeat. After you call 911, the  may tell you to chew 1 adult-strength or 2 to 4 low-dose aspirin. Wait for an ambulance. Do not try to drive yourself. Call your doctor now or seek immediate medical care if:  · Your shortness of breath gets worse or you start to wheeze. Wheezing is a high-pitched sound when you breathe. · You wake up at night out of breath or have to prop your head up on several pillows to breathe. · You are short of breath after only light activity or while at rest.  Watch closely for changes in your health, and be sure to contact your doctor if:  · You do not get better over the next 1 to 2 days.   Where can you learn more? Go to http://www.gray.com/  Enter S780 in the search box to learn more about \"Shortness of Breath: Care Instructions. \"  Current as of: February 24, 2020               Content Version: 12.5  © 4769-4450 Healthwise, Incorporated. Care instructions adapted under license by [employer] (which disclaims liability or warranty for this information). If you have questions about a medical condition or this instruction, always ask your healthcare professional. [address] any warranty or liability for your use of this information.

## 2020-09-01 LAB
ATRIAL RATE: 104 BPM
CALCULATED P AXIS, ECG09: 80 DEGREES
CALCULATED R AXIS, ECG10: 71 DEGREES
CALCULATED T AXIS, ECG11: 40 DEGREES
DIAGNOSIS, 93000: NORMAL
P-R INTERVAL, ECG05: 144 MS
Q-T INTERVAL, ECG07: 326 MS
QRS DURATION, ECG06: 66 MS
QTC CALCULATION (BEZET), ECG08: 428 MS
VENTRICULAR RATE, ECG03: 104 BPM

## 2020-09-03 ENCOUNTER — VIRTUAL VISIT (OUTPATIENT)
Dept: FAMILY MEDICINE CLINIC | Age: 25
End: 2020-09-03
Payer: COMMERCIAL

## 2020-09-03 DIAGNOSIS — M79.604 PAIN IN BOTH LOWER EXTREMITIES: ICD-10-CM

## 2020-09-03 DIAGNOSIS — N39.0 URINARY TRACT INFECTION WITHOUT HEMATURIA, SITE UNSPECIFIED: ICD-10-CM

## 2020-09-03 DIAGNOSIS — R79.89 LOW SERUM SODIUM: ICD-10-CM

## 2020-09-03 DIAGNOSIS — M79.605 PAIN IN BOTH LOWER EXTREMITIES: ICD-10-CM

## 2020-09-03 DIAGNOSIS — F41.9 ANXIETY: ICD-10-CM

## 2020-09-03 DIAGNOSIS — R59.0 LYMPHADENOPATHY, INGUINAL: Primary | ICD-10-CM

## 2020-09-03 DIAGNOSIS — E55.9 VITAMIN D DEFICIENCY: ICD-10-CM

## 2020-09-03 DIAGNOSIS — R20.2 PARESTHESIA: ICD-10-CM

## 2020-09-03 DIAGNOSIS — Z13.0 SCREENING FOR DEFICIENCY ANEMIA: ICD-10-CM

## 2020-09-03 PROCEDURE — 99213 OFFICE O/P EST LOW 20 MIN: CPT | Performed by: INTERNAL MEDICINE

## 2020-09-04 RX ORDER — LEVONORGESTREL AND ETHINYL ESTRADIOL 0.15-0.03
KIT ORAL
COMMUNITY
Start: 2020-02-01 | End: 2022-06-08 | Stop reason: SINTOL

## 2020-09-04 NOTE — PROGRESS NOTES
Chief Complaint   Patient presents with    Follow-up     ER visit over the weekend for SOB, leg pain and concerns for DVT. Work up negative.  Anxiety     Possible anxiety as part of symptoms. [facility] UTI     Being treated for UTI found as part of the work up.  Leg Pain     Needs to check CMP, Vitamin D and B12. Derick Dias is a 23 y.o. female who was seen by synchronous (real-time) audio-video technology on 9/3/2020 for Follow-up (ER visit over the weekend for SOB, leg pain and concerns for DVT. Work up negative. ); Anxiety (Possible anxiety as part of symptoms. ); UTI (Being treated for UTI found as part of the work up. ); and Leg Pain (Needs to check CMP, Vitamin D and B12. )        Assessment & Plan:   Diagnoses and all orders for this visit:    1. Lymphadenopathy, inguinal  -     US EXT NONVAS LT LTD; Future    2. Urinary tract infection without hematuria, site unspecified   Complete antibiotics   Drink plenty of water. 3. Pain in both lower extremities   Unclear etiology. 4. Anxiety   Stable. She is seeing a therapist.     5. Paresthesia  -     VITAMIN B12 & FOLATE; Future    6. Vitamin D deficiency  -     VITAMIN D, 25 HYDROXY; Future    7. Screening for deficiency anemia  -     VITAMIN B12 & FOLATE; Future    8. Low serum sodium  -     METABOLIC PANEL, COMPREHENSIVE; Future     I spent at least 19 minutes on this visit with this established patient. Subjective:   25F who presents for follow up after evaluation in the ER. She had noticed over the past 2-weeks worsening bilateral calf pain, mild SOB and discomfort. She reports that initially, the discomfort was noted with walking, but it is there with rest and she also noticed the SOB more in the last week. She denies any trama to her legs. She reports a family h/o \"CAD and vascular diseases. \"  She is not a smoker. She is on OCP. She was sent to the ER and the work up was negative for DVT/PE.  During the course of the work up, she was found to have a UTI, but she remained asymptomatic. She is on antibiotics and completing them. Continues to have no UTI symptoms. She continues to have the leg pain and I reviewed her studies. She was noted to have a L-groin lymph node that was 1.9 cm. She has not felt the swelling herself. In addition, she had some mild changes in her CMP (low sodium, elevated glucose and mildly elevated creatinine). Lab Results   Component Value Date/Time    Sodium 135 (L) 08/29/2020 07:36 PM    Potassium 3.7 08/29/2020 07:36 PM    Chloride 105 08/29/2020 07:36 PM    CO2 23 08/29/2020 07:36 PM    Anion gap 7 08/29/2020 07:36 PM    Glucose 130 (H) 08/29/2020 07:36 PM    BUN 14 08/29/2020 07:36 PM    Creatinine 1.04 (H) 08/29/2020 07:36 PM    BUN/Creatinine ratio 13 08/29/2020 07:36 PM    GFR est AA >60 08/29/2020 07:36 PM    GFR est non-AA >60 08/29/2020 07:36 PM    Calcium 9.3 08/29/2020 07:36 PM    Bilirubin, total 0.5 08/29/2020 07:36 PM    Alk. phosphatase 89 08/29/2020 07:36 PM    Protein, total 9.1 (H) 08/29/2020 07:36 PM    Albumin 3.8 08/29/2020 07:36 PM    Globulin 5.3 (H) 08/29/2020 07:36 PM    A-G Ratio 0.7 (L) 08/29/2020 07:36 PM    ALT (SGPT) 56 08/29/2020 07:36 PM    AST (SGOT) 30 08/29/2020 07:36 PM     Lab Results   Component Value Date/Time    WBC 7.8 08/29/2020 07:36 PM    HGB 13.7 08/29/2020 07:36 PM    HCT 43.0 08/29/2020 07:36 PM    PLATELET 217 48/38/5923 07:36 PM    MCV 86.2 08/29/2020 07:36 PM       CT Results (most recent):  Results from Hospital Encounter encounter on 08/29/20   CTA CHEST W OR W WO CONT    Narrative INDICATION:   tachycardia, chest pain, SOB, on estrogen     COMPARISON:  None    TECHNIQUE:  Following the uneventful intravenous administration of 100 cc Isovue  093, thin helical axial images were obtained through the chest. Postprocessing  was performed. 3D image postprocessing was performed.     CT dose reduction was achieved through the use of a standardized protocol  tailored for this examination and automatic exposure control for dose  modulation. FINDINGS:    There are no enlarged mediastinal or hilar lymph nodes. The heart size is  normal.  There is no pericardial effusion. No filling defect is seen within the pulmonary arterial system to suggest  pulmonary embolus. The aorta is normal in caliber. There is no focal air space disease. No pulmonary nodule or mass is seen. There  are no pleural effusions. Limited evaluation of the upper abdomen demonstrates no abnormality. The osseous  structures are unremarkable. Impression IMPRESSION:  1. No evidence of pulmonary embolus. 2.  Clear lungs. Problem List:  Patient Active Problem List    Diagnosis Date Noted    Mild depression ([facility] [address] 10/16/2018    GI problem 10/09/2018    Insulin resistance 02/27/2013    Acanthosis 06/21/2012       Medications:  Current Outpatient Medications   Medication Sig Dispense Refill    levonorgestrel-ethinyl estradiol (Lillow, 28,) 0.15-0.03 mg tab       ibuprofen (MOTRIN) 600 mg tablet Take 1 Tab by mouth every eight (8) hours as needed for Pain (take with food). 20 Tab 0    tiZANidine (ZANAFLEX) 2 mg tablet 1-2 tablets, 1-2 hours prior to bedtime. Indications: muscle spasm 30 Tab 0    SENNA by Does Not Apply route as needed.  LO LOESTRIN FE 1 mg-10 mcg (24)/10 mcg (2) tab TAKE 1 TABLET BY MOUTH EVERY DAY  0    dicyclomine (BENTYL) 20 mg tablet Take 1 Tab by mouth every six (6) hours as needed for up to 20 doses. 20 Tab 0    ondansetron (ZOFRAN ODT) 4 mg disintegrating tablet Take 1 Tab by mouth every eight (8) hours as needed for Nausea. 20 Tab 0    albuterol (PROVENTIL HFA, VENTOLIN HFA, PROAIR HFA) 90 mcg/actuation inhaler Take 2 Puffs by inhalation every four (4) hours as needed for Wheezing. 1 Inhaler 0       Allergies:   Allergies   Allergen Reactions    Latex Other (comments)    Latex Rash    Banana Itching    Banana Itching    Kiwi Other (comments)    Kiwi Itching    Nut Flavor Itching    Peanut Itching    Strawberry Itching    Tree Nuts Itching       Past Medical History:  Past Medical History:   Diagnosis Date    Acid reflux     Asthma     Bladder incontinence     Depression     Endocrine disease     isulin resistance    Headache(784.0)     IBS (irritable bowel syndrome)     Interstitial cystitis     Irregular menstrual cycle     Vertigo        Past Surgical History:  Past Surgical History:   Procedure Laterality Date    HX COLONOSCOPY         Family History:  Family History   Problem Relation Age of Onset   [place] Arthritis-osteo Mother     Asthma Mother     Headache Mother         migraines    Anxiety Mother     Hypertension Maternal Grandmother     Hypertension Maternal Grandfather     Cancer Maternal Grandfather         prostate    Hypertension Paternal Grandmother     Diabetes Paternal Grandmother     Thyroid Disease Paternal Grandmother     Hypertension Paternal Grandfather     Diabetes Paternal Grandfather     Thyroid Disease Paternal Grandfather        Social History:  Social History     Tobacco Use    Smoking status: Never Smoker    Smokeless tobacco: Never Used   Substance Use Topics    Alcohol use: Yes     Comment: rarely       Review of Systems   Constitutional: Negative. HENT: Negative. Eyes: Negative. Respiratory: Positive for shortness of breath (mild). Cardiovascular: Negative. Genitourinary: Negative. Musculoskeletal: Positive for joint pain and myalgias. Neurological: Negative. Endo/Heme/Allergies: Negative. Psychiatric/Behavioral: The patient is nervous/anxious. All other systems reviewed and are negative. Objective:   No flowsheet data found.      General: alert, cooperative, no distress   Mental  status: normal mood, behavior, speech, dress, motor activity, and thought processes, able to follow commands   HENT: NCAT   Neck: no visualized mass   Resp: no respiratory distress   Neuro: no gross deficits   Skin: no discoloration or lesions of concern on visible areas   Psychiatric: normal affect, consistent with stated mood, no evidence of hallucinations       We discussed the expected course, resolution and complications of the diagnosis(es) in detail. Medication risks, benefits, costs, interactions, and alternatives were discussed as indicated. I advised her to contact the office if her condition worsens, changes or fails to improve as anticipated. She expressed understanding with the diagnosis(es) and plan. Maile Frausto, who was evaluated through a patient-initiated, synchronous (real-time) audio-video encounter, and/or her healthcare decision maker, is aware that it is a billable service, with coverage as determined by her insurance carrier. She provided verbal consent to proceed: Yes, and patient identification was verified. It was conducted pursuant to the emergency declaration under the [address] [place] [address], [address] [place] [address] authority and the [employer] Act. A caregiver was present when appropriate. Ability to conduct physical exam was limited. I was in the office. The patient was at work, but in a  [place]  area. .      Follow-up and Dispositions    · Return if symptoms worsen or fail to improve.          Tiffany Jessica MD

## 2020-09-18 ENCOUNTER — HOSPITAL ENCOUNTER (OUTPATIENT)
Dept: ULTRASOUND IMAGING | Age: 25
Discharge: HOME | End: 2020-09-18
Attending: INTERNAL MEDICINE
Payer: COMMERCIAL

## 2020-09-18 DIAGNOSIS — R59.0 LYMPHADENOPATHY, INGUINAL: ICD-10-CM

## 2020-09-18 LAB
25(OH)D3 SERPL-MCNC: 12.8 NG/ML (ref 30–100)
ALBUMIN SERPL-MCNC: 3.6 G/DL (ref 3.5–5)
ALBUMIN/GLOB SERPL: 0.9 {RATIO} (ref 1.1–2.2)
ALP SERPL-CCNC: 86 U/L (ref 45–117)
ALT SERPL-CCNC: 45 U/L (ref 12–78)
ANION GAP SERPL CALC-SCNC: 4 MMOL/L (ref 5–15)
AST SERPL-CCNC: 16 U/L (ref 15–37)
BILIRUB SERPL-MCNC: 0.3 MG/DL (ref 0.2–1)
BUN SERPL-MCNC: 11 MG/DL (ref 6–20)
BUN/CREAT SERPL: 14 (ref 12–20)
CALCIUM SERPL-MCNC: 9 MG/DL (ref 8.5–10.1)
CHLORIDE SERPL-SCNC: 105 MMOL/L (ref 97–108)
CO2 SERPL-SCNC: 27 MMOL/L (ref 21–32)
CREAT SERPL-MCNC: 0.81 MG/DL (ref 0.55–1.02)
FOLATE SERPL-MCNC: 15.9 NG/ML (ref 5–21)
GLOBULIN SER CALC-MCNC: 4.2 G/DL (ref 2–4)
GLUCOSE SERPL-MCNC: 80 MG/DL (ref 65–100)
POTASSIUM SERPL-SCNC: 4.1 MMOL/L (ref 3.5–5.1)
PROT SERPL-MCNC: 7.8 G/DL (ref 6.4–8.2)
SODIUM SERPL-SCNC: 136 MMOL/L (ref 136–145)
VIT B12 SERPL-MCNC: 360 PG/ML (ref 193–986)

## 2020-09-18 PROCEDURE — 76882 US LMTD JT/FCL EVL NVASC XTR: CPT

## 2020-09-19 DIAGNOSIS — E55.9 VITAMIN D DEFICIENCY: Primary | ICD-10-CM

## 2020-09-19 RX ORDER — ERGOCALCIFEROL 1.25 MG/1
50000 CAPSULE ORAL
Qty: 5 CAP | Refills: 5 | Status: SHIPPED | OUTPATIENT
Start: 2020-09-19 | End: 2020-10-19

## 2020-11-20 ENCOUNTER — TRANSCRIBE ORDER (OUTPATIENT)
Dept: SCHEDULING | Age: 25
End: 2020-11-20

## 2020-11-20 DIAGNOSIS — R11.0 NAUSEA: Primary | ICD-10-CM

## 2020-11-27 ENCOUNTER — HOSPITAL ENCOUNTER (OUTPATIENT)
Dept: NUCLEAR MEDICINE | Age: 25
Discharge: HOME | End: 2020-11-27
Attending: NURSE PRACTITIONER
Payer: COMMERCIAL

## 2020-11-27 DIAGNOSIS — R11.0 NAUSEA: ICD-10-CM

## 2020-11-27 PROCEDURE — 78264 GASTRIC EMPTYING IMG STUDY: CPT

## 2021-03-30 ENCOUNTER — OFFICE VISIT (OUTPATIENT)
Dept: FAMILY MEDICINE CLINIC | Age: 26
End: 2021-03-30
Payer: COMMERCIAL

## 2021-03-30 VITALS
OXYGEN SATURATION: 98 % | WEIGHT: 216 LBS | HEIGHT: 66 IN | HEART RATE: 107 BPM | DIASTOLIC BLOOD PRESSURE: 68 MMHG | BODY MASS INDEX: 34.72 KG/M2 | TEMPERATURE: 98.1 F | RESPIRATION RATE: 18 BRPM | SYSTOLIC BLOOD PRESSURE: 108 MMHG

## 2021-03-30 DIAGNOSIS — R53.83 MALAISE AND FATIGUE: ICD-10-CM

## 2021-03-30 DIAGNOSIS — R53.81 MALAISE AND FATIGUE: ICD-10-CM

## 2021-03-30 DIAGNOSIS — R55 NEAR SYNCOPE: Primary | ICD-10-CM

## 2021-03-30 DIAGNOSIS — R25.1 TREMOR: ICD-10-CM

## 2021-03-30 DIAGNOSIS — F32.A MILD DEPRESSION: ICD-10-CM

## 2021-03-30 PROBLEM — N92.6 IRREGULAR PERIODS: Status: ACTIVE | Noted: 2017-08-29

## 2021-03-30 PROBLEM — Z80.3 FAMILY HISTORY OF BREAST CANCER: Status: ACTIVE | Noted: 2017-08-29

## 2021-03-30 PROBLEM — N76.6 ULCERATION OF VULVA: Status: ACTIVE | Noted: 2017-09-20

## 2021-03-30 PROBLEM — E66.9 OBESITY: Status: ACTIVE | Noted: 2017-08-29

## 2021-03-30 PROBLEM — N64.4 PAIN OF BREAST: Status: ACTIVE | Noted: 2017-08-31

## 2021-03-30 PROBLEM — Z30.41 USES ORAL CONTRACEPTIVES: Status: ACTIVE | Noted: 2018-04-18

## 2021-03-30 PROBLEM — F41.9 ANXIETY: Status: ACTIVE | Noted: 2018-04-18

## 2021-03-30 PROBLEM — E28.2 POLYCYSTIC OVARIES: Status: ACTIVE | Noted: 2019-08-14

## 2021-03-30 PROCEDURE — 99213 OFFICE O/P EST LOW 20 MIN: CPT | Performed by: INTERNAL MEDICINE

## 2021-03-30 RX ORDER — ETONOGESTREL AND ETHINYL ESTRADIOL 11.7; 2.7 MG/1; MG/1
1 INSERT, EXTENDED RELEASE VAGINAL
COMMUNITY
End: 2022-06-08 | Stop reason: SINTOL

## 2021-03-30 RX ORDER — ERGOCALCIFEROL 1.25 MG/1
CAPSULE ORAL
COMMUNITY
Start: 2021-02-19 | End: 2022-06-08 | Stop reason: SINTOL

## 2021-03-30 NOTE — ASSESSMENT & PLAN NOTE
Stable, based on history, physical exam and review of pertinent labs, studies and medications; meds reconciled; continue current treatment plan, lifestyle modifications recommended, medication compliance emphasized.

## 2021-03-30 NOTE — PROGRESS NOTES
Chief Complaint   Patient presents with   [facility] Follow Up     [facility] ER 3/26/21 fall at work- body went weak and she fell        Assessment/ Plan:   Diagnoses and all orders for this visit:    1. Near syncope   Requested paperwork from [facility].    Will review. However, will send for lab work as outlined below. If any concerning features from ER visit, will contact the patient. Unclear the etiology for her symptoms. She reports normal work up in the ER as per the note below. 2. Mild depression ([address]  Assessment & Plan:  Stable, based on history, physical exam and review of pertinent labs, studies and medications; meds reconciled; continue current treatment plan, lifestyle modifications recommended, medication compliance emphasized. Orders:  -     REFERRAL TO BEHAVIORAL HEALTH    3. Tremor   Noted today with mild tachycardia. Will check TSH. 4. Malaise and fatigue  -     TSH 3RD GENERATION; Future  -     T3 UPTAKE PROFILE; Future  -     T4 (THYROXINE); Future    I have discussed the diagnosis with the patient and the intended treatment plan as seen in the above orders. The patient has received an after-visit summary and questions were answered concerning future plans. Asked to return should symptoms worsen or not improve with treatment. Any pending labs and studies will be relayed to patient when they become available. Pt verbalizes understanding of plan of care and denies further questions or concerns at this time. Follow-up and Dispositions    · Return in about 1 month (around 4/30/2021), or if symptoms worsen or fail to improve. Subjective:   Joselito Carrillo is a 23 y.o. female who presents today for follow up after a near syncopal event in which she was walking and felt her body go limp and she fell. She did not strike her head. She was taken to the ER at [facility] and the work up per her report was negative. She did not have a head CT-scan.  She did have an EKG and blood work and reports these were normal. We have requested the report. She has the below PMH. Of note, today she is noted to be slightly tremulous with an elevated pulse. She has not had a thyroid checked in quite sometime. She does have PCOS. She reports that her period has been present for 14-days and now lightening up. She did have a urine pregnancy test and this was negative - she reports that she is not sexually active. She denies anxiety. She has depressed lately, but she goes through those cycles and then it gets better. However, she would like to speak with someone and requesting a referral.     Patient Active Problem List    Diagnosis Date Noted    Polycystic ovaries 08/14/2019    Mild depression ([facility] [address] 10/16/2018    GI problem 10/09/2018    Anxiety 04/18/2018    Uses oral contraceptives 04/18/2018    Ulceration of vulva 09/20/2017    Pain of breast 08/31/2017    Family history of breast cancer 08/29/2017    Irregular periods 08/29/2017    Obesity 08/29/2017    Chronic interstitial cystitis 12/03/2013    Pain in pelvis 11/12/2013    Insulin resistance 02/27/2013    Acanthosis 06/21/2012    Family history of malignant neoplasm of ovary 05/03/2012         Current Outpatient Medications   Medication Sig Dispense Refill    ethinyl estradiol-etonogestrel (NUVARING) 0.12-0.015 mg/24 hr vaginal ring 1 Each by Intravaginal route.  ergocalciferol (ERGOCALCIFEROL) 1,250 mcg (50,000 unit) capsule TAKE 1 CAP BY MOUTH EVERY SEVEN (7) DAYS FOR 30 DAYS.  ibuprofen (MOTRIN) 600 mg tablet Take 1 Tab by mouth every eight (8) hours as needed for Pain (take with food). 20 Tab 0    SENNA by Does Not Apply route as needed.  dicyclomine (BENTYL) 20 mg tablet Take 1 Tab by mouth every six (6) hours as needed for up to 20 doses. 20 Tab 0    ondansetron (ZOFRAN ODT) 4 mg disintegrating tablet Take 1 Tab by mouth every eight (8) hours as needed for Nausea.  20 Tab 0    albuterol (PROVENTIL HFA, VENTOLIN HFA, PROAIR HFA) 90 mcg/actuation inhaler Take 2 Puffs by inhalation every four (4) hours as needed for Wheezing. 1 Inhaler 0    levonorgestrel-ethinyl estradiol (Lillow, 28,) 0.15-0.03 mg tab       tiZANidine (ZANAFLEX) 2 mg tablet 1-2 tablets, 1-2 hours prior to bedtime. Indications: muscle spasm 30 Tab 0    LO LOESTRIN FE 1 mg-10 mcg (24)/10 mcg (2) tab TAKE 1 TABLET BY MOUTH EVERY DAY  0         Allergies   Allergen Reactions    Latex Other (comments)    Latex Rash    Banana Itching     Throat itching    Kiwi Anaphylaxis    Nut Flavor Itching     Throat itching     Peanut Itching     Throat itching    Strawberry Itching     Facial itching    Tree Nuts Itching     throat itching    Banana Itching    Kiwi Other (comments)         Past Medical History:   Diagnosis Date    Acid reflux     Asthma     Bladder incontinence     Depression     Endocrine disease     isulin resistance    Headache(784.0)     IBS (irritable bowel syndrome)     Interstitial cystitis     Irregular menstrual cycle     Vertigo          Past Surgical History:   Procedure Laterality Date    HX COLONOSCOPY           Family History   Problem Relation Age of Onset    Arthritis-osteo Mother     Asthma Mother     Headache Mother         migraines    Anxiety Mother     Hypertension Maternal Grandmother     Hypertension Maternal Grandfather     Cancer Maternal Grandfather         prostate    Hypertension Paternal Grandmother     Diabetes Paternal Grandmother     Thyroid Disease Paternal Grandmother     Hypertension Paternal Grandfather     Diabetes Paternal Grandfather     Thyroid Disease Paternal Grandfather          Social History     Tobacco Use    Smoking status: Never Smoker    Smokeless tobacco: Never Used   Substance Use Topics    Alcohol use: Yes     Comment: rarely       Review of Systems  Pertinent items are noted in HPI.      Objective:     Vitals:    03/30/21 1103   BP: 108/68   Pulse: (!) 107   Resp: 18   Temp: 98.1 °F (36.7 °C)   TempSrc: Temporal   SpO2: 98%   Weight: 216 lb (98 kg)   Height: 5' 6\" (1.676 m)       General: alert, cooperative, no distress   Mental  status: normal mood, behavior, speech, dress, motor activity, and thought processes, able to follow commands   HENT: NCAT   CVS CVS exam: tachycardia, S1 and S2 normal, no murmurs noted, no gallops noted. Lungs Chest: clear to auscultation, no wheezes, rales or rhonchi, symmetric air entry, no tachypnea, retractions or cyanosis. Neck: no visualized mass   Resp: no respiratory distress   Neuro: no gross deficits   Skin: no discoloration or lesions of concern on visible areas   Psychiatric: normal affect, consistent with stated mood, no evidence of hallucinations     Billy Huang MD    Patient Instructions          Lightheadedness or Faintness: Care Instructions  Your Care Instructions  Lightheadedness is a feeling that you are about to faint or \"pass out. \" You do not feel as if you or your surroundings are moving. It is different from vertigo, which is the feeling that you or things around you are spinning or tilting. Lightheadedness usually goes away or gets better when you lie down. If lightheadedness gets worse, it can lead to a fainting spell. It is common to feel lightheaded from time to time. Lightheadedness usually is not caused by a serious problem. It often is caused by a short-lasting drop in blood pressure and blood flow to your head that occurs when you get up too quickly from a seated or lying position. Follow-up care is a key part of your treatment and safety. Be sure to make and go to all appointments, and call your doctor if you are having problems. It's also a good idea to know your test results and keep a list of the medicines you take. How can you care for yourself at home? · Lie down for 1 or 2 minutes when you feel lightheaded.  After lying down, sit up slowly and remain sitting for 1 to 2 minutes before slowly standing up. · Avoid movements, positions, or activities that have made you lightheaded in the past.  · Get plenty of rest, especially if you have a cold or flu, which can cause lightheadedness. · Make sure you drink plenty of fluids, especially if you have a fever or have been sweating. · Do not drive or put yourself and others in danger while you feel lightheaded. When should you call for help? Call 911 anytime you think you may need emergency care. For example, call if:    · You have symptoms of a stroke. These may include:  ? Sudden numbness, tingling, weakness, or loss of movement in your face, arm, or leg, especially on only one side of your body. ? Sudden vision changes. ? Sudden trouble speaking. ? Sudden confusion or trouble understanding simple statements. ? Sudden problems with walking or balance. ? A sudden, severe headache that is different from past headaches.     · You have symptoms of a heart attack. These may include:  ? Chest pain or pressure, or a strange feeling in the chest.  ? Sweating. ? Shortness of breath. ? Nausea or vomiting. ? Pain, pressure, or a strange feeling in the back, neck, jaw, or upper belly or in one or both shoulders or arms. ? Lightheadedness or sudden weakness. ? A fast or irregular heartbeat. After you call 911, the  may tell you to chew 1 adult-strength or 2 to 4 low-dose aspirin. Wait for an ambulance. Do not try to drive yourself. Watch closely for changes in your health, and be sure to contact your doctor if:    · Your lightheadedness gets worse or does not get better with home care. Where can you learn more? Go to http://www.gray.com/  Enter [id number] in the search box to learn more about \"Lightheadedness or Faintness: Care Instructions. \"  Current as of: June 26, 2019               Content Version: 12.6  © 6457-2631 [employer].    Care instructions adapted under license by [employer] (which disclaims liability or warranty for this information). If you have questions about a medical condition or this instruction, always ask your healthcare professional. [address] any warranty or liability for your use of this information.

## 2021-03-30 NOTE — PATIENT INSTRUCTIONS
Lightheadedness or Faintness: Care Instructions  Your Care Instructions  Lightheadedness is a feeling that you are about to faint or \"pass out. \" You do not feel as if you or your surroundings are moving. It is different from vertigo, which is the feeling that you or things around you are spinning or tilting. Lightheadedness usually goes away or gets better when you lie down. If lightheadedness gets worse, it can lead to a fainting spell. It is common to feel lightheaded from time to time. Lightheadedness usually is not caused by a serious problem. It often is caused by a short-lasting drop in blood pressure and blood flow to your head that occurs when you get up too quickly from a seated or lying position. Follow-up care is a key part of your treatment and safety. Be sure to make and go to all appointments, and call your doctor if you are having problems. It's also a good idea to know your test results and keep a list of the medicines you take. How can you care for yourself at home? · Lie down for 1 or 2 minutes when you feel lightheaded. After lying down, sit up slowly and remain sitting for 1 to 2 minutes before slowly standing up. · Avoid movements, positions, or activities that have made you lightheaded in the past.  · Get plenty of rest, especially if you have a cold or flu, which can cause lightheadedness. · Make sure you drink plenty of fluids, especially if you have a fever or have been sweating. · Do not drive or put yourself and others in danger while you feel lightheaded. When should you call for help? Call 911 anytime you think you may need emergency care. For example, call if:    · You have symptoms of a stroke. These may include:  ? Sudden numbness, tingling, weakness, or loss of movement in your face, arm, or leg, especially on only one side of your body. ? Sudden vision changes. ? Sudden trouble speaking. ? Sudden confusion or trouble understanding simple statements.   ? Sudden problems with walking or balance. ? A sudden, severe headache that is different from past headaches.     · You have symptoms of a heart attack. These may include:  ? Chest pain or pressure, or a strange feeling in the chest.  ? Sweating. ? Shortness of breath. ? Nausea or vomiting. ? Pain, pressure, or a strange feeling in the back, neck, jaw, or upper belly or in one or both shoulders or arms. ? Lightheadedness or sudden weakness. ? A fast or irregular heartbeat. After you call 911, the  may tell you to chew 1 adult-strength or 2 to 4 low-dose aspirin. Wait for an ambulance. Do not try to drive yourself. Watch closely for changes in your health, and be sure to contact your doctor if:    · Your lightheadedness gets worse or does not get better with home care. Where can you learn more? Go to http://www.gray.com/  Enter [id number] in the search box to learn more about \"Lightheadedness or Faintness: Care Instructions. \"  Current as of: June 26, 2019               Content Version: 12.6  © 7970-8753 [employer]. Care instructions adapted under license by [employer] (which disclaims liability or warranty for this information). If you have questions about a medical condition or this instruction, always ask your healthcare professional. [address] any warranty or liability for your use of this information.

## 2021-03-30 NOTE — PROGRESS NOTES
Identified pt with two pt identifiers(name and ).     Chief Complaint   Patient presents with   [address] Follow Up     [address] ER 3/26/21 fall at work- body went weak and she fell         Health Maintenance Due   Topic    Hepatitis C Screening     COVID-19 Vaccine (1)    HPV Age 9Y-34Y (2 - 3-dose series)       Wt Readings from Last 3 Encounters:   21 216 lb (98 kg)   19 193 lb 3.2 oz (87.6 kg)   19 183 lb (83 kg)     Temp Readings from Last 3 Encounters:   21 98.1 °F (36.7 °C) (Temporal)   20 97.7 °F (36.5 °C)   19 98.1 °F (36.7 °C) (Oral)     BP Readings from Last 3 Encounters:   21 108/68   20 137/90   19 100/60     Pulse Readings from Last 3 Encounters:   21 (!) 107   20 89   19 64         Learning Assessment:  :     Learning Assessment 10/9/2018   PRIMARY LEARNER Patient   HIGHEST LEVEL OF EDUCATION - PRIMARY LEARNER  [employer]   BARRIERS PRIMARY LEARNER NONE   CO-LEARNER CAREGIVER No   PRIMARY LANGUAGE ENGLISH   LEARNER PREFERENCE PRIMARY READING   ANSWERED BY Geovani Lopez   RELATIONSHIP SELF       Depression Screening:  :     3 most recent PHQ Screens 3/30/2021   Little interest or pleasure in doing things Nearly every day   Feeling down, depressed, irritable, or hopeless Nearly every day   Total Score PHQ 2 6   Trouble falling or staying asleep, or sleeping too much Not at all   Feeling tired or having little energy Not at all   Poor appetite, weight loss, or overeating Not at all   Feeling bad about yourself - or that you are a failure or have let yourself or your family down More than half the days   Trouble concentrating on things such as school, work, reading, or watching TV Not at all   Moving or speaking so slowly that other people could have noticed; or the opposite being so fidgety that others notice Not at all   Thoughts of being better off dead, or hurting yourself in some way Not at all   PHQ 9 Score 8   How difficult have these problems made it for you to do your work, take care of your home and get along with others Not difficult at all       Fall Risk Assessment:  :     No flowsheet data found. Abuse Screening:  :     Abuse Screening Questionnaire 3/30/2021 10/9/2018   Do you ever feel afraid of your partner? N N   Are you in a relationship with someone who physically or mentally threatens you? N N   Is it safe for you to go home? Y Y       Coordination of Care Questionnaire:  :     1) Have you been to an emergency room, urgent care clinic since your last visit? yes [address]  3/26/21  Hospitalized since your last visit? no             2) Have you seen or consulted any other health care providers outside of [address] [place] [address] since your last visit? yes  FRANK Godfrey 3/21     3) Do you have an Advance Directive on file? yes  Are you interested in receiving information about Advance Directives? no    Reviewed record in preparation for visit and have obtained necessary documentation.

## 2021-03-31 LAB
FT4I SERPL CALC-MCNC: 1.5 (ref 1.2–4.9)
T3RU NFR SERPL: 17 % (ref 24–39)
T4 SERPL-MCNC: 8.8 UG/DL (ref 4.5–12)
TSH SERPL DL<=0.005 MIU/L-ACNC: 1.66 UIU/ML (ref 0.45–4.5)

## 2021-08-09 ENCOUNTER — HOSPITAL ENCOUNTER (OUTPATIENT)
Dept: GENERAL RADIOLOGY | Age: 26
Discharge: HOME | End: 2021-08-09
Payer: COMMERCIAL

## 2021-08-09 ENCOUNTER — TRANSCRIBE ORDER (OUTPATIENT)
Dept: REGISTRATION | Age: 26
End: 2021-08-09

## 2021-08-09 DIAGNOSIS — K31.84 GASTROPARESIS: ICD-10-CM

## 2021-08-09 DIAGNOSIS — K59.00 CONSTIPATION: ICD-10-CM

## 2021-08-09 DIAGNOSIS — K59.00 CONSTIPATION: Primary | ICD-10-CM

## 2021-08-09 PROCEDURE — 74018 RADEX ABDOMEN 1 VIEW: CPT

## 2022-03-18 PROBLEM — Z80.3 FAMILY HISTORY OF BREAST CANCER: Status: ACTIVE | Noted: 2017-08-29

## 2022-03-18 PROBLEM — N92.6 IRREGULAR PERIODS: Status: ACTIVE | Noted: 2017-08-29

## 2022-03-18 PROBLEM — N64.4 PAIN OF BREAST: Status: ACTIVE | Noted: 2017-08-31

## 2022-03-18 PROBLEM — N76.6 ULCERATION OF VULVA: Status: ACTIVE | Noted: 2017-09-20

## 2022-03-19 PROBLEM — F41.9 ANXIETY: Status: ACTIVE | Noted: 2018-04-18

## 2022-03-19 PROBLEM — F32.A MILD DEPRESSION: Status: ACTIVE | Noted: 2018-10-16

## 2022-03-19 PROBLEM — Z30.41 USES ORAL CONTRACEPTIVES: Status: ACTIVE | Noted: 2018-04-18

## 2022-03-20 PROBLEM — E28.2 POLYCYSTIC OVARIES: Status: ACTIVE | Noted: 2019-08-14

## 2022-03-20 PROBLEM — R19.8 GI PROBLEM: Status: ACTIVE | Noted: 2018-10-09

## 2022-03-20 PROBLEM — E66.9 OBESITY: Status: ACTIVE | Noted: 2017-08-29

## 2022-06-02 ENCOUNTER — TELEPHONE (OUTPATIENT)
Dept: FAMILY MEDICINE CLINIC | Age: 27
End: 2022-06-02

## 2022-06-02 NOTE — TELEPHONE ENCOUNTER
----- Message from Missy Barthel sent at 6/2/2022 10:32 AM EDT -----  Subject: Referral Request    QUESTIONS   Reason for referral request? Patient is wanting a referral to an   endocrinologist for female health issues. Has the physician seen you for this condition before? No   Preferred Specialist (if applicable)? Do you already have an appointment scheduled? No  Additional Information for Provider? Patient states that she has been seen   for this issue before though I see no previous appts. ---------------------------------------------------------------------------  --------------  Allean Jay INFO  What is the best way for the office to contact you? OK to leave message on   voicemail  Preferred Call Back Phone Number? [phone]  ---------------------------------------------------------------------------  --------------  SCRIPT ANSWERS  Relationship to Patient?  Self

## 2022-06-08 ENCOUNTER — OFFICE VISIT (OUTPATIENT)
Dept: FAMILY MEDICINE CLINIC | Age: 27
End: 2022-06-08
Payer: COMMERCIAL

## 2022-06-08 ENCOUNTER — APPOINTMENT (OUTPATIENT)
Dept: FAMILY MEDICINE CLINIC | Age: 27
End: 2022-06-08

## 2022-06-08 VITALS
HEIGHT: 66 IN | WEIGHT: 234 LBS | SYSTOLIC BLOOD PRESSURE: 122 MMHG | HEART RATE: 86 BPM | BODY MASS INDEX: 37.61 KG/M2 | RESPIRATION RATE: 18 BRPM | TEMPERATURE: 98.8 F | DIASTOLIC BLOOD PRESSURE: 78 MMHG | OXYGEN SATURATION: 98 %

## 2022-06-08 DIAGNOSIS — Z11.59 ENCOUNTER FOR HEPATITIS C SCREENING TEST FOR LOW RISK PATIENT: ICD-10-CM

## 2022-06-08 DIAGNOSIS — E28.2 PCOS (POLYCYSTIC OVARIAN SYNDROME): Primary | ICD-10-CM

## 2022-06-08 DIAGNOSIS — Z13.220 SCREENING FOR HYPERLIPIDEMIA: ICD-10-CM

## 2022-06-08 DIAGNOSIS — N92.1 MENORRHAGIA WITH IRREGULAR CYCLE: ICD-10-CM

## 2022-06-08 DIAGNOSIS — Z13.1 SCREENING FOR DIABETES MELLITUS (DM): ICD-10-CM

## 2022-06-08 DIAGNOSIS — L68.0 HIRSUTISM: ICD-10-CM

## 2022-06-08 DIAGNOSIS — N92.6 IRREGULAR MENSES: ICD-10-CM

## 2022-06-08 DIAGNOSIS — R63.5 WEIGHT GAIN: ICD-10-CM

## 2022-06-08 PROCEDURE — 99213 OFFICE O/P EST LOW 20 MIN: CPT | Performed by: INTERNAL MEDICINE

## 2022-06-08 RX ORDER — MELATONIN
1000 DAILY
COMMUNITY

## 2022-06-08 RX ORDER — CYANOCOBALAMIN (VITAMIN B-12) 5000 MCG
5000 TABLET,IMMED, EXTENDED RELEASE, BIPHASIC ORAL DAILY
COMMUNITY

## 2022-06-08 RX ORDER — MULTIVIT-MINERALS/FOLIC ACID 200 MCG
1 TABLET,CHEWABLE ORAL DAILY
COMMUNITY

## 2022-06-08 NOTE — PATIENT INSTRUCTIONS
Polycystic Ovary Syndrome: Care Instructions  Overview  Polycystic ovary syndrome (PCOS) is a hormone imbalance that can affect ovulation. It can cause problems with your periods and make it hard to get pregnant. Doctors don't know for sure what causes PCOS, but it seems to run in families. It also seems to be linked to obesity and a risk for diabetes. You may have other symptoms. These include weight gain, acne, hair growth on the face or body, high blood pressure, and high blood sugar. Your ovaries may have cysts on them. These cysts are growths filled with fluid. Keep in mind that even though you may not have regular periods, you can still get pregnant. Talk to your doctor about birth control if you don't want to get pregnant. Sometimes the hormone changes with PCOS can also make it hard to get pregnant. If this is a concern, talk to your doctor about treatment for this problem. With PCOS, you may go for months or longer with no period. Your doctor may recommend medicines that can help regulate your cycle. Follow-up care is a key part of your treatment and safety. Be sure to make and go to all appointments, and call your doctor if you are having problems. It's also a good idea to know your test results and keep a list of the medicines you take. How can you care for yourself at home? · Take your medicines exactly as prescribed. Call your doctor if you think you're having a problem with your medicine. · Eat a healthy diet. Include vegetables, fruits, beans, and whole grains in your diet each day. · If you're overweight, talk to your doctor about safe ways to lose weight. Losing weight can help with many of the symptoms of PCOS. · Get at least 30 minutes of exercise on most days of the week. Walking is a good choice. Or you can run, swim, cycle, or play team sports. · If you have symptoms that bother you, such as acne and excess hair growth, talk to your doctor about treatment options.  Medicines can help. For unwanted hair growth, some prefer to use home treatments. These can include shaving, waxing, or other methods to remove the hair. · If you're feeling sad or depressed, consider talking to a counselor or to others who have PCOS. It may help. When should you call for help? Call your doctor now or seek immediate medical care if:    · You have severe vaginal bleeding.     · You have new or worse belly or pelvic pain. Watch closely for changes in your health, and be sure to contact your doctor if:    · You do not get better as expected.     · You have unusual vaginal bleeding. Where can you learn more? Go to http://www.gray.com/  Enter K559 in the search box to learn more about \"Polycystic Ovary Syndrome: Care Instructions. \"  Current as of: November 22, 2021               Content Version: 13.2  © 5769-7694 Healthwise, Incorporated. Care instructions adapted under license by [employer] (which disclaims liability or warranty for this information). If you have questions about a medical condition or this instruction, always ask your healthcare professional. [address] any warranty or liability for your use of this information.

## 2022-06-08 NOTE — PROGRESS NOTES
Identified pt with two pt identifiers(name and ).     Chief Complaint   Patient presents with    PCOS     has concerns         Health Maintenance Due   Topic    Hepatitis C Screening     HPV Age 9Y-34Y (2 - 3-dose series)    COVID-19 Vaccine (3 - Booster for Ampulse series)    Pap Smear     Depression Monitoring        Wt Readings from Last 3 Encounters:   22 234 lb (106.1 kg)   21 216 lb (98 kg)   19 193 lb 3.2 oz (87.6 kg)     Temp Readings from Last 3 Encounters:   22 98.8 °F (37.1 °C) (Temporal)   21 98.1 °F (36.7 °C) (Temporal)   20 97.7 °F (36.5 °C)     BP Readings from Last 3 Encounters:   22 122/78   21 108/68   20 137/90     Pulse Readings from Last 3 Encounters:   22 86   21 (!) 107   20 89         Learning Assessment:  :     Learning Assessment 10/9/2018   PRIMARY LEARNER Patient   HIGHEST LEVEL OF EDUCATION - PRIMARY LEARNER  TR[employer]CHO[employer]   BARRIERS PRIMARY LEARNER NONE   CO-LEARNER CAREGIVER No   PRIMARY LANGUAGE ENGLISH   LEARNER PREFERENCE PRIMARY READING   ANSWERED BY William Lopez   RELATIONSHIP SELF       Depression Screening:  :     3 most recent PHQ Screens 2022   Little interest or pleasure in doing things Several days   Feeling down, depressed, irritable, or hopeless Nearly every day   Total Score PHQ 2 4   Trouble falling or staying asleep, or sleeping too much Several days   Feeling tired or having little energy Several days   Poor appetite, weight loss, or overeating Not at all   Feeling bad about yourself - or that you are a failure or have let yourself or your family down Several days   Trouble concentrating on things such as school, work, reading, or watching TV Several days   Moving or speaking so slowly that other people could have noticed; or the opposite being so fidgety that others notice Not at all   Thoughts of being better off dead, or hurting yourself in some way Not at all   PHQ 9 Score 8   How difficult have these problems made it for you to do your work, take care of your home and get along with others Not difficult at all       Fall Risk Assessment:  :     No flowsheet data found. Abuse Screening:  :     Abuse Screening Questionnaire 6/8/2022 3/30/2021 10/9/2018   Do you ever feel afraid of your partner? N N N   Are you in a relationship with someone who physically or mentally threatens you? N N N   Is it safe for you to go home? Y Y Y       Coordination of Care Questionnaire:  :     1) Have you been to an emergency room, urgent care clinic since your last visit? no   Hospitalized since your last visit? no             2) Have you seen or consulted any other health care providers outside of [address] [place] [address] since your last visit? no  (Include any pap smears or colon screenings in this section.)    3) Do you have an Advance Directive on file? yes  Are you interested in receiving information about Advance Directives? no    4. For patients aged 39-70: Has the patient had a colonoscopy / FIT/ Cologuard? NA - based on age      If the patient is female:    11. For patients aged 41-77: Has the patient had a mammogram within the past 2 years? NA - based on age or sex      10. For patients aged 21-65: Has the patient had a pap smear? Yes - Care Gap present.  Rooming MA/LPN to request most recent results [address] [facility]

## 2022-06-09 LAB
ALBUMIN SERPL-MCNC: 3.8 G/DL (ref 3.5–5)
ALBUMIN/GLOB SERPL: 0.9 {RATIO} (ref 1.1–2.2)
ALP SERPL-CCNC: 98 U/L (ref 45–117)
ALT SERPL-CCNC: 53 U/L (ref 12–78)
ANION GAP SERPL CALC-SCNC: 6 MMOL/L (ref 5–15)
AST SERPL-CCNC: 22 U/L (ref 15–37)
BASOPHILS # BLD: 0 K/UL (ref 0–0.1)
BASOPHILS NFR BLD: 1 % (ref 0–1)
BILIRUB SERPL-MCNC: 0.5 MG/DL (ref 0.2–1)
BUN SERPL-MCNC: 14 MG/DL (ref 6–20)
BUN/CREAT SERPL: 19 (ref 12–20)
CALCIUM SERPL-MCNC: 9.8 MG/DL (ref 8.5–10.1)
CHLORIDE SERPL-SCNC: 104 MMOL/L (ref 97–108)
CHOLEST SERPL-MCNC: 195 MG/DL
CO2 SERPL-SCNC: 28 MMOL/L (ref 21–32)
CREAT SERPL-MCNC: 0.72 MG/DL (ref 0.55–1.02)
DIFFERENTIAL METHOD BLD: NORMAL
EOSINOPHIL # BLD: 0.2 K/UL (ref 0–0.4)
EOSINOPHIL NFR BLD: 3 % (ref 0–7)
ERYTHROCYTE [DISTWIDTH] IN BLOOD BY AUTOMATED COUNT: 14.3 % (ref 11.5–14.5)
EST. AVERAGE GLUCOSE BLD GHB EST-MCNC: 97 MG/DL
GLOBULIN SER CALC-MCNC: 4.4 G/DL (ref 2–4)
GLUCOSE SERPL-MCNC: 89 MG/DL (ref 65–100)
HBA1C MFR BLD: 5 % (ref 4–5.6)
HCT VFR BLD AUTO: 45.1 % (ref 35–47)
HCV AB SERPL QL IA: NONREACTIVE
HDLC SERPL-MCNC: 65 MG/DL
HDLC SERPL: 3 {RATIO} (ref 0–5)
HGB BLD-MCNC: 13.7 G/DL (ref 11.5–16)
IMM GRANULOCYTES # BLD AUTO: 0 K/UL (ref 0–0.04)
IMM GRANULOCYTES NFR BLD AUTO: 0 % (ref 0–0.5)
LDLC SERPL CALC-MCNC: 116.8 MG/DL (ref 0–100)
LYMPHOCYTES # BLD: 2.2 K/UL (ref 0.8–3.5)
LYMPHOCYTES NFR BLD: 36 % (ref 12–49)
MCH RBC QN AUTO: 27.7 PG (ref 26–34)
MCHC RBC AUTO-ENTMCNC: 30.4 G/DL (ref 30–36.5)
MCV RBC AUTO: 91.3 FL (ref 80–99)
MONOCYTES # BLD: 0.6 K/UL (ref 0–1)
MONOCYTES NFR BLD: 10 % (ref 5–13)
NEUTS SEG # BLD: 3 K/UL (ref 1.8–8)
NEUTS SEG NFR BLD: 50 % (ref 32–75)
NRBC # BLD: 0 K/UL (ref 0–0.01)
NRBC BLD-RTO: 0 PER 100 WBC
PLATELET # BLD AUTO: 327 K/UL (ref 150–400)
PMV BLD AUTO: 11.3 FL (ref 8.9–12.9)
POTASSIUM SERPL-SCNC: 4.7 MMOL/L (ref 3.5–5.1)
PROT SERPL-MCNC: 8.2 G/DL (ref 6.4–8.2)
RBC # BLD AUTO: 4.94 M/UL (ref 3.8–5.2)
SODIUM SERPL-SCNC: 138 MMOL/L (ref 136–145)
T4 SERPL-MCNC: 8.4 UG/DL (ref 4.8–13.9)
TRIGL SERPL-MCNC: 66 MG/DL (ref ?–150)
TSH SERPL DL<=0.05 MIU/L-ACNC: 1.53 UIU/ML (ref 0.36–3.74)
VLDLC SERPL CALC-MCNC: 13.2 MG/DL
WBC # BLD AUTO: 6 K/UL (ref 3.6–11)

## 2022-06-13 NOTE — PROGRESS NOTES
Chief Complaint   Patient presents with    PCOS     has concerns        Assessment/ Plan:   Diagnoses and all orders for this visit:    1. PCOS (polycystic ovarian syndrome)  -     METABOLIC PANEL, COMPREHENSIVE; Future  -     CBC WITH AUTOMATED DIFF; Future  -     REFERRAL TO ENDOCRINOLOGY    2. Weight gain  -     METABOLIC PANEL, COMPREHENSIVE; Future  -     CBC WITH AUTOMATED DIFF; Future  -     REFERRAL TO ENDOCRINOLOGY    3. Irregular menses  -     TSH 3RD GENERATION; Future  -     T4 (THYROXINE); Future  -     REFERRAL TO ENDOCRINOLOGY    4. Menorrhagia with irregular cycle  -     TSH 3RD GENERATION; Future  -     T4 (THYROXINE); Future  -     REFERRAL TO ENDOCRINOLOGY    5. Hirsutism  -     REFERRAL TO ENDOCRINOLOGY    6. Screening for diabetes mellitus (DM)  -     HEMOGLOBIN A1C WITH EAG; Future    7. Encounter for hepatitis C screening test for low risk patient  -     HEPATITIS C AB; Future    8. Screening for hyperlipidemia  -     LIPID PANEL; Future    I have discussed the diagnosis with the patient and the intended treatment plan as seen in the above orders. The patient has received an after-visit summary and questions were answered concerning future plans. Asked to return should symptoms worsen or not improve with treatment. Any pending labs and studies will be relayed to patient when they become available. Pt verbalizes understanding of plan of care and denies further questions or concerns at this time. Follow-up and Dispositions    · Return if symptoms worsen or fail to improve. Subjective:   Bárbara Jimenes is a 24 y.o. female who presents for evaluation of PCOS and also referral to endocrinology. She was seen by her gynecologist due to irregular menses, increased facial hair and also irregular menses. She was offered Metformin and OCP. However, she repots that Metformin gives her significant gastric distress and she felt like that was the only option given to her.  She tried to explain this to the provider, but was shut down. She has gained a significant amount of weight as well and quite worried that she won't be able to get this back under control. HISTORICAL  PMH, PSH, FHX, SOCHX, ALLERGIES and MES were reviewed and updated today. Review of Systems  Review of Systems   Constitutional: Negative. HENT: Negative. Eyes: Negative. Respiratory: Negative. Cardiovascular: Negative. Gastrointestinal: Negative. Genitourinary: Negative. Musculoskeletal: Negative. Skin: Negative. Neurological: Negative. Endo/Heme/Allergies: Negative. Psychiatric/Behavioral: Negative. All other systems reviewed and are negative. Objective:     Vitals:    06/08/22 1446   BP: 122/78   Pulse: 86   Resp: 18   Temp: 98.8 °F (37.1 °C)   TempSrc: Temporal   SpO2: 98%   Weight: 234 lb (106.1 kg)   Height: 5' 6\" (1.676 m)       Physical Exam  Vitals and nursing note reviewed. Constitutional:       Appearance: Normal appearance. HENT:      Head: Normocephalic and atraumatic. Mouth/Throat:      Mouth: Mucous membranes are moist.   Eyes:      Extraocular Movements: Extraocular movements intact. Conjunctiva/sclera: Conjunctivae normal.      Pupils: Pupils are equal, round, and reactive to light. Cardiovascular:      Rate and Rhythm: Normal rate and regular rhythm. Pulses: Normal pulses. Heart sounds: Normal heart sounds. Pulmonary:      Effort: Pulmonary effort is normal.      Breath sounds: Normal breath sounds. Musculoskeletal:      Cervical back: Normal range of motion and neck supple. Neurological:      General: No focal deficit present. Mental Status: She is alert. Mental status is at baseline. Cranial Nerves: No cranial nerve deficit. Sensory: No sensory deficit. Motor: No weakness.       Coordination: Coordination normal.      Gait: Gait normal.      Deep Tendon Reflexes: Reflexes normal.   Psychiatric:         Mood and Affect: Mood normal.         Behavior: Behavior normal.         Thought Content: Thought content normal.         Judgment: Judgment normal.       Anjum Juárez MD  [facility]   02/21/22 10:06 AM    Patient Instructions          Polycystic Ovary Syndrome: Care Instructions  Overview  Polycystic ovary syndrome (PCOS) is a hormone imbalance that can affect ovulation. It can cause problems with your periods and make it hard to get pregnant. Doctors don't know for sure what causes PCOS, but it seems to run in families. It also seems to be linked to obesity and a risk for diabetes. You may have other symptoms. These include weight gain, acne, hair growth on the face or body, high blood pressure, and high blood sugar. Your ovaries may have cysts on them. These cysts are growths filled with fluid. Keep in mind that even though you may not have regular periods, you can still get pregnant. Talk to your doctor about birth control if you don't want to get pregnant. Sometimes the hormone changes with PCOS can also make it hard to get pregnant. If this is a concern, talk to your doctor about treatment for this problem. With PCOS, you may go for months or longer with no period. Your doctor may recommend medicines that can help regulate your cycle. Follow-up care is a key part of your treatment and safety. Be sure to make and go to all appointments, and call your doctor if you are having problems. It's also a good idea to know your test results and keep a list of the medicines you take. How can you care for yourself at home? · Take your medicines exactly as prescribed. Call your doctor if you think you're having a problem with your medicine. · Eat a healthy diet. Include vegetables, fruits, beans, and whole grains in your diet each day. · If you're overweight, talk to your doctor about safe ways to lose weight. Losing weight can help with many of the symptoms of PCOS.   · Get at least 30 minutes of exercise on most days of the week. Walking is a good choice. Or you can run, swim, cycle, or play team sports. · If you have symptoms that bother you, such as acne and excess hair growth, talk to your doctor about treatment options. Medicines can help. For unwanted hair growth, some prefer to use home treatments. These can include shaving, waxing, or other methods to remove the hair. · If you're feeling sad or depressed, consider talking to a counselor or to others who have PCOS. It may help. When should you call for help? Call your doctor now or seek immediate medical care if:    · You have severe vaginal bleeding.     · You have new or worse belly or pelvic pain. Watch closely for changes in your health, and be sure to contact your doctor if:    · You do not get better as expected.     · You have unusual vaginal bleeding. Where can you learn more? Go to http://www.gray.com/  Enter K559 in the search box to learn more about \"Polycystic Ovary Syndrome: Care Instructions. \"  Current as of: November 22, 2021               Content Version: 13.2  © 7614-4087 Healthwise, Incorporated. Care instructions adapted under license by [employer] (which disclaims liability or warranty for this information). If you have questions about a medical condition or this instruction, always ask your healthcare professional. [address] any warranty or liability for your use of this information.

## 2022-08-27 ENCOUNTER — APPOINTMENT (OUTPATIENT)
Dept: CT IMAGING | Age: 27
End: 2022-08-27
Attending: EMERGENCY MEDICINE

## 2022-08-27 ENCOUNTER — HOSPITAL ENCOUNTER (EMERGENCY)
Age: 27
Discharge: HOME | End: 2022-08-27
Attending: EMERGENCY MEDICINE

## 2022-08-27 VITALS
WEIGHT: 237.66 LBS | BODY MASS INDEX: 38.19 KG/M2 | HEART RATE: 97 BPM | SYSTOLIC BLOOD PRESSURE: 124 MMHG | DIASTOLIC BLOOD PRESSURE: 73 MMHG | OXYGEN SATURATION: 100 % | HEIGHT: 66 IN | TEMPERATURE: 98.5 F | RESPIRATION RATE: 20 BRPM

## 2022-08-27 DIAGNOSIS — K52.9 COLITIS: Primary | ICD-10-CM

## 2022-08-27 DIAGNOSIS — A41.9 SEPSIS WITHOUT ACUTE ORGAN DYSFUNCTION, DUE TO UNSPECIFIED ORGANISM (HCC): ICD-10-CM

## 2022-08-27 LAB
ALBUMIN SERPL-MCNC: 3.5 G/DL (ref 3.5–5)
ALBUMIN/GLOB SERPL: 0.7 {RATIO} (ref 1.1–2.2)
ALP SERPL-CCNC: 103 U/L (ref 45–117)
ALT SERPL-CCNC: 38 U/L (ref 12–78)
ANION GAP SERPL CALC-SCNC: 12 MMOL/L (ref 5–15)
APPEARANCE UR: ABNORMAL
AST SERPL-CCNC: ABNORMAL U/L (ref 15–37)
BACTERIA URNS QL MICRO: ABNORMAL /HPF
BASOPHILS # BLD: 0.1 K/UL (ref 0–0.1)
BASOPHILS NFR BLD: 0 % (ref 0–1)
BILIRUB SERPL-MCNC: 0.9 MG/DL (ref 0.2–1)
BILIRUB UR QL: NEGATIVE
BUN SERPL-MCNC: 10 MG/DL (ref 6–20)
BUN/CREAT SERPL: 14 (ref 12–20)
CALCIUM SERPL-MCNC: 8.9 MG/DL (ref 8.5–10.1)
CHLORIDE SERPL-SCNC: 101 MMOL/L (ref 97–108)
CO2 SERPL-SCNC: 24 MMOL/L (ref 21–32)
COLOR UR: ABNORMAL
CREAT SERPL-MCNC: 0.73 MG/DL (ref 0.55–1.02)
DIFFERENTIAL METHOD BLD: ABNORMAL
EOSINOPHIL # BLD: 0.2 K/UL (ref 0–0.4)
EOSINOPHIL NFR BLD: 1 % (ref 0–7)
EPITH CASTS URNS QL MICRO: ABNORMAL /LPF
ERYTHROCYTE [DISTWIDTH] IN BLOOD BY AUTOMATED COUNT: 13.2 % (ref 11.5–14.5)
GLOBULIN SER CALC-MCNC: 5.3 G/DL (ref 2–4)
GLUCOSE SERPL-MCNC: 86 MG/DL (ref 65–100)
GLUCOSE UR STRIP.AUTO-MCNC: NEGATIVE MG/DL
HCG UR QL: NEGATIVE
HCT VFR BLD AUTO: 40.1 % (ref 35–47)
HGB BLD-MCNC: 12.7 G/DL (ref 11.5–16)
HGB UR QL STRIP: ABNORMAL
IMM GRANULOCYTES # BLD AUTO: 0.1 K/UL (ref 0–0.04)
IMM GRANULOCYTES NFR BLD AUTO: 1 % (ref 0–0.5)
KETONES UR QL STRIP.AUTO: NEGATIVE MG/DL
LACTATE SERPL-SCNC: 0.9 MMOL/L (ref 0.4–2)
LEUKOCYTE ESTERASE UR QL STRIP.AUTO: ABNORMAL
LYMPHOCYTES # BLD: 3.3 K/UL (ref 0.8–3.5)
LYMPHOCYTES NFR BLD: 20 % (ref 12–49)
MCH RBC QN AUTO: 26.8 PG (ref 26–34)
MCHC RBC AUTO-ENTMCNC: 31.7 G/DL (ref 30–36.5)
MCV RBC AUTO: 84.6 FL (ref 80–99)
MONOCYTES # BLD: 1.7 K/UL (ref 0–1)
MONOCYTES NFR BLD: 10 % (ref 5–13)
NEUTS SEG # BLD: 11.4 K/UL (ref 1.8–8)
NEUTS SEG NFR BLD: 68 % (ref 32–75)
NITRITE UR QL STRIP.AUTO: NEGATIVE
NRBC # BLD: 0 K/UL (ref 0–0.01)
NRBC BLD-RTO: 0 PER 100 WBC
PH UR STRIP: 6 [PH] (ref 5–8)
PLATELET # BLD AUTO: 300 K/UL (ref 150–400)
PMV BLD AUTO: 10.6 FL (ref 8.9–12.9)
POTASSIUM SERPL-SCNC: ABNORMAL MMOL/L (ref 3.5–5.1)
PROT SERPL-MCNC: 8.8 G/DL (ref 6.4–8.2)
PROT UR STRIP-MCNC: NEGATIVE MG/DL
RBC # BLD AUTO: 4.74 M/UL (ref 3.8–5.2)
RBC #/AREA URNS HPF: ABNORMAL /HPF (ref 0–5)
SODIUM SERPL-SCNC: 137 MMOL/L (ref 136–145)
SP GR UR REFRACTOMETRY: 1.01 (ref 1–1.03)
UR CULT HOLD, URHOLD: NORMAL
UROBILINOGEN UR QL STRIP.AUTO: 1 EU/DL (ref 0.2–1)
WBC # BLD AUTO: 16.8 K/UL (ref 3.6–11)
WBC URNS QL MICRO: ABNORMAL /HPF (ref 0–4)

## 2022-08-27 PROCEDURE — 99285 EMERGENCY DEPT VISIT HI MDM: CPT

## 2022-08-27 PROCEDURE — 85025 COMPLETE CBC W/AUTO DIFF WBC: CPT

## 2022-08-27 PROCEDURE — 36415 COLL VENOUS BLD VENIPUNCTURE: CPT

## 2022-08-27 PROCEDURE — 80053 COMPREHEN METABOLIC PANEL: CPT

## 2022-08-27 PROCEDURE — 81025 URINE PREGNANCY TEST: CPT

## 2022-08-27 PROCEDURE — 74011000636 HC RX REV CODE- 636: Performed by: EMERGENCY MEDICINE

## 2022-08-27 PROCEDURE — 96374 THER/PROPH/DIAG INJ IV PUSH: CPT

## 2022-08-27 PROCEDURE — 83605 ASSAY OF LACTIC ACID: CPT

## 2022-08-27 PROCEDURE — 81001 URINALYSIS AUTO W/SCOPE: CPT

## 2022-08-27 PROCEDURE — 74011250636 HC RX REV CODE- 250/636: Performed by: EMERGENCY MEDICINE

## 2022-08-27 PROCEDURE — 74011250637 HC RX REV CODE- 250/637: Performed by: EMERGENCY MEDICINE

## 2022-08-27 PROCEDURE — 74177 CT ABD & PELVIS W/CONTRAST: CPT

## 2022-08-27 PROCEDURE — 96375 TX/PRO/DX INJ NEW DRUG ADDON: CPT

## 2022-08-27 RX ORDER — METRONIDAZOLE 500 MG/1
500 TABLET ORAL 3 TIMES DAILY
Qty: 30 TABLET | Refills: 0 | Status: SHIPPED | OUTPATIENT
Start: 2022-08-27 | End: 2022-09-06

## 2022-08-27 RX ORDER — ONDANSETRON 8 MG/1
8 TABLET, ORALLY DISINTEGRATING ORAL
Qty: 15 TABLET | Refills: 0 | Status: SHIPPED | OUTPATIENT
Start: 2022-08-27

## 2022-08-27 RX ORDER — ONDANSETRON 2 MG/ML
4 INJECTION INTRAMUSCULAR; INTRAVENOUS
Status: COMPLETED | OUTPATIENT
Start: 2022-08-27 | End: 2022-08-27

## 2022-08-27 RX ORDER — ACETAMINOPHEN 500 MG
1000 TABLET ORAL ONCE
Status: COMPLETED | OUTPATIENT
Start: 2022-08-27 | End: 2022-08-27

## 2022-08-27 RX ORDER — LEVOFLOXACIN 750 MG/1
750 TABLET ORAL DAILY
Qty: 10 TABLET | Refills: 0 | Status: SHIPPED | OUTPATIENT
Start: 2022-08-27

## 2022-08-27 RX ORDER — METRONIDAZOLE 250 MG/1
500 TABLET ORAL
Status: COMPLETED | OUTPATIENT
Start: 2022-08-27 | End: 2022-08-27

## 2022-08-27 RX ORDER — KETOROLAC TROMETHAMINE 30 MG/ML
30 INJECTION, SOLUTION INTRAMUSCULAR; INTRAVENOUS ONCE
Status: COMPLETED | OUTPATIENT
Start: 2022-08-27 | End: 2022-08-27

## 2022-08-27 RX ADMIN — ACETAMINOPHEN 1000 MG: 500 TABLET ORAL at 20:31

## 2022-08-27 RX ADMIN — METRONIDAZOLE 500 MG: 250 TABLET ORAL at 22:54

## 2022-08-27 RX ADMIN — IOPAMIDOL 100 ML: 755 INJECTION, SOLUTION INTRAVENOUS at 21:56

## 2022-08-27 RX ADMIN — ONDANSETRON HYDROCHLORIDE 4 MG: 2 SOLUTION INTRAMUSCULAR; INTRAVENOUS at 21:00

## 2022-08-27 RX ADMIN — LEVOFLOXACIN 750 MG: 250 TABLET, FILM COATED ORAL at 22:54

## 2022-08-27 RX ADMIN — KETOROLAC TROMETHAMINE 30 MG: 30 INJECTION, SOLUTION INTRAMUSCULAR at 22:54

## 2022-08-27 RX ADMIN — SODIUM CHLORIDE 1000 ML: 9 INJECTION, SOLUTION INTRAVENOUS at 21:00

## 2022-08-27 NOTE — ED TRIAGE NOTES
Emergency Room Nursing Note        Patient Name: Leanna Daugherty      : 1995             MRN: [id number]      Chief Complaint:  Abdominal Pain and Fever      Admit Diagnosis: No admission diagnoses are documented for this encounter. Admitting Provider: No admitting provider for patient encounter. Surgery: * No surgery found *           Patient arrived to the ER ambulatory from home with complaints of Left Lower Abdominal Pain & Fevers that started yesterday. Patient discloses a Hx of Gastroparesis, IBS, PCOS & Ashtma.          Lines:        Signed by: Rene Gutierrez RN, JOSÉ LUIS, BSN, VIA [facility]                                              2022 at 7:42 PM

## 2022-08-28 NOTE — ED PROVIDER NOTES
Abdominal Pain   This is a new problem. The current episode started yesterday. The problem occurs constantly. The problem has not changed since onset. The pain is located in the LLQ. The quality of the pain is sharp. The pain is severe. Associated symptoms include anorexia, a fever (this evening), nausea, constipation, frequency and back pain (pain radiated to lower and midb back). Pertinent negatives include no diarrhea, no flatus, no vomiting and no dysuria. The pain is worsened by palpation and certain positions. The pain is relieved by Nothing. Her past medical history is significant for ovarian cysts. Her past medical history does not include diverticulitis or kidney stones. The patient's surgical history non-contributory.      Past Medical History:   Diagnosis Date    Acid reflux     Asthma     Bladder incontinence     Depression     Endocrine disease     isulin resistance    Headache(784.0)     IBS (irritable bowel syndrome)     Interstitial cystitis     Irregular menstrual cycle     Vertigo        Past Surgical History:   Procedure Laterality Date    HX COLONOSCOPY           Family History:   Problem Relation Age of Onset    OSTEOARTHRITIS Mother     Asthma Mother     Headache Mother         migraines    Anxiety Mother     Hypertension Maternal Grandmother     Hypertension Maternal Grandfather     Cancer Maternal Grandfather         prostate    Hypertension Paternal Grandmother     Diabetes Paternal Grandmother     Thyroid Disease Paternal Grandmother     Hypertension Paternal Grandfather     Diabetes Paternal Grandfather     Thyroid Disease Paternal Grandfather        Social History     Socioeconomic History    Marital status: SINGLE     Spouse name: Not on file    Number of children: Not on file    Years of education: Not on file    Highest education level: Not on file   Occupational History    Not on file   Tobacco Use    Smoking status: Never    Smokeless tobacco: Never   Substance and Sexual Activity Alcohol use: Yes     Comment: rarely 2-4 if drinking    Drug use: No    Sexual activity: Never   Other Topics Concern    Not on file   Social History Narrative    ** Merged History Encounter **         ** Merged History Encounter **          Social Determinants of Health     Financial Resource Strain: Not on file   Food Insecurity: Not on file   Transportation Needs: Not on file   Physical Activity: Not on file   Stress: Not on file   Social Connections: Not on file   Intimate Partner Violence: Not on file   Housing Stability: Not on file         ALLERGIES: Latex, Latex, Banana, Kiwi, Nut flavor, Peanut, Strawberry, Tree nuts, Banana, and Kiwi    Review of Systems   Constitutional:  Positive for fever (this evening). Gastrointestinal:  Positive for abdominal pain, anorexia, constipation and nausea. Negative for diarrhea, flatus and vomiting. Genitourinary:  Positive for frequency. Negative for dysuria. Musculoskeletal:  Positive for back pain (pain radiated to lower and midb back). Vitals:    08/27/22 1938   BP: (!) 148/95   Pulse: (!) 107   Resp: 20   Temp: (!) 101.6 °F (38.7 °C)   SpO2: 94%   Weight: 107.8 kg (237 lb 10.5 oz)   Height: 5' 6\" (1.676 m)            Physical Exam  Vitals and nursing note reviewed. Constitutional:       Appearance: She is well-developed. HENT:      Head: Normocephalic and atraumatic. Eyes:      Pupils: Pupils are equal, round, and reactive to light. Cardiovascular:      Rate and Rhythm: Normal rate and regular rhythm. Pulmonary:      Effort: Pulmonary effort is normal.      Breath sounds: Normal breath sounds. Abdominal:      General: There is no distension. Palpations: Abdomen is soft. Tenderness: There is abdominal tenderness in the left lower quadrant. Musculoskeletal:      Cervical back: Normal range of motion and neck supple. Skin:     General: Skin is warm and dry. Capillary Refill: Capillary refill takes less than 2 seconds. Neurological:      General: No focal deficit present. Mental Status: She is alert and oriented to person, place, and time. Psychiatric:         Mood and Affect: Mood normal.         Behavior: Behavior normal.        MDM         Procedures      MEDICAL DECISION MAKIN y.o. female presents with Abdominal Pain and Fever    The patient is resting comfortably and feels better, is alert and in no distress. The repeat examination is unremarkable and benign; in particular, there is no discomfort at McBurney's point. The history, exam, diagnostic testing, and current condition do not suggest acute appendicitis, bowel obstruction, incarcerated hernia, acute cholecystitis, bowel perforation, major gastrointestinal bleeding, severe diverticulitis, severe sepsis, or other significant pathology to warrant further testing, continued ED treatment, admission, or surgical evaluation at this point. The vital signs have been stable and are within normal limits at this time. The patient does not have uncontrollable pain, intractable vomiting, or other significant symptoms. The patient's condition is stable and appropriate for discharge. The patient will pursue further outpatient evaluation with the primary care physician or other designated or consulting physician as indicated in the discharge instructions. LABORATORY TESTS:  Labs Reviewed   CBC WITH AUTOMATED DIFF - Abnormal; Notable for the following components:       Result Value    WBC 16.8 (*)     IMMATURE GRANULOCYTES 1 (*)     ABS. NEUTROPHILS 11.4 (*)     ABS. MONOCYTES 1.7 (*)     ABS. IMM.  GRANS. 0.1 (*)     All other components within normal limits   METABOLIC PANEL, COMPREHENSIVE - Abnormal; Notable for the following components:    Protein, total 8.8 (*)     Globulin 5.3 (*)     A-G Ratio 0.7 (*)     All other components within normal limits   URINALYSIS W/MICROSCOPIC - Abnormal; Notable for the following components:    Appearance CLOUDY (*)     Blood TRACE (*)     Leukocyte Esterase LARGE (*)     Epithelial cells MODERATE (*)     Bacteria 2+ (*)     All other components within normal limits   URINE CULTURE HOLD SAMPLE   LACTIC ACID   SAMPLES BEING HELD   HCG URINE, QL. - POC       IMAGING RESULTS:  CT ABD PELV W CONT   Final Result   Transitional minimal colitis effectiveness sigmoid colon. Consider infectious and inflammatory etiologies. Please see above for additional nonemergent incidental findings. MEDICATIONS GIVEN:  Medications   sodium chloride 0.9 % bolus infusion 1,000 mL (0 mL IntraVENous IV Completed 8/27/22 2310)   acetaminophen (TYLENOL) tablet 1,000 mg (1,000 mg Oral Given 8/27/22 2031)   ondansetron (ZOFRAN) injection 4 mg (4 mg IntraVENous Given 8/27/22 2100)   iopamidoL (ISOVUE-370) 76 % injection 100 mL (100 mL IntraVENous Given 8/27/22 2156)   levoFLOXacin (LEVAQUIN) tablet 750 mg (750 mg Oral Given 8/27/22 2254)   metroNIDAZOLE (FLAGYL) tablet 500 mg (500 mg Oral Given 8/27/22 2254)   ketorolac (TORADOL) injection 30 mg (30 mg IntraVENous Given 8/27/22 2254)       Discharge Medication List as of 8/27/2022 11:08 PM        START taking these medications    Details   metroNIDAZOLE (FlagyL) 500 mg tablet Take 1 Tablet by mouth three (3) times daily for 10 days. , Normal, Disp-30 Tablet, R-0      levoFLOXacin (LEVAQUIN) 750 mg tablet Take 1 Tablet by mouth daily. , Normal, Disp-10 Tablet, R-0           CONTINUE these medications which have CHANGED    Details   ondansetron (ZOFRAN ODT) 8 mg disintegrating tablet Take 1 Tablet by mouth every eight (8) hours as needed for Nausea or Vomiting., Normal, Disp-15 Tablet, R-0           CONTINUE these medications which have NOT CHANGED    Details   mv-calcium-min-iron fm-FA-vitK (One-A-Day Women's Complete) 18 mg-400 mcg- 25 mcg tab Take 1 Tablet by mouth daily. , Historical Med      cholecalciferol (VITAMIN D3) (1000 Units /25 mcg) tablet Take 1,000 Units by mouth daily. , Historical Med cyanocobalamin, vitamin B-12, 5,000 mcg TbIE Take 5,000 mcg by mouth daily. , Historical Med      ibuprofen (MOTRIN) 600 mg tablet Take 1 Tab by mouth every eight (8) hours as needed for Pain (take with food). , Print, Disp-20 Tab,R-0      albuterol (PROVENTIL HFA, VENTOLIN HFA, PROAIR HFA) 90 mcg/actuation inhaler Take 2 Puffs by inhalation every four (4) hours as needed for Wheezing., Print, Disp-1 Inhaler, R-0           Follow-up Information       Follow up With Specialties Details Why Contact Info    Alicia Bejarano MD Family Medicine, Pediatric Medicine Schedule an appointment as soon as possible for a visit in 3 days  [address] [place] [address]              Justin Gusman MD      Please note that this dictation was completed with eXen[employer], the computer voice recognition software. Quite often unanticipated grammatical, syntax, homophones, and other interpretive errors are inadvertently transcribed by the computer software. Please disregard these errors. Please excuse any errors that have escaped final proofreading.

## 2022-08-31 ENCOUNTER — OFFICE VISIT (OUTPATIENT)
Dept: FAMILY MEDICINE CLINIC | Age: 27
End: 2022-08-31

## 2022-08-31 ENCOUNTER — LAB ONLY (OUTPATIENT)
Dept: FAMILY MEDICINE CLINIC | Age: 27
End: 2022-08-31

## 2022-08-31 VITALS
OXYGEN SATURATION: 98 % | SYSTOLIC BLOOD PRESSURE: 134 MMHG | DIASTOLIC BLOOD PRESSURE: 82 MMHG | HEIGHT: 66 IN | RESPIRATION RATE: 16 BRPM | WEIGHT: 230 LBS | HEART RATE: 77 BPM | TEMPERATURE: 97.8 F | BODY MASS INDEX: 36.96 KG/M2

## 2022-08-31 DIAGNOSIS — K52.9 COLITIS: ICD-10-CM

## 2022-08-31 DIAGNOSIS — R10.9 ABDOMINAL PAIN, UNSPECIFIED ABDOMINAL LOCATION: ICD-10-CM

## 2022-08-31 DIAGNOSIS — D72.829 LEUKOCYTOSIS, UNSPECIFIED TYPE: ICD-10-CM

## 2022-08-31 DIAGNOSIS — K52.9 COLITIS: Primary | ICD-10-CM

## 2022-08-31 PROCEDURE — 99214 OFFICE O/P EST MOD 30 MIN: CPT | Performed by: INTERNAL MEDICINE

## 2022-08-31 RX ORDER — PROCHLORPERAZINE MALEATE 10 MG
TABLET ORAL
COMMUNITY
Start: 2022-07-29

## 2022-08-31 NOTE — PROGRESS NOTES
Identified pt with two pt identifiers(name and ).     Chief Complaint   Patient presents with    ED Follow-up     SPT ER 22 sepsis/ colitis    Nausea    Bloated    Constipation        Health Maintenance Due   Topic    HPV Age 9Y-34Y (2 - 3-dose series)    COVID-19 Vaccine (3 - Booster for Pfizer series)    Pap Smear        Wt Readings from Last 3 Encounters:   22 230 lb (104.3 kg)   22 237 lb 10.5 oz (107.8 kg)   22 234 lb (106.1 kg)     Temp Readings from Last 3 Encounters:   22 97.8 °F (36.6 °C) (Temporal)   22 98.5 °F (36.9 °C)   22 98.8 °F (37.1 °C) (Temporal)     BP Readings from Last 3 Encounters:   22 134/82   22 124/73   22 122/78     Pulse Readings from Last 3 Encounters:   22 77   22 97   22 86         Learning Assessment:  :     Learning Assessment 10/9/2018   PRIMARY LEARNER Patient   HIGHEST LEVEL OF EDUCATION - PRIMARY LEARNER  [employer]   BARRIERS PRIMARY LEARNER NONE   CO-LEARNER CAREGIVER No   PRIMARY LANGUAGE ENGLISH   LEARNER PREFERENCE PRIMARY READING   ANSWERED BY Miryam Lopez   RELATIONSHIP SELF       Depression Screening:  :     3 most recent PHQ Screens 2022   Little interest or pleasure in doing things Several days   Feeling down, depressed, irritable, or hopeless Nearly every day   Total Score PHQ 2 4   Trouble falling or staying asleep, or sleeping too much Several days   Feeling tired or having little energy Several days   Poor appetite, weight loss, or overeating Not at all   Feeling bad about yourself - or that you are a failure or have let yourself or your family down Several days   Trouble concentrating on things such as school, work, reading, or watching TV Several days   Moving or speaking so slowly that other people could have noticed; or the opposite being so fidgety that others notice Not at all   Thoughts of being better off dead, or hurting yourself in some way Not at all   PHQ 9 Score 8   How difficult have these problems made it for you to do your work, take care of your home and get along with others Not difficult at all       Fall Risk Assessment:  :     No flowsheet data found. Abuse Screening:  :     Abuse Screening Questionnaire 6/8/2022 3/30/2021 10/9/2018   Do you ever feel afraid of your partner? N N N   Are you in a relationship with someone who physically or mentally threatens you? N N N   Is it safe for you to go home? May Darcy       Coordination of Care Questionnaire:  :     1) Have you been to an emergency room, urgent care clinic since your last visit? Yes [facility] 8/27/22  Hospitalized since your last visit? no             2) Have you seen or consulted any other health care providers outside of [address] [place] [address] since your last visit? no  (Include any pap smears or colon screenings in this section.)    3) Do you have an Advance Directive on file? yes  Are you interested in receiving information about Advance Directives? no    4. For patients aged 39-70: Has the patient had a colonoscopy / FIT/ Cologuard? NA - based on age      If the patient is female:    11. For patients aged 41-77: Has the patient had a mammogram within the past 2 years? NA - based on age or sex      10. For patients aged 21-65: Has the patient had a pap smear? Yes - Care Gap present.  Rooming MA/LPN to request most recent results [facility]

## 2022-08-31 NOTE — PROGRESS NOTES
Chief Complaint   Patient presents with    ED Follow-up     SPT ER 8/27/22 sepsis/ colitis    Nausea    Bloated    Constipation       Assessment/ Plan:   Diagnoses and all orders for this visit:    1. Colitis  -     METABOLIC PANEL, COMPREHENSIVE; Future  -     CBC WITH AUTOMATED DIFF; Future    2. Leukocytosis, unspecified type  -     CBC WITH AUTOMATED DIFF; Future    3. Abdominal pain, unspecified abdominal location  -     LIPASE; Future     I have discussed the diagnosis with the patient and the intended treatment plan as seen in the above orders. The patient has received an after-visit summary and questions were answered concerning future plans. Asked to return should symptoms worsen or not improve with treatment. Any pending labs and studies will be relayed to patient when they become available. Pt verbalizes understanding of plan of care and denies further questions or concerns at this time. Follow-up and Dispositions    Return if symptoms worsen or fail to improve. Subjective:   Tasha Colon is a 24 y.o. female who presents for follow up after being seen in the ER and diagnosed with colitis. Started on Levo and Flagyl. She was noted to have very elevated WBC in addition to abdominal pain with nausea. She is only able to take Ensure shakes and has not had a bowel movement for the past 4-days. She does have a GI doctor - Dr. Terrance Johnson and will schedule an appointment for follow up. She reports that there has been a family h/o UC and she just found this out from her GM recently. She does need some labs repeated today. In general, she is feeling a little better. No fevers or chills. But, still decreased appetite. CT Results (most recent):  Results from Hospital Encounter encounter on 08/27/22    CT ABD PELV W CONT    Narrative  CLINICAL HISTORY: llq tenderness + sepsis  INDICATION: llq tenderness + sepsis  COMPARISON: 2018.   CONTRAST: 100  ml Isovue 370  TECHNIQUE:  Multislice helical CT was performed of the abdomen and pelvis following  uneventful rapid bolus intravenous contrast administration. Oral contrast was  not administered. Contiguous 5 mm axial images were reconstructed and lung and  soft tissue windows were generated. Coronal and sagittal reformations were  generated. CT dose reduction was achieved through use of a standardized  protocol tailored for this examination and automatic exposure control for dose  modulation. FINDINGS:  LUNG BASES:No mass lesion or effusion. LIVER: Hepatic steatosis There is no intrahepatic duct dilatation. There is no  hepatic parenchymal mass. Hepatic enhancement pattern is within normal limits. Portal vein is patent. GALLBLADDER:  No dilatation or wall thickening. SPLEEN/PANCREAS: No mass. There is no pancreatic duct dilatation. There is no  evidence of splenomegaly. ADRENALS/KIDNEYS: No mass. There is no hydronephrosis. There is no renal mass. There is no perinephric mass. STOMACH: No dilatation or wall thickening. COLON AND SMALL BOWEL: Fecal stasis is moderate. There is mild inflammatory  stranding associated with the sigmoid colon There is no free intraperitoneal  air. There is no evidence of incarceration or obstruction. No mesenteric  adenopathy. PERITONEUM: Unremarkable. APPENDIX: Unremarkable. BLADDER/REPRODUCTIVE ORGANS: Bladder is nondistended. RETROPERITONEUM: Unremarkable. The abdominal aorta is normal in caliber. No  aneurysm. No retroperitoneal adenopathy. OSSEOUS STRUCTURES: No destructive bone lesion. Impression  Transitional minimal colitis effectiveness sigmoid colon. Consider infectious and inflammatory etiologies. Please see above for additional nonemergent incidental findings. Today:   Needs repeat labs. She is better/not feeling better. HISTORICAL  PMH, PSH, FHX, SOCHX, ALLERGIES and MES were reviewed and updated today.       Review of Systems  Review of Systems   Gastrointestinal:  Positive for abdominal pain, constipation and nausea. All other systems reviewed and are negative. Objective:     Vitals:    08/31/22 1017   BP: 134/82   Pulse: 77   Resp: 16   Temp: 97.8 °F (36.6 °C)   TempSrc: Temporal   SpO2: 98%   Weight: 230 lb (104.3 kg)   Height: 5' 6\" (1.676 m)     Physical Exam  Vitals and nursing note reviewed. Constitutional:       Appearance: Normal appearance. HENT:      Head: Normocephalic and atraumatic. Mouth/Throat:      Mouth: Mucous membranes are moist.   Eyes:      Extraocular Movements: Extraocular movements intact. Conjunctiva/sclera: Conjunctivae normal.      Pupils: Pupils are equal, round, and reactive to light. Cardiovascular:      Rate and Rhythm: Normal rate and regular rhythm. Pulses: Normal pulses. Heart sounds: Normal heart sounds. Pulmonary:      Effort: Pulmonary effort is normal.      Breath sounds: Normal breath sounds. Musculoskeletal:      Cervical back: Normal range of motion and neck supple. Neurological:      General: No focal deficit present. Mental Status: She is alert. Mental status is at baseline. Cranial Nerves: No cranial nerve deficit. Sensory: No sensory deficit. Motor: No weakness. Coordination: Coordination normal.      Gait: Gait normal.      Deep Tendon Reflexes: Reflexes normal.   Psychiatric:         Mood and Affect: Mood normal.         Behavior: Behavior normal.         Thought Content:  Thought content normal.         Judgment: Judgment normal.     Rip Zhou MD  [facility]   08/31/22

## 2022-09-01 LAB
ALBUMIN SERPL-MCNC: 3.6 G/DL (ref 3.5–5)
ALBUMIN/GLOB SERPL: 0.8 {RATIO} (ref 1.1–2.2)
ALP SERPL-CCNC: 87 U/L (ref 45–117)
ALT SERPL-CCNC: 25 U/L (ref 12–78)
ANION GAP SERPL CALC-SCNC: 6 MMOL/L (ref 5–15)
AST SERPL-CCNC: 15 U/L (ref 15–37)
BASOPHILS # BLD: 0 K/UL (ref 0–0.1)
BASOPHILS NFR BLD: 1 % (ref 0–1)
BILIRUB SERPL-MCNC: 0.4 MG/DL (ref 0.2–1)
BUN SERPL-MCNC: 16 MG/DL (ref 6–20)
BUN/CREAT SERPL: 19 (ref 12–20)
CALCIUM SERPL-MCNC: 9.4 MG/DL (ref 8.5–10.1)
CHLORIDE SERPL-SCNC: 104 MMOL/L (ref 97–108)
CO2 SERPL-SCNC: 27 MMOL/L (ref 21–32)
CREAT SERPL-MCNC: 0.85 MG/DL (ref 0.55–1.02)
DIFFERENTIAL METHOD BLD: NORMAL
EOSINOPHIL # BLD: 0.2 K/UL (ref 0–0.4)
EOSINOPHIL NFR BLD: 3 % (ref 0–7)
ERYTHROCYTE [DISTWIDTH] IN BLOOD BY AUTOMATED COUNT: 13.2 % (ref 11.5–14.5)
GLOBULIN SER CALC-MCNC: 4.3 G/DL (ref 2–4)
GLUCOSE SERPL-MCNC: 94 MG/DL (ref 65–100)
HCT VFR BLD AUTO: 40.1 % (ref 35–47)
HGB BLD-MCNC: 12.6 G/DL (ref 11.5–16)
IMM GRANULOCYTES # BLD AUTO: 0 K/UL (ref 0–0.04)
IMM GRANULOCYTES NFR BLD AUTO: 0 % (ref 0–0.5)
LIPASE SERPL-CCNC: 80 U/L (ref 73–393)
LYMPHOCYTES # BLD: 3.1 K/UL (ref 0.8–3.5)
LYMPHOCYTES NFR BLD: 37 % (ref 12–49)
MCH RBC QN AUTO: 27.3 PG (ref 26–34)
MCHC RBC AUTO-ENTMCNC: 31.4 G/DL (ref 30–36.5)
MCV RBC AUTO: 87 FL (ref 80–99)
MONOCYTES # BLD: 0.9 K/UL (ref 0–1)
MONOCYTES NFR BLD: 11 % (ref 5–13)
NEUTS SEG # BLD: 4.3 K/UL (ref 1.8–8)
NEUTS SEG NFR BLD: 48 % (ref 32–75)
NRBC # BLD: 0 K/UL (ref 0–0.01)
NRBC BLD-RTO: 0 PER 100 WBC
PLATELET # BLD AUTO: 370 K/UL (ref 150–400)
PMV BLD AUTO: 11.1 FL (ref 8.9–12.9)
POTASSIUM SERPL-SCNC: 4.8 MMOL/L (ref 3.5–5.1)
PROT SERPL-MCNC: 7.9 G/DL (ref 6.4–8.2)
RBC # BLD AUTO: 4.61 M/UL (ref 3.8–5.2)
SODIUM SERPL-SCNC: 137 MMOL/L (ref 136–145)
WBC # BLD AUTO: 8.6 K/UL (ref 3.6–11)

## 2023-04-14 ENCOUNTER — TELEPHONE (OUTPATIENT)
Dept: FAMILY MEDICINE CLINIC | Age: 28
End: 2023-04-14

## 2023-07-19 ENCOUNTER — TELEMEDICINE (OUTPATIENT)
Age: 28
End: 2023-07-19
Payer: COMMERCIAL

## 2023-07-19 DIAGNOSIS — J06.9 URI WITH COUGH AND CONGESTION: Primary | ICD-10-CM

## 2023-07-19 PROCEDURE — 99213 OFFICE O/P EST LOW 20 MIN: CPT | Performed by: NURSE PRACTITIONER

## 2023-07-19 RX ORDER — ALBUTEROL SULFATE 90 UG/1
2 AEROSOL, METERED RESPIRATORY (INHALATION) EVERY 4 HOURS PRN
Qty: 18 G | Refills: 2 | Status: SHIPPED | OUTPATIENT
Start: 2023-07-19

## 2023-07-19 RX ORDER — AZITHROMYCIN 250 MG/1
250 TABLET, FILM COATED ORAL SEE ADMIN INSTRUCTIONS
Qty: 6 TABLET | Refills: 0 | Status: SHIPPED | OUTPATIENT
Start: 2023-07-19 | End: 2023-07-24

## 2023-07-19 ASSESSMENT — ENCOUNTER SYMPTOMS
WHEEZING: 1
COUGH: 1

## 2023-10-31 ENCOUNTER — OFFICE VISIT (OUTPATIENT)
Age: 28
End: 2023-10-31
Payer: COMMERCIAL

## 2023-10-31 ENCOUNTER — NURSE ONLY (OUTPATIENT)
Age: 28
End: 2023-10-31

## 2023-10-31 VITALS
HEIGHT: 66 IN | BODY MASS INDEX: 37.45 KG/M2 | HEART RATE: 77 BPM | DIASTOLIC BLOOD PRESSURE: 76 MMHG | WEIGHT: 233 LBS | SYSTOLIC BLOOD PRESSURE: 128 MMHG | OXYGEN SATURATION: 97 % | TEMPERATURE: 97.7 F | RESPIRATION RATE: 18 BRPM

## 2023-10-31 DIAGNOSIS — R53.83 MALAISE AND FATIGUE: ICD-10-CM

## 2023-10-31 DIAGNOSIS — R20.2 PARESTHESIA: ICD-10-CM

## 2023-10-31 DIAGNOSIS — Z13.1 SCREENING FOR DIABETES MELLITUS: ICD-10-CM

## 2023-10-31 DIAGNOSIS — R55 SYNCOPE, UNSPECIFIED SYNCOPE TYPE: ICD-10-CM

## 2023-10-31 DIAGNOSIS — R42 LIGHTHEADEDNESS: ICD-10-CM

## 2023-10-31 DIAGNOSIS — R42 DIZZINESS: ICD-10-CM

## 2023-10-31 DIAGNOSIS — Z00.00 WELL WOMAN EXAM (NO GYNECOLOGICAL EXAM): Primary | ICD-10-CM

## 2023-10-31 DIAGNOSIS — R53.81 MALAISE AND FATIGUE: ICD-10-CM

## 2023-10-31 PROCEDURE — 99213 OFFICE O/P EST LOW 20 MIN: CPT | Performed by: INTERNAL MEDICINE

## 2023-10-31 PROCEDURE — 99395 PREV VISIT EST AGE 18-39: CPT | Performed by: INTERNAL MEDICINE

## 2023-10-31 SDOH — ECONOMIC STABILITY: HOUSING INSECURITY
IN THE LAST 12 MONTHS, WAS THERE A TIME WHEN YOU DID NOT HAVE A STEADY PLACE TO SLEEP OR SLEPT IN A SHELTER (INCLUDING NOW)?: NO

## 2023-10-31 SDOH — ECONOMIC STABILITY: FOOD INSECURITY: WITHIN THE PAST 12 MONTHS, YOU WORRIED THAT YOUR FOOD WOULD RUN OUT BEFORE YOU GOT MONEY TO BUY MORE.: NEVER TRUE

## 2023-10-31 SDOH — ECONOMIC STABILITY: INCOME INSECURITY: HOW HARD IS IT FOR YOU TO PAY FOR THE VERY BASICS LIKE FOOD, HOUSING, MEDICAL CARE, AND HEATING?: NOT HARD AT ALL

## 2023-10-31 SDOH — ECONOMIC STABILITY: FOOD INSECURITY: WITHIN THE PAST 12 MONTHS, THE FOOD YOU BOUGHT JUST DIDN'T LAST AND YOU DIDN'T HAVE MONEY TO GET MORE.: NEVER TRUE

## 2023-10-31 ASSESSMENT — PATIENT HEALTH QUESTIONNAIRE - PHQ9
3. TROUBLE FALLING OR STAYING ASLEEP: 2
8. MOVING OR SPEAKING SO SLOWLY THAT OTHER PEOPLE COULD HAVE NOTICED. OR THE OPPOSITE, BEING SO FIGETY OR RESTLESS THAT YOU HAVE BEEN MOVING AROUND A LOT MORE THAN USUAL: 2
10. IF YOU CHECKED OFF ANY PROBLEMS, HOW DIFFICULT HAVE THESE PROBLEMS MADE IT FOR YOU TO DO YOUR WORK, TAKE CARE OF THINGS AT HOME, OR GET ALONG WITH OTHER PEOPLE: 0
9. THOUGHTS THAT YOU WOULD BE BETTER OFF DEAD, OR OF HURTING YOURSELF: 0
6. FEELING BAD ABOUT YOURSELF - OR THAT YOU ARE A FAILURE OR HAVE LET YOURSELF OR YOUR FAMILY DOWN: 1
5. POOR APPETITE OR OVEREATING: 2
7. TROUBLE CONCENTRATING ON THINGS, SUCH AS READING THE NEWSPAPER OR WATCHING TELEVISION: 1
SUM OF ALL RESPONSES TO PHQ QUESTIONS 1-9: 14
1. LITTLE INTEREST OR PLEASURE IN DOING THINGS: 2
SUM OF ALL RESPONSES TO PHQ QUESTIONS 1-9: 14
2. FEELING DOWN, DEPRESSED OR HOPELESS: 2
4. FEELING TIRED OR HAVING LITTLE ENERGY: 2
SUM OF ALL RESPONSES TO PHQ9 QUESTIONS 1 & 2: 4

## 2023-11-01 LAB
ALBUMIN SERPL-MCNC: 3.8 G/DL (ref 3.5–5)
ALBUMIN/GLOB SERPL: 0.8 (ref 1.1–2.2)
ALP SERPL-CCNC: 98 U/L (ref 45–117)
ALT SERPL-CCNC: 69 U/L (ref 12–78)
ANION GAP SERPL CALC-SCNC: 3 MMOL/L (ref 5–15)
AST SERPL-CCNC: 28 U/L (ref 15–37)
BASOPHILS # BLD: 0.1 K/UL (ref 0–0.1)
BASOPHILS NFR BLD: 1 % (ref 0–1)
BILIRUB SERPL-MCNC: 0.3 MG/DL (ref 0.2–1)
BUN SERPL-MCNC: 11 MG/DL (ref 6–20)
BUN/CREAT SERPL: 17 (ref 12–20)
CALCIUM SERPL-MCNC: 9.1 MG/DL (ref 8.5–10.1)
CHLORIDE SERPL-SCNC: 103 MMOL/L (ref 97–108)
CO2 SERPL-SCNC: 28 MMOL/L (ref 21–32)
CREAT SERPL-MCNC: 0.65 MG/DL (ref 0.55–1.02)
DIFFERENTIAL METHOD BLD: NORMAL
EOSINOPHIL # BLD: 0.1 K/UL (ref 0–0.4)
EOSINOPHIL NFR BLD: 2 % (ref 0–7)
ERYTHROCYTE [DISTWIDTH] IN BLOOD BY AUTOMATED COUNT: 13.3 % (ref 11.5–14.5)
EST. AVERAGE GLUCOSE BLD GHB EST-MCNC: 103 MG/DL
GLOBULIN SER CALC-MCNC: 4.6 G/DL (ref 2–4)
GLUCOSE SERPL-MCNC: 89 MG/DL (ref 65–100)
HBA1C MFR BLD: 5.2 % (ref 4–5.6)
HCT VFR BLD AUTO: 43.9 % (ref 35–47)
HGB BLD-MCNC: 13.2 G/DL (ref 11.5–16)
IMM GRANULOCYTES # BLD AUTO: 0 K/UL (ref 0–0.04)
IMM GRANULOCYTES NFR BLD AUTO: 0 % (ref 0–0.5)
LYMPHOCYTES # BLD: 3 K/UL (ref 0.8–3.5)
LYMPHOCYTES NFR BLD: 34 % (ref 12–49)
MAGNESIUM SERPL-MCNC: 2.2 MG/DL (ref 1.6–2.4)
MCH RBC QN AUTO: 26.5 PG (ref 26–34)
MCHC RBC AUTO-ENTMCNC: 30.1 G/DL (ref 30–36.5)
MCV RBC AUTO: 88.2 FL (ref 80–99)
MONOCYTES # BLD: 0.9 K/UL (ref 0–1)
MONOCYTES NFR BLD: 11 % (ref 5–13)
NEUTS SEG # BLD: 4.7 K/UL (ref 1.8–8)
NEUTS SEG NFR BLD: 52 % (ref 32–75)
NRBC # BLD: 0 K/UL (ref 0–0.01)
NRBC BLD-RTO: 0 PER 100 WBC
PLATELET # BLD AUTO: 367 K/UL (ref 150–400)
PMV BLD AUTO: 11.7 FL (ref 8.9–12.9)
POTASSIUM SERPL-SCNC: 4.6 MMOL/L (ref 3.5–5.1)
PROT SERPL-MCNC: 8.4 G/DL (ref 6.4–8.2)
RBC # BLD AUTO: 4.98 M/UL (ref 3.8–5.2)
SODIUM SERPL-SCNC: 134 MMOL/L (ref 136–145)
T4 FREE SERPL-MCNC: 0.8 NG/DL (ref 0.8–1.5)
TSH SERPL DL<=0.05 MIU/L-ACNC: 2.36 UIU/ML (ref 0.36–3.74)
VIT B12 SERPL-MCNC: 674 PG/ML (ref 193–986)
WBC # BLD AUTO: 8.8 K/UL (ref 3.6–11)

## 2023-11-03 LAB — METHYLMALONATE SERPL-SCNC: 80 NMOL/L (ref 0–378)

## 2023-11-09 ENCOUNTER — HOSPITAL ENCOUNTER (OUTPATIENT)
Facility: HOSPITAL | Age: 28
Discharge: HOME | End: 2023-11-09
Attending: INTERNAL MEDICINE
Payer: COMMERCIAL

## 2023-11-09 DIAGNOSIS — R20.2 PARESTHESIA: ICD-10-CM

## 2023-11-09 DIAGNOSIS — R42 DIZZINESS: ICD-10-CM

## 2023-11-09 DIAGNOSIS — R42 LIGHTHEADEDNESS: ICD-10-CM

## 2023-11-09 PROCEDURE — 6360000004 HC RX CONTRAST MEDICATION: Performed by: INTERNAL MEDICINE

## 2023-11-09 PROCEDURE — 70470 CT HEAD/BRAIN W/O & W/DYE: CPT

## 2023-11-09 RX ADMIN — IOPAMIDOL 100 ML: 755 INJECTION, SOLUTION INTRAVENOUS at 08:10

## 2023-11-12 DIAGNOSIS — R06.09 DYSPNEA ON EXERTION: ICD-10-CM

## 2023-11-12 DIAGNOSIS — R42 DIZZINESS: ICD-10-CM

## 2023-11-12 DIAGNOSIS — R42 LIGHTHEADEDNESS: Primary | ICD-10-CM

## 2023-11-12 DIAGNOSIS — R53.1 WEAKNESS: ICD-10-CM

## 2023-11-13 ENCOUNTER — TELEPHONE (OUTPATIENT)
Age: 28
End: 2023-11-13

## 2023-11-13 NOTE — TELEPHONE ENCOUNTER
Pt will be dropping off letter from her job that needs to be filled out. She brought it to her last appt but apparently it doesn't need a diagnosis they just want a doctors signature.

## 2023-11-27 ENCOUNTER — HOSPITAL ENCOUNTER (INPATIENT)
Facility: HOSPITAL | Age: 28
LOS: 2 days | Discharge: HOME | DRG: 552 | End: 2023-11-29
Attending: STUDENT IN AN ORGANIZED HEALTH CARE EDUCATION/TRAINING PROGRAM | Admitting: FAMILY MEDICINE
Payer: COMMERCIAL

## 2023-11-27 DIAGNOSIS — R53.1 WEAKNESS: ICD-10-CM

## 2023-11-27 DIAGNOSIS — M62.81 MUSCULAR WEAKNESS: Primary | ICD-10-CM

## 2023-11-27 DIAGNOSIS — R55 SYNCOPE AND COLLAPSE: ICD-10-CM

## 2023-11-27 LAB
ALBUMIN SERPL-MCNC: 3.7 G/DL (ref 3.5–5)
ALBUMIN/GLOB SERPL: 0.9 (ref 1.1–2.2)
ALP SERPL-CCNC: 95 U/L (ref 45–117)
ALT SERPL-CCNC: 59 U/L (ref 12–78)
ANION GAP SERPL CALC-SCNC: 1 MMOL/L (ref 5–15)
AST SERPL-CCNC: 30 U/L (ref 15–37)
BASOPHILS # BLD: 0 K/UL (ref 0–0.1)
BASOPHILS NFR BLD: 0 % (ref 0–1)
BILIRUB SERPL-MCNC: 0.4 MG/DL (ref 0.2–1)
BUN SERPL-MCNC: 15 MG/DL (ref 6–20)
BUN/CREAT SERPL: 20 (ref 12–20)
CALCIUM SERPL-MCNC: 9.4 MG/DL (ref 8.5–10.1)
CHLORIDE SERPL-SCNC: 107 MMOL/L (ref 97–108)
CK SERPL-CCNC: 107 U/L (ref 26–192)
CO2 SERPL-SCNC: 31 MMOL/L (ref 21–32)
COMMENT:: NORMAL
CREAT SERPL-MCNC: 0.76 MG/DL (ref 0.55–1.02)
CRP SERPL-MCNC: 1.43 MG/DL (ref 0–0.6)
DIFFERENTIAL METHOD BLD: NORMAL
EOSINOPHIL # BLD: 0.1 K/UL (ref 0–0.4)
EOSINOPHIL NFR BLD: 2 % (ref 0–7)
ERYTHROCYTE [DISTWIDTH] IN BLOOD BY AUTOMATED COUNT: 13.2 % (ref 11.5–14.5)
ERYTHROCYTE [SEDIMENTATION RATE] IN BLOOD: 35 MM/HR (ref 0–20)
GLOBULIN SER CALC-MCNC: 4.3 G/DL (ref 2–4)
GLUCOSE SERPL-MCNC: 96 MG/DL (ref 65–100)
HCT VFR BLD AUTO: 41 % (ref 35–47)
HGB BLD-MCNC: 12.8 G/DL (ref 11.5–16)
IMM GRANULOCYTES # BLD AUTO: 0 K/UL (ref 0–0.04)
IMM GRANULOCYTES NFR BLD AUTO: 0 % (ref 0–0.5)
LYMPHOCYTES # BLD: 3.1 K/UL (ref 0.8–3.5)
LYMPHOCYTES NFR BLD: 37 % (ref 12–49)
MCH RBC QN AUTO: 26.5 PG (ref 26–34)
MCHC RBC AUTO-ENTMCNC: 31.2 G/DL (ref 30–36.5)
MCV RBC AUTO: 84.9 FL (ref 80–99)
MONOCYTES # BLD: 1 K/UL (ref 0–1)
MONOCYTES NFR BLD: 12 % (ref 5–13)
NEUTS SEG # BLD: 4.1 K/UL (ref 1.8–8)
NEUTS SEG NFR BLD: 49 % (ref 32–75)
NRBC # BLD: 0 K/UL (ref 0–0.01)
NRBC BLD-RTO: 0 PER 100 WBC
PLATELET # BLD AUTO: 388 K/UL (ref 150–400)
PMV BLD AUTO: 11 FL (ref 8.9–12.9)
POTASSIUM SERPL-SCNC: 4.4 MMOL/L (ref 3.5–5.1)
PROT SERPL-MCNC: 8 G/DL (ref 6.4–8.2)
RBC # BLD AUTO: 4.83 M/UL (ref 3.8–5.2)
SODIUM SERPL-SCNC: 139 MMOL/L (ref 136–145)
SPECIMEN HOLD: NORMAL
WBC # BLD AUTO: 8.4 K/UL (ref 3.6–11)

## 2023-11-27 PROCEDURE — 2580000003 HC RX 258

## 2023-11-27 PROCEDURE — 36415 COLL VENOUS BLD VENIPUNCTURE: CPT

## 2023-11-27 PROCEDURE — 86140 C-REACTIVE PROTEIN: CPT

## 2023-11-27 PROCEDURE — 99285 EMERGENCY DEPT VISIT HI MDM: CPT

## 2023-11-27 PROCEDURE — 82550 ASSAY OF CK (CPK): CPT

## 2023-11-27 PROCEDURE — 80053 COMPREHEN METABOLIC PANEL: CPT

## 2023-11-27 PROCEDURE — 2060000000 HC ICU INTERMEDIATE R&B

## 2023-11-27 PROCEDURE — 85652 RBC SED RATE AUTOMATED: CPT

## 2023-11-27 PROCEDURE — 85025 COMPLETE CBC W/AUTO DIFF WBC: CPT

## 2023-11-27 RX ORDER — SODIUM CHLORIDE, SODIUM LACTATE, POTASSIUM CHLORIDE, CALCIUM CHLORIDE 600; 310; 30; 20 MG/100ML; MG/100ML; MG/100ML; MG/100ML
INJECTION, SOLUTION INTRAVENOUS CONTINUOUS
Status: DISCONTINUED | OUTPATIENT
Start: 2023-11-27 | End: 2023-11-29 | Stop reason: HOSPADM

## 2023-11-27 RX ORDER — ACETAMINOPHEN 325 MG/1
650 TABLET ORAL EVERY 6 HOURS PRN
Status: DISCONTINUED | OUTPATIENT
Start: 2023-11-27 | End: 2023-11-29 | Stop reason: HOSPADM

## 2023-11-27 RX ORDER — SODIUM CHLORIDE 0.9 % (FLUSH) 0.9 %
5-40 SYRINGE (ML) INJECTION EVERY 12 HOURS SCHEDULED
Status: DISCONTINUED | OUTPATIENT
Start: 2023-11-27 | End: 2023-11-29 | Stop reason: HOSPADM

## 2023-11-27 RX ORDER — ENOXAPARIN SODIUM 100 MG/ML
30 INJECTION SUBCUTANEOUS 2 TIMES DAILY
Status: DISCONTINUED | OUTPATIENT
Start: 2023-11-27 | End: 2023-11-28

## 2023-11-27 RX ORDER — POLYETHYLENE GLYCOL 3350 17 G/17G
17 POWDER, FOR SOLUTION ORAL DAILY PRN
Status: DISCONTINUED | OUTPATIENT
Start: 2023-11-27 | End: 2023-11-29 | Stop reason: HOSPADM

## 2023-11-27 RX ORDER — ONDANSETRON 4 MG/1
4 TABLET, ORALLY DISINTEGRATING ORAL EVERY 8 HOURS PRN
Status: DISCONTINUED | OUTPATIENT
Start: 2023-11-27 | End: 2023-11-29 | Stop reason: HOSPADM

## 2023-11-27 RX ORDER — POTASSIUM CHLORIDE 7.45 MG/ML
10 INJECTION INTRAVENOUS PRN
Status: DISCONTINUED | OUTPATIENT
Start: 2023-11-27 | End: 2023-11-29 | Stop reason: HOSPADM

## 2023-11-27 RX ORDER — SODIUM CHLORIDE 0.9 % (FLUSH) 0.9 %
5-40 SYRINGE (ML) INJECTION PRN
Status: DISCONTINUED | OUTPATIENT
Start: 2023-11-27 | End: 2023-11-29 | Stop reason: HOSPADM

## 2023-11-27 RX ORDER — ONDANSETRON 2 MG/ML
4 INJECTION INTRAMUSCULAR; INTRAVENOUS EVERY 6 HOURS PRN
Status: DISCONTINUED | OUTPATIENT
Start: 2023-11-27 | End: 2023-11-29 | Stop reason: HOSPADM

## 2023-11-27 RX ORDER — MAGNESIUM SULFATE IN WATER 40 MG/ML
2000 INJECTION, SOLUTION INTRAVENOUS PRN
Status: DISCONTINUED | OUTPATIENT
Start: 2023-11-27 | End: 2023-11-29 | Stop reason: HOSPADM

## 2023-11-27 RX ORDER — SODIUM CHLORIDE 9 MG/ML
INJECTION, SOLUTION INTRAVENOUS PRN
Status: DISCONTINUED | OUTPATIENT
Start: 2023-11-27 | End: 2023-11-29 | Stop reason: HOSPADM

## 2023-11-27 RX ORDER — ACETAMINOPHEN 650 MG/1
650 SUPPOSITORY RECTAL EVERY 6 HOURS PRN
Status: DISCONTINUED | OUTPATIENT
Start: 2023-11-27 | End: 2023-11-29 | Stop reason: HOSPADM

## 2023-11-27 RX ORDER — POTASSIUM CHLORIDE 750 MG/1
40 TABLET, FILM COATED, EXTENDED RELEASE ORAL PRN
Status: DISCONTINUED | OUTPATIENT
Start: 2023-11-27 | End: 2023-11-29 | Stop reason: HOSPADM

## 2023-11-27 RX ADMIN — SODIUM CHLORIDE, POTASSIUM CHLORIDE, SODIUM LACTATE AND CALCIUM CHLORIDE: 600; 310; 30; 20 INJECTION, SOLUTION INTRAVENOUS at 23:45

## 2023-11-27 ASSESSMENT — PAIN SCALES - GENERAL
PAINLEVEL_OUTOF10: 0
PAINLEVEL_OUTOF10: 0

## 2023-11-27 ASSESSMENT — PAIN - FUNCTIONAL ASSESSMENT: PAIN_FUNCTIONAL_ASSESSMENT: 0-10

## 2023-11-27 NOTE — ED PROVIDER NOTES
management or test interpretation with external provider(s): On-call resident for [address] [facility]            REASSESSMENT            CONSULTS:  None    PROCEDURES:  Unless otherwise noted below, none     Procedures      FINAL IMPRESSION      1. Muscular weakness          DISPOSITION/PLAN   DISPOSITION Decision To Admit 11/27/2023 08:49:39 PM      PATIENT REFERRED TO:  No follow-up provider specified. DISCHARGE MEDICATIONS:  New Prescriptions    No medications on file         (Please note that portions of this note were completed with a voice recognition program.  Efforts were made to edit the dictations but occasionally words are mis-transcribed.)    Minoo Morris (electronically signed)  Emergency Attending Physician / Physician Assistant / Nurse Practitioner    Discussed with on call resident for family medicine - will come see and admit.   BRYON Morris  8:40PM         Minoo Morris  11/27/23 2050

## 2023-11-27 NOTE — ED TRIAGE NOTES
Patient  had two episodes of syncope this summer. Since that time has had intermittent dizziness and generalized weakness, worsening recently.  has been out of work for over a month because the symptoms have increased.  will feel hot all over and some palpitations, then feels like she is going to pass out. Has had increasing generalized weakness, has had to have help at home with basic activities. Some intermittent blurred vision, no predictable time of day or instigating factor. States the weakness is usually one side at a time. Has a history of injury in the neck on the left, initially had more symptoms on the left, now also the right at times. Seen by PCP in October.  woke one day in October at 0600 and could not move her right arm because it was numb until 1300, then the arm hurt after.

## 2023-11-28 ENCOUNTER — APPOINTMENT (OUTPATIENT)
Dept: VASCULAR SURGERY | Facility: HOSPITAL | Age: 28
DRG: 552 | End: 2023-11-28
Attending: STUDENT IN AN ORGANIZED HEALTH CARE EDUCATION/TRAINING PROGRAM
Payer: COMMERCIAL

## 2023-11-28 ENCOUNTER — APPOINTMENT (OUTPATIENT)
Facility: HOSPITAL | Age: 28
DRG: 552 | End: 2023-11-28
Attending: STUDENT IN AN ORGANIZED HEALTH CARE EDUCATION/TRAINING PROGRAM
Payer: COMMERCIAL

## 2023-11-28 ENCOUNTER — APPOINTMENT (OUTPATIENT)
Facility: HOSPITAL | Age: 28
DRG: 552 | End: 2023-11-28
Payer: COMMERCIAL

## 2023-11-28 PROBLEM — K31.84 GASTROPARESIS: Status: ACTIVE | Noted: 2023-11-28

## 2023-11-28 PROBLEM — J45.20 MILD INTERMITTENT ASTHMA WITHOUT COMPLICATION: Status: ACTIVE | Noted: 2023-11-28

## 2023-11-28 PROBLEM — E66.9 OBESITY: Status: ACTIVE | Noted: 2017-08-29

## 2023-11-28 PROBLEM — M54.12 CERVICAL RADICULOPATHY: Status: ACTIVE | Noted: 2023-11-28

## 2023-11-28 PROBLEM — E28.2 POLYCYSTIC OVARIES: Status: ACTIVE | Noted: 2019-08-14

## 2023-11-28 PROBLEM — F41.9 ANXIETY: Status: ACTIVE | Noted: 2018-04-18

## 2023-11-28 PROBLEM — J45.20 MILD INTERMITTENT ASTHMA WITHOUT COMPLICATION: Status: RESOLVED | Noted: 2023-11-28 | Resolved: 2023-11-28

## 2023-11-28 PROBLEM — F32.A MILD DEPRESSION: Status: ACTIVE | Noted: 2018-10-16

## 2023-11-28 PROBLEM — R55 SYNCOPE AND COLLAPSE: Status: ACTIVE | Noted: 2023-11-28

## 2023-11-28 PROBLEM — M62.81 MUSCULAR WEAKNESS: Status: ACTIVE | Noted: 2023-11-28

## 2023-11-28 LAB
25(OH)D3 SERPL-MCNC: 20.2 NG/ML (ref 30–100)
ALBUMIN SERPL-MCNC: 3.4 G/DL (ref 3.5–5)
ALBUMIN/GLOB SERPL: 0.7 (ref 1.1–2.2)
ALP SERPL-CCNC: 87 U/L (ref 45–117)
ALT SERPL-CCNC: 62 U/L (ref 12–78)
ANION GAP SERPL CALC-SCNC: 8 MMOL/L (ref 5–15)
AST SERPL-CCNC: 30 U/L (ref 15–37)
BASOPHILS # BLD: 0 K/UL (ref 0–0.1)
BASOPHILS NFR BLD: 0 % (ref 0–1)
BILIRUB SERPL-MCNC: 0.4 MG/DL (ref 0.2–1)
BUN SERPL-MCNC: 13 MG/DL (ref 6–20)
BUN/CREAT SERPL: 19 (ref 12–20)
CALCIUM SERPL-MCNC: 8.9 MG/DL (ref 8.5–10.1)
CHLORIDE SERPL-SCNC: 106 MMOL/L (ref 97–108)
CO2 SERPL-SCNC: 24 MMOL/L (ref 21–32)
CREAT SERPL-MCNC: 0.68 MG/DL (ref 0.55–1.02)
DIFFERENTIAL METHOD BLD: NORMAL
ECHO AO ASC DIAM: 2.6 CM
ECHO AO ASCENDING AORTA INDEX: 1.24 CM/M2
ECHO AV AREA PEAK VELOCITY: 2.3 CM2
ECHO AV AREA VTI: 2.3 CM2
ECHO AV AREA/BSA PEAK VELOCITY: 1.1 CM2/M2
ECHO AV AREA/BSA VTI: 1.1 CM2/M2
ECHO AV MEAN GRADIENT: 4 MMHG
ECHO AV MEAN VELOCITY: 0.9 M/S
ECHO AV PEAK GRADIENT: 6 MMHG
ECHO AV PEAK VELOCITY: 1.3 M/S
ECHO AV VELOCITY RATIO: 0.77
ECHO AV VTI: 28.3 CM
ECHO BSA: 2.18 M2
ECHO LA DIAMETER INDEX: 1.52 CM/M2
ECHO LA DIAMETER: 3.2 CM
ECHO LA VOL A-L A2C: 37 ML (ref 22–52)
ECHO LA VOL A-L A4C: 47 ML (ref 22–52)
ECHO LA VOL BP: 43 ML (ref 22–52)
ECHO LA VOL MOD A2C: 36 ML (ref 22–52)
ECHO LA VOL MOD A4C: 46 ML (ref 22–52)
ECHO LA VOL/BSA BIPLANE: 20 ML/M2 (ref 16–34)
ECHO LA VOLUME AREA LENGTH: 45 ML
ECHO LA VOLUME INDEX A-L A2C: 18 ML/M2 (ref 16–34)
ECHO LA VOLUME INDEX A-L A4C: 22 ML/M2 (ref 16–34)
ECHO LA VOLUME INDEX AREA LENGTH: 21 ML/M2 (ref 16–34)
ECHO LA VOLUME INDEX MOD A2C: 17 ML/M2 (ref 16–34)
ECHO LA VOLUME INDEX MOD A4C: 22 ML/M2 (ref 16–34)
ECHO LV E' LATERAL VELOCITY: 13 CM/S
ECHO LV E' SEPTAL VELOCITY: 13 CM/S
ECHO LV EDV A2C: 71 ML
ECHO LV EDV A4C: 67 ML
ECHO LV EDV BP: 72 ML (ref 56–104)
ECHO LV EDV INDEX A4C: 32 ML/M2
ECHO LV EDV INDEX BP: 34 ML/M2
ECHO LV EDV NDEX A2C: 34 ML/M2
ECHO LV EJECTION FRACTION A2C: 59 %
ECHO LV EJECTION FRACTION A4C: 57 %
ECHO LV EJECTION FRACTION BIPLANE: 58 % (ref 55–100)
ECHO LV ESV A2C: 29 ML
ECHO LV ESV A4C: 29 ML
ECHO LV ESV BP: 30 ML (ref 19–49)
ECHO LV ESV INDEX A2C: 14 ML/M2
ECHO LV ESV INDEX A4C: 14 ML/M2
ECHO LV ESV INDEX BP: 14 ML/M2
ECHO LV FRACTIONAL SHORTENING: 30 % (ref 28–44)
ECHO LV INTERNAL DIMENSION DIASTOLE INDEX: 2.1 CM/M2
ECHO LV INTERNAL DIMENSION DIASTOLIC: 4.4 CM (ref 3.9–5.3)
ECHO LV INTERNAL DIMENSION SYSTOLIC INDEX: 1.48 CM/M2
ECHO LV INTERNAL DIMENSION SYSTOLIC: 3.1 CM
ECHO LV IVSD: 1 CM (ref 0.6–0.9)
ECHO LV MASS 2D: 147.8 G (ref 67–162)
ECHO LV MASS INDEX 2D: 70.4 G/M2 (ref 43–95)
ECHO LV POSTERIOR WALL DIASTOLIC: 1 CM (ref 0.6–0.9)
ECHO LV RELATIVE WALL THICKNESS RATIO: 0.45
ECHO LVOT AREA: 3.1 CM2
ECHO LVOT AV VTI INDEX: 0.76
ECHO LVOT DIAM: 2 CM
ECHO LVOT MEAN GRADIENT: 2 MMHG
ECHO LVOT PEAK GRADIENT: 4 MMHG
ECHO LVOT PEAK VELOCITY: 1 M/S
ECHO LVOT STROKE VOLUME INDEX: 32 ML/M2
ECHO LVOT SV: 67.2 ML
ECHO LVOT VTI: 21.4 CM
ECHO MV A VELOCITY: 0.51 M/S
ECHO MV E DECELERATION TIME (DT): 221.3 MS
ECHO MV E VELOCITY: 0.79 M/S
ECHO MV E/A RATIO: 1.55
ECHO MV E/E' LATERAL: 6.08
ECHO MV E/E' RATIO (AVERAGED): 6.08
ECHO PULMONARY ARTERY END DIASTOLIC PRESSURE: 3 MMHG
ECHO PV MAX VELOCITY: 1.1 M/S
ECHO PV PEAK GRADIENT: 5 MMHG
ECHO PV REGURGITANT MAX VELOCITY: 0.9 M/S
ECHO RV INTERNAL DIMENSION: 3.1 CM
ECHO RV TAPSE: 2.3 CM (ref 1.7–?)
ECHO RVOT PEAK GRADIENT: 1 MMHG
ECHO RVOT PEAK VELOCITY: 0.6 M/S
ECHO TV REGURGITANT MAX VELOCITY: 2.11 M/S
ECHO TV REGURGITANT PEAK GRADIENT: 18 MMHG
EOSINOPHIL # BLD: 0.2 K/UL (ref 0–0.4)
EOSINOPHIL NFR BLD: 2 % (ref 0–7)
ERYTHROCYTE [DISTWIDTH] IN BLOOD BY AUTOMATED COUNT: 13.2 % (ref 11.5–14.5)
FOLATE SERPL-MCNC: 11.8 NG/ML (ref 5–21)
GLOBULIN SER CALC-MCNC: 4.6 G/DL (ref 2–4)
GLUCOSE SERPL-MCNC: 93 MG/DL (ref 65–100)
HCT VFR BLD AUTO: 39.7 % (ref 35–47)
HGB BLD-MCNC: 12.3 G/DL (ref 11.5–16)
IMM GRANULOCYTES # BLD AUTO: 0 K/UL (ref 0–0.04)
IMM GRANULOCYTES NFR BLD AUTO: 0 % (ref 0–0.5)
LYMPHOCYTES # BLD: 3.4 K/UL (ref 0.8–3.5)
LYMPHOCYTES NFR BLD: 44 % (ref 12–49)
MCH RBC QN AUTO: 26.3 PG (ref 26–34)
MCHC RBC AUTO-ENTMCNC: 31 G/DL (ref 30–36.5)
MCV RBC AUTO: 84.8 FL (ref 80–99)
MONOCYTES # BLD: 0.7 K/UL (ref 0–1)
MONOCYTES NFR BLD: 9 % (ref 5–13)
NEUTS SEG # BLD: 3.4 K/UL (ref 1.8–8)
NEUTS SEG NFR BLD: 45 % (ref 32–75)
NRBC # BLD: 0 K/UL (ref 0–0.01)
NRBC BLD-RTO: 0 PER 100 WBC
PLATELET # BLD AUTO: 369 K/UL (ref 150–400)
PMV BLD AUTO: 10.9 FL (ref 8.9–12.9)
POTASSIUM SERPL-SCNC: 3.7 MMOL/L (ref 3.5–5.1)
PROT SERPL-MCNC: 8 G/DL (ref 6.4–8.2)
RBC # BLD AUTO: 4.68 M/UL (ref 3.8–5.2)
SODIUM SERPL-SCNC: 138 MMOL/L (ref 136–145)
VIT B12 SERPL-MCNC: 945 PG/ML (ref 193–986)
WBC # BLD AUTO: 7.7 K/UL (ref 3.6–11)

## 2023-11-28 PROCEDURE — 70553 MRI BRAIN STEM W/O & W/DYE: CPT

## 2023-11-28 PROCEDURE — 72158 MRI LUMBAR SPINE W/O & W/DYE: CPT

## 2023-11-28 PROCEDURE — 93306 TTE W/DOPPLER COMPLETE: CPT | Performed by: INTERNAL MEDICINE

## 2023-11-28 PROCEDURE — 97116 GAIT TRAINING THERAPY: CPT

## 2023-11-28 PROCEDURE — 82306 VITAMIN D 25 HYDROXY: CPT

## 2023-11-28 PROCEDURE — 86431 RHEUMATOID FACTOR QUANT: CPT

## 2023-11-28 PROCEDURE — 82746 ASSAY OF FOLIC ACID SERUM: CPT

## 2023-11-28 PROCEDURE — 97161 PT EVAL LOW COMPLEX 20 MIN: CPT

## 2023-11-28 PROCEDURE — 6370000000 HC RX 637 (ALT 250 FOR IP): Performed by: STUDENT IN AN ORGANIZED HEALTH CARE EDUCATION/TRAINING PROGRAM

## 2023-11-28 PROCEDURE — 72156 MRI NECK SPINE W/O & W/DYE: CPT

## 2023-11-28 PROCEDURE — 97530 THERAPEUTIC ACTIVITIES: CPT

## 2023-11-28 PROCEDURE — 99223 1ST HOSP IP/OBS HIGH 75: CPT | Performed by: NURSE PRACTITIONER

## 2023-11-28 PROCEDURE — 6360000004 HC RX CONTRAST MEDICATION: Performed by: RADIOLOGY

## 2023-11-28 PROCEDURE — A9579 GAD-BASE MR CONTRAST NOS,1ML: HCPCS | Performed by: RADIOLOGY

## 2023-11-28 PROCEDURE — 99222 1ST HOSP IP/OBS MODERATE 55: CPT | Performed by: FAMILY MEDICINE

## 2023-11-28 PROCEDURE — 97165 OT EVAL LOW COMPLEX 30 MIN: CPT

## 2023-11-28 PROCEDURE — 86225 DNA ANTIBODY NATIVE: CPT

## 2023-11-28 PROCEDURE — 80053 COMPREHEN METABOLIC PANEL: CPT

## 2023-11-28 PROCEDURE — 82607 VITAMIN B-12: CPT

## 2023-11-28 PROCEDURE — 95819 EEG AWAKE AND ASLEEP: CPT

## 2023-11-28 PROCEDURE — 83519 RIA NONANTIBODY: CPT

## 2023-11-28 PROCEDURE — 36415 COLL VENOUS BLD VENIPUNCTURE: CPT

## 2023-11-28 PROCEDURE — 86200 CCP ANTIBODY: CPT

## 2023-11-28 PROCEDURE — 2580000003 HC RX 258

## 2023-11-28 PROCEDURE — 93306 TTE W/DOPPLER COMPLETE: CPT

## 2023-11-28 PROCEDURE — 72157 MRI CHEST SPINE W/O & W/DYE: CPT

## 2023-11-28 PROCEDURE — 94761 N-INVAS EAR/PLS OXIMETRY MLT: CPT

## 2023-11-28 PROCEDURE — 83520 IMMUNOASSAY QUANT NOS NONAB: CPT

## 2023-11-28 PROCEDURE — 95816 EEG AWAKE AND DROWSY: CPT | Performed by: PSYCHIATRY & NEUROLOGY

## 2023-11-28 PROCEDURE — 6360000002 HC RX W HCPCS

## 2023-11-28 PROCEDURE — 86235 NUCLEAR ANTIGEN ANTIBODY: CPT

## 2023-11-28 PROCEDURE — 93880 EXTRACRANIAL BILAT STUDY: CPT

## 2023-11-28 PROCEDURE — 85025 COMPLETE CBC W/AUTO DIFF WBC: CPT

## 2023-11-28 PROCEDURE — 97112 NEUROMUSCULAR REEDUCATION: CPT

## 2023-11-28 PROCEDURE — 2060000000 HC ICU INTERMEDIATE R&B

## 2023-11-28 PROCEDURE — 84165 PROTEIN E-PHORESIS SERUM: CPT

## 2023-11-28 PROCEDURE — 84425 ASSAY OF VITAMIN B-1: CPT

## 2023-11-28 RX ORDER — PANTOPRAZOLE SODIUM 40 MG/1
40 TABLET, DELAYED RELEASE ORAL
Status: DISCONTINUED | OUTPATIENT
Start: 2023-11-28 | End: 2023-11-29 | Stop reason: HOSPADM

## 2023-11-28 RX ORDER — VITAMIN B COMPLEX
2000 TABLET ORAL DAILY
Status: DISCONTINUED | OUTPATIENT
Start: 2023-11-29 | End: 2023-11-29 | Stop reason: HOSPADM

## 2023-11-28 RX ORDER — ENOXAPARIN SODIUM 100 MG/ML
30 INJECTION SUBCUTANEOUS 2 TIMES DAILY
Status: ACTIVE | OUTPATIENT
Start: 2023-11-28 | End: 2023-11-28

## 2023-11-28 RX ORDER — VITAMIN B COMPLEX
1000 TABLET ORAL DAILY
Status: DISCONTINUED | OUTPATIENT
Start: 2023-11-28 | End: 2023-11-28

## 2023-11-28 RX ADMIN — SODIUM CHLORIDE, POTASSIUM CHLORIDE, SODIUM LACTATE AND CALCIUM CHLORIDE: 600; 310; 30; 20 INJECTION, SOLUTION INTRAVENOUS at 12:55

## 2023-11-28 RX ADMIN — GADOTERIDOL 20 ML: 279.3 INJECTION, SOLUTION INTRAVENOUS at 16:47

## 2023-11-28 RX ADMIN — SODIUM CHLORIDE, PRESERVATIVE FREE 10 ML: 5 INJECTION INTRAVENOUS at 10:21

## 2023-11-28 RX ADMIN — ONDANSETRON 4 MG: 2 INJECTION INTRAMUSCULAR; INTRAVENOUS at 04:43

## 2023-11-28 RX ADMIN — Medication 1000 UNITS: at 11:38

## 2023-11-28 RX ADMIN — SODIUM CHLORIDE, PRESERVATIVE FREE 10 ML: 5 INJECTION INTRAVENOUS at 19:56

## 2023-11-28 ASSESSMENT — ENCOUNTER SYMPTOMS
RHINORRHEA: 0
EYE PAIN: 0
COLOR CHANGE: 0
DIARRHEA: 0
NAUSEA: 0
PHOTOPHOBIA: 0
COUGH: 0
VOMITING: 0
CONSTIPATION: 0
SHORTNESS OF BREATH: 1
CHEST TIGHTNESS: 0

## 2023-11-28 ASSESSMENT — PAIN SCALES - GENERAL: PAINLEVEL_OUTOF10: 0

## 2023-11-28 NOTE — ED NOTES
TRANSFER - OUT REPORT:    Verbal report given to [facility] EVANGELISTA LAURENT RN on Mingo Castro  being transferred to room 307 for routine progression of patient care       Report consisted of patient's Situation, Background, Assessment and   Recommendations(SBAR). Information from the following report(s) ED SBAR, MAR, and Recent Results was reviewed with the receiving nurse. [place] Fall Assessment:    Presents to emergency department  because of falls (Syncope, seizure, or loss of consciousness): No  Age > 70: No  Altered Mental Status, Intoxication with alcohol or substance confusion (Disorientation, impaired judgment, poor safety awaremess, or inability to follow instructions): No  Impaired Mobility: Ambulates or transfers with assistive devices or assistance; Unable to ambulate or transer.: No  Nursing Judgement: No          Lines:   Peripheral IV 11/27/23 Right Antecubital (Active)   Site Assessment Clean, dry & intact 11/27/23 1655   Line Status Blood return noted 11/27/23 1655   Phlebitis Assessment No symptoms 11/27/23 1655   Infiltration Assessment 0 11/27/23 1655   Dressing Status Clean, dry & intact 11/27/23 1655   Dressing Type Transparent 11/27/23 1655        Opportunity for questions and clarification was provided.       Patient transported with:  Mumtaz Vazquez RN  50/23/12 3274

## 2023-11-28 NOTE — ED NOTES
Rounded on patient, noted to be tearful, verbalizing that she misses her dog, aving some feelings of hot an cold, denies numbness at this time, VSS.       Maryjo Loya, NATASHA  34/41/78 [id number]

## 2023-11-28 NOTE — DISCHARGE SUMMARY
demyelinating lesions causing progressive weakness. COMPARISON: None TECHNIQUE: MR imaging of the cervical spine was performed using the following sequences: sagittal T1, T2, STIR;  axial T2, T1 prior to and following contrast administration. CONTRAST: 20 mL of ProHance. FINDINGS: There is incidental mild reversal of curvature of the cervical spine centered at C5. Vertebral body heights are maintained. Marrow signal is normal. The craniocervical junction is intact. The course, caliber, and signal intensity of the spinal cord are normal. There is no pathologic intrathecal enhancement. The paraspinal soft tissues are within normal limits. C2-C3: No herniation or stenosis. C3-C4: No herniation or stenosis. C4-C5: No suspicious spinal stenosis. There is mild right and moderate left neural foraminal narrowing. C5-C6: Minimal central disc osteophyte complex without significant spinal stenosis. There is mild bilateral neural foraminal narrowing. C6-C7: No herniation or stenosis. C7-T1: No herniation or stenosis. No acute abnormality. Unremarkable appearance of the visualized cord. Spondylosis as above. MRI BRAIN W WO CONTRAST    Result Date: 11/28/2023  EXAM:  MRI BRAIN W WO CONTRAST INDICATION:    Concern for MS COMPARISON:  CT 11/9/2023. CONTRAST: 20 ml ProHance. TECHNIQUE:  Multiplanar multisequence acquisition without and with contrast of the brain. FINDINGS: The ventricles are normal in size and position. There is no acute infarct, hemorrhage, extra-axial fluid collection, or mass effect. There is no cerebellar tonsillar herniation. Expected arterial flow-voids are present. No evidence of abnormal enhancement. The paranasal sinuses, mastoid air cells, and middle ears are clear. The orbital contents are within normal limits. No significant osseous or scalp lesions are identified. Unremarkable MRI of the brain. No evidence of multiple sclerosis.     Echo (TTE) complete (PRN

## 2023-11-28 NOTE — CONSULTS
contrast/bubble/strain/3D)    Collection Time: 11/28/23 11:26 AM   Result Value Ref Range    IVSd 1.0 (A) 0.6 - 0.9 cm    LVIDd 4.4 3.9 - 5.3 cm    LVIDs 3.1 cm    LVOT Diameter 2.0 cm    LVPWd 1.0 (A) 0.6 - 0.9 cm    EF BP 58 55 - 100 %    LV Ejection Fraction A2C 59 %    LV Ejection Fraction A4C 57 %    LV EDV A2C 71 mL    LV EDV A4C 67 mL    LV EDV BP 72 56 - 104 mL    LV ESV A2C 29 mL    LV ESV A4C 29 mL    LV ESV BP 30 19 - 49 mL    LVOT Peak Gradient 4 mmHg    LVOT Mean Gradient 2 mmHg    LVOT SV 67.2 ml    LVOT Peak Velocity 1.0 m/s    LVOT VTI 21.4 cm    RVIDd 3.1 cm    RVOT Peak Gradient 1 mmHg    RVOT Peak Velocity 0.6 m/s    LA Diameter 3.2 cm    LA Volume A/L 45 mL    LA Volume A-L A4C 37 22 - 52 mL    LA Volume A-L A4C 47 22 - 52 mL    LA Volume MOD A2C 36 22 - 52 mL    LA Volume MOD A4C 46 22 - 52 mL    LA Volume BP 43 22 - 52 mL    AV Area by Peak Velocity 2.3 cm2    AV Area by VTI 2.3 cm2    AV Peak Gradient 6 mmHg    AV Mean Gradient 4 mmHg    AV Peak Velocity 1.3 m/s    AV Mean Velocity 0.9 m/s    AV VTI 28.3 cm    MV A Velocity 0.51 m/s    MV E Wave Deceleration Time 221.3 ms    MV E Velocity 0.79 m/s    LV E' Lateral Velocity 13 cm/s    LV E' Septal Velocity 13 cm/s    Pulmonary Artery EDP 3 mmHg    MI Max Velocity 0.9 m/s    PV Peak Gradient 5 mmHg    PV Max Velocity 1.1 m/s    TAPSE 2.3 1.7 cm    TR Peak Gradient 18 mmHg    TR Max Velocity 2.11 m/s    Ascending Aorta 2.6 cm    Body Surface Area 2.18 m2    Fractional Shortening 2D 30 28 - 44 %    LV ESV Index BP 14 mL/m2    LV EDV Index BP 34 mL/m2    LV ESV Index A4C 14 mL/m2    LV EDV Index A4C 32 mL/m2    LV ESV Index A2C 14 mL/m2    LV EDV Index A2C 34 mL/m2    LVIDd Index 2.10 cm/m2    LVIDs Index 1.48 cm/m2    LV RWT Ratio 0.45     LV Mass 2D 147.8 67 - 162 g    LV Mass 2D Index 70.4 43 - 95 g/m2    MV E/A 1.55     E/E' Ratio (Averaged) 6.08     E/E' Lateral 6.08     LA Volume Index BP 20 16 - 34 ml/m2    LA Volume Index A/L 21 16 - 34

## 2023-11-29 ENCOUNTER — APPOINTMENT (OUTPATIENT)
Facility: HOSPITAL | Age: 28
DRG: 552 | End: 2023-11-29
Attending: STUDENT IN AN ORGANIZED HEALTH CARE EDUCATION/TRAINING PROGRAM
Payer: COMMERCIAL

## 2023-11-29 VITALS
BODY MASS INDEX: 36.16 KG/M2 | SYSTOLIC BLOOD PRESSURE: 138 MMHG | WEIGHT: 225 LBS | HEART RATE: 80 BPM | TEMPERATURE: 98.8 F | OXYGEN SATURATION: 94 % | RESPIRATION RATE: 17 BRPM | HEIGHT: 66 IN | DIASTOLIC BLOOD PRESSURE: 106 MMHG

## 2023-11-29 LAB
APPEARANCE CSF: CLEAR
C GATTII+NEOFOR DNA CSF QL NAA+NON-PROBE: NOT DETECTED
CENTROMERE B AB SER-ACNC: <0.2 AI (ref 0–0.9)
CHROMATIN AB SERPL-ACNC: <0.2 AI (ref 0–0.9)
CMV DNA CSF QL NAA+NON-PROBE: NOT DETECTED
COLOR CSF: COLORLESS
DSDNA AB SER-ACNC: <1 IU/ML (ref 0–9)
E COLI K1 DNA CSF QL NAA+NON-PROBE: NOT DETECTED
ECHO BSA: 2.18 M2
ENA JO1 AB SER-ACNC: <0.2 AI (ref 0–0.9)
ENA RNP AB SER-ACNC: 0.2 AI (ref 0–0.9)
ENA SCL70 AB SER-ACNC: 0.2 AI (ref 0–0.9)
ENA SM AB SER-ACNC: <0.2 AI (ref 0–0.9)
ENA SS-A AB SER-ACNC: 0.2 AI (ref 0–0.9)
ENA SS-B AB SER-ACNC: <0.2 AI (ref 0–0.9)
EV RNA CSF QL NAA+NON-PROBE: NOT DETECTED
GLUCOSE CSF-MCNC: 62 MG/DL (ref 40–70)
GP B STREP DNA CSF QL NAA+NON-PROBE: NOT DETECTED
HAEM INFLU DNA CSF QL NAA+NON-PROBE: NOT DETECTED
HCG SERPL QL: NEGATIVE
HHV6 DNA CSF QL NAA+NON-PROBE: NOT DETECTED
HSV1 DNA CSF QL NAA+PROBE: NOT DETECTED
HSV2 DNA CSF QL NAA+NON-PROBE: NOT DETECTED
L MONOCYTOG DNA CSF QL NAA+NON-PROBE: NOT DETECTED
Lab: NORMAL
N MEN DNA CSF QL NAA+NON-PROBE: NOT DETECTED
PARECHOVIRUS A RNA CSF QL NAA+NON-PROBE: NOT DETECTED
PROT CSF-MCNC: 33 MG/DL (ref 15–45)
RBC # CSF: 6 /CU MM
S PNEUM DNA CSF QL NAA+NON-PROBE: NOT DETECTED
TUBE # CSF: ABNORMAL
TUBE # CSF: NORMAL
TUBE # CSF: NORMAL
VAS LEFT CCA DIST EDV: 41.8 CM/S
VAS LEFT CCA DIST PSV: 120.2 CM/S
VAS LEFT CCA PROX EDV: 36.3 CM/S
VAS LEFT CCA PROX PSV: 122 CM/S
VAS LEFT ECA EDV: 16.2 CM/S
VAS LEFT ECA PSV: 74.6 CM/S
VAS LEFT ICA DIST EDV: 61.8 CM/S
VAS LEFT ICA DIST PSV: 105.5 CM/S
VAS LEFT ICA MID EDV: 67.3 CM/S
VAS LEFT ICA MID PSV: 120.1 CM/S
VAS LEFT ICA PROX EDV: 52.7 CM/S
VAS LEFT ICA PROX PSV: 96.5 CM/S
VAS LEFT ICA/CCA PSV: 1 NO UNITS
VAS LEFT SUBCLAVIAN PROX EDV: 16.2 CM/S
VAS LEFT SUBCLAVIAN PROX PSV: 123.8 CM/S
VAS LEFT VERTEBRAL EDV: 34.4 CM/S
VAS LEFT VERTEBRAL PSV: 65.4 CM/S
VAS RIGHT CCA DIST EDV: 36.3 CM/S
VAS RIGHT CCA DIST PSV: 112.9 CM/S
VAS RIGHT CCA PROX EDV: 27.2 CM/S
VAS RIGHT CCA PROX PSV: 125.8 CM/S
VAS RIGHT ECA EDV: 19.9 CM/S
VAS RIGHT ECA PSV: 94.7 CM/S
VAS RIGHT ICA DIST EDV: 71 CM/S
VAS RIGHT ICA DIST PSV: 109.3 CM/S
VAS RIGHT ICA MID EDV: 71 CM/S
VAS RIGHT ICA MID PSV: 127.5 CM/S
VAS RIGHT ICA PROX EDV: 52.7 CM/S
VAS RIGHT ICA PROX PSV: 112.9 CM/S
VAS RIGHT ICA/CCA PSV: 1.1 NO UNITS
VAS RIGHT SUBCLAVIAN PROX EDV: 0 CM/S
VAS RIGHT SUBCLAVIAN PROX PSV: 114.9 CM/S
VAS RIGHT VERTEBRAL EDV: 34.5 CM/S
VAS RIGHT VERTEBRAL PSV: 74.6 CM/S
VZV DNA CSF QL NAA+NON-PROBE: NOT DETECTED
WBC # CSF: 4 /CU MM (ref 0–5)

## 2023-11-29 PROCEDURE — 83916 OLIGOCLONAL BANDS: CPT

## 2023-11-29 PROCEDURE — 86381 MITOCHONDRIAL ANTIBODY EACH: CPT

## 2023-11-29 PROCEDURE — 87483 CNS DNA AMP PROBE TYPE 12-25: CPT

## 2023-11-29 PROCEDURE — 36415 COLL VENOUS BLD VENIPUNCTURE: CPT

## 2023-11-29 PROCEDURE — 86788 WEST NILE VIRUS AB IGM: CPT

## 2023-11-29 PROCEDURE — 84157 ASSAY OF PROTEIN OTHER: CPT

## 2023-11-29 PROCEDURE — 97530 THERAPEUTIC ACTIVITIES: CPT

## 2023-11-29 PROCEDURE — 6370000000 HC RX 637 (ALT 250 FOR IP)

## 2023-11-29 PROCEDURE — 86789 WEST NILE VIRUS ANTIBODY: CPT

## 2023-11-29 PROCEDURE — 009U3ZX DRAINAGE OF SPINAL CANAL, PERCUTANEOUS APPROACH, DIAGNOSTIC: ICD-10-PCS | Performed by: FAMILY MEDICINE

## 2023-11-29 PROCEDURE — 84703 CHORIONIC GONADOTROPIN ASSAY: CPT

## 2023-11-29 PROCEDURE — 99238 HOSP IP/OBS DSCHRG MGMT 30/<: CPT | Performed by: FAMILY MEDICINE

## 2023-11-29 PROCEDURE — 82945 GLUCOSE OTHER FLUID: CPT

## 2023-11-29 PROCEDURE — 97116 GAIT TRAINING THERAPY: CPT

## 2023-11-29 PROCEDURE — 86592 SYPHILIS TEST NON-TREP QUAL: CPT

## 2023-11-29 PROCEDURE — 99232 SBSQ HOSP IP/OBS MODERATE 35: CPT | Performed by: NURSE PRACTITIONER

## 2023-11-29 PROCEDURE — 2580000003 HC RX 258

## 2023-11-29 PROCEDURE — 89050 BODY FLUID CELL COUNT: CPT

## 2023-11-29 PROCEDURE — 86618 LYME DISEASE ANTIBODY: CPT

## 2023-11-29 PROCEDURE — 62270 DX LMBR SPI PNXR: CPT | Performed by: NURSE PRACTITIONER

## 2023-11-29 PROCEDURE — 6370000000 HC RX 637 (ALT 250 FOR IP): Performed by: STUDENT IN AN ORGANIZED HEALTH CARE EDUCATION/TRAINING PROGRAM

## 2023-11-29 RX ORDER — ONDANSETRON 4 MG/1
4 TABLET, ORALLY DISINTEGRATING ORAL EVERY 8 HOURS PRN
Qty: 15 TABLET | Refills: 0 | Status: SHIPPED | OUTPATIENT
Start: 2023-11-29

## 2023-11-29 RX ORDER — ONDANSETRON 4 MG/1
4 TABLET, ORALLY DISINTEGRATING ORAL ONCE
Status: COMPLETED | OUTPATIENT
Start: 2023-11-29 | End: 2023-11-29

## 2023-11-29 RX ADMIN — PANTOPRAZOLE SODIUM 40 MG: 40 TABLET, DELAYED RELEASE ORAL at 08:00

## 2023-11-29 RX ADMIN — ONDANSETRON 4 MG: 4 TABLET, ORALLY DISINTEGRATING ORAL at 07:59

## 2023-11-29 RX ADMIN — ONDANSETRON 4 MG: 4 TABLET, ORALLY DISINTEGRATING ORAL at 13:39

## 2023-11-29 RX ADMIN — Medication 2000 UNITS: at 08:00

## 2023-11-29 RX ADMIN — SODIUM CHLORIDE, PRESERVATIVE FREE 10 ML: 5 INJECTION INTRAVENOUS at 08:09

## 2023-11-29 ASSESSMENT — PAIN SCALES - GENERAL: PAINLEVEL_OUTOF10: 0

## 2023-11-29 NOTE — CARE COORDINATION
11/29/23 420 - [address] [place] [address] Discharge DME  (rolling walker)     Care Management Discharge Note:   Per FP patient cleared for d/c. Order placed for RW, CM sent referral to [address] [place] [address] as well as [place] for RW for patient. [employer] has accepted and will deliver RW to patient, patient will transport home with family, no other identified CM needs.     ______________________  Dulce SHARP, RN  Care Management  11/29/2023  4:28 PM
Care Management Progress Note:   Per IDR patient for LP today. Dispo pending results and neuro clearance. Dispo is home with family, possible OP/PT, no anticipated CM needs.  CM following.    ______________________  Rosalind SHARP, RN  Care Management  11/29/2023  12:41 PM
Management Initial Assessment Note:     Per patient, patient lives alone. Patient is independent in all ADL/IADLs, working, current . Patient has no hx of DME or PAC services. Discussed d/c with patient, plan is to d/c home once medically clear. Mother to transport at time of d/c. No anticipated CM needs. CM following.       PCP: Dr. Benedict Reynoso  PCP last visit: one month ago  Pharmacy: [facility]  AMD: No but patient requested to review AMD paperwork, CM provided    ______________________  Italo AQUINON, RN  Care Management  11/28/2023  2:00 PM

## 2023-11-29 NOTE — DISCHARGE INSTRUCTIONS
HOME DISCHARGE INSTRUCTIONS    Briana Galvan / [id number] : 1995    Admission date: 2023 Discharge date: 2023     Please bring this form with you to show your care provider at your follow-up appointment. Primary care provider:  Staci Holt    Discharging provider:  Nilam Santos MD  - Family Medicine Resident  Dr. Mark Ogden. Attending, Family Medicine     You have been admitted to the hospital with the following diagnoses:    ACUTE DIAGNOSES:  Weakness [R53.1]  Muscular weakness [M62.81]    . . . . . . . . . . . . . . . . . . . . . . . . . . . . . . . . . . . . . . . . . . . . . . . . . . . . . . . . . . . . . . . . . . . . . . . .   Attending physician at discharge/transfer:     FOLLOW-UP CARE RECOMMENDATIONS:  You are well enough to be discharged from the hospital. Maria Guadalupe Elaine were in the hospital for weakness. We found no specific reason for your weakness so far. However, some blood tests and testing of your spinal fluid has not returned yet at the point of your discharge and the results of these will be discussed with you with your PCP. Your are also scheduled to have a Holter monitoring (heart rhythm monitoring) to rule out any rhythm problem with your heart with the heart specialist.    Appointments  Staci Holt MD  [address]  [phone]    Go on 2023  at 3.00pm    Esperanza López, [address]  [address] [address] [phone]    Go on 3/4/2024  at 10:40am    [address] [place] [address], Mary Garcia MD  [facility] [address] [facility] [address]  [phone]    Follow up on 2024  9:40 am     Future Appointments   Date Time Provider [address]   2023  3:00 PM Gómez Cooley MD PMA BS AMB   2024  9:40 AM Mary Dvaey MD CAVSF BS AMB   3/4/2024 10:40 AM Esperanza López APRN - NP NEUROWTCRSPB BS AMB         Please follow up with your PCP regarding:  - blood test results.   - Spinal

## 2023-11-30 ENCOUNTER — TELEPHONE (OUTPATIENT)
Age: 28
End: 2023-11-30

## 2023-11-30 LAB
ALBUMIN SERPL ELPH-MCNC: 3.6 G/DL (ref 2.9–4.4)
ALBUMIN/GLOB SERPL: 0.9 (ref 0.7–1.7)
ALPHA1 GLOB SERPL ELPH-MCNC: 0.2 G/DL (ref 0–0.4)
ALPHA2 GLOB SERPL ELPH-MCNC: 0.8 G/DL (ref 0.4–1)
B-GLOBULIN SERPL ELPH-MCNC: 1.2 G/DL (ref 0.7–1.3)
GAMMA GLOB SERPL ELPH-MCNC: 1.5 G/DL (ref 0.4–1.8)
GLOBULIN SER CALC-MCNC: 3.8 G/DL (ref 2.2–3.9)
M PROTEIN SERPL ELPH-MCNC: NORMAL G/DL
PROT SERPL-MCNC: 7.4 G/DL (ref 6–8.5)

## 2023-11-30 NOTE — TELEPHONE ENCOUNTER
Care Transitions Initial Follow Up Call    Outreach made within 2 business days of discharge: Yes    Patient: Lenka Carr Patient : 1995   MRN: [id number]  Reason for Admission: There are no discharge diagnoses documented for the most recent discharge.   Discharge Date: 23       Spoke with:No answer    [facility]      Scheduled appointment with PCP within 7-14 days    Follow Up  Future Appointments   Date Time Provider [address] [place] [address]   2023  3:00 PM Rom Caballero MD PMA BS AMB   2024  9:40 AM Axel Davey MD CAV[facility] BS AMB   3/4/2024 10:40 AM Melvin López APRN - GEOFFREY NEUROWTCRSPB BS AMB       Hua Lopez

## 2023-11-30 NOTE — TELEPHONE ENCOUNTER
Care Transitions Initial Follow Up Call    Outreach made within 2 business days of discharge: Yes    Patient: Ana Cabrera Patient : 1995   MRN: [id number]  Reason for Admission: Weakness  Discharge Date: 23       Spoke with: patient    [facility]    TCM Interactive Patient Contact:  Was patient able to fill all prescriptions: Yes  Was patient instructed to bring all medications to the follow-up visit: Yes  Is patient taking all medications as directed in the discharge summary? Yes  Does patient understand their discharge instructions: Yes  Does patient have questions or concerns that need addressed prior to 7-14 day follow up office visit: yes - Patient stated that she is having more twitches in her leg when she walks and wanted the provider that she is following up with to be aware.      Scheduled appointment with PCP within 7-14 days    Follow Up  Future Appointments   Date Time Provider [address]   2023  3:00 PM Ralph Angel MD PMA BS AMB   2024  9:40 AM Carolann Davey MD CAV[facility] BS AMB   3/4/2024 10:40 AM Emanuel López Ra, APRN - NP NEUROWTCRSPB BS AMB       Omar Jama

## 2023-12-01 ENCOUNTER — TELEPHONE (OUTPATIENT)
Age: 28
End: 2023-12-01

## 2023-12-01 LAB
14-3-3 ETA AG SER IA-MCNC: <0.2 NG/ML
ACHR AB SER-SCNC: <0.03 NMOL/L (ref 0–0.24)
ACHR BLOCK AB SER-ACNC: 15 % (ref 0–25)
ACHR MOD AB/ACHR TOTAL SFR SER: NORMAL %
ACHR MODULATING AB: NORMAL %
ANTI-MYELIN OLIGODENDROCYTE GLYCOPROTEIN: NEGATIVE
CCP IGA+IGG SERPL IA-ACNC: <20 UNITS
REAGIN AB CSF QL: NON REACTIVE
RHEUMATOID FACT SERPL-ACNC: <14 UNITS/ML
VIT B1 BLD-SCNC: 131.3 NMOL/L (ref 66.5–200)
WNV IGG SPEC QL IA: POSITIVE
WNV IGM CSF QL IA: NEGATIVE

## 2023-12-01 NOTE — TELEPHONE ENCOUNTER
----- Message from CASSANDRA Campos NP sent at 11/29/2023  2:40 PM EST -----  Regarding: needs follow up with Ileana or Kandis in 4 weeks or ASAP

## 2023-12-04 LAB
ACHR AB SER-SCNC: <0.03 NMOL/L (ref 0–0.24)
ACHR BLOCK AB SER-ACNC: 15 % (ref 0–25)
ACHR MOD AB/ACHR TOTAL SFR SER: NORMAL %
ACHR MODULATING AB: 0 % (ref 0–45)

## 2023-12-05 ENCOUNTER — TELEPHONE (OUTPATIENT)
Age: 28
End: 2023-12-05

## 2023-12-05 LAB — OLIGOCLONAL BANDS.IT SER+CSF QL: NORMAL

## 2023-12-05 NOTE — TELEPHONE ENCOUNTER
----- Message from [facility] sent at 12/5/2023  9:45 AM EST -----  Subject: Message to Provider    QUESTIONS  Information for Provider? Delmis Mack a nurse  with [address] [place] [address]   called in to request for the patient's provider to provide the patient   with her name and to let the patient know that she also has free case   management through her insurance company as well. Please contact   [phone] ext [phone] with any questions comment or concerns. ---------------------------------------------------------------------------  --------------  Samara TAYLOR  [phone]; OK to leave message on voicemail  ---------------------------------------------------------------------------  --------------  SCRIPT ANSWERS  Relationship to Patient? Covered Entity  Covered Entity Type? Health Insurance? Representative Name?  Delmis Mack

## 2023-12-06 LAB
B BURGDOR IGG CSF-ACNC: < 0.08 INDEX
B BURGDOR IGM CSF-ACNC: < 0.06 INDEX
ECHO BSA: 2.18 M2

## 2023-12-07 ENCOUNTER — OFFICE VISIT (OUTPATIENT)
Age: 28
End: 2023-12-07

## 2023-12-07 VITALS
HEIGHT: 66 IN | BODY MASS INDEX: 37.61 KG/M2 | HEART RATE: 93 BPM | TEMPERATURE: 98 F | RESPIRATION RATE: 17 BRPM | OXYGEN SATURATION: 99 % | DIASTOLIC BLOOD PRESSURE: 65 MMHG | SYSTOLIC BLOOD PRESSURE: 136 MMHG | WEIGHT: 234 LBS

## 2023-12-07 DIAGNOSIS — Z09 HOSPITAL DISCHARGE FOLLOW-UP: ICD-10-CM

## 2023-12-07 DIAGNOSIS — B34.9 VIRAL ILLNESS: Primary | ICD-10-CM

## 2023-12-07 LAB — MUSK AB SER IA-ACNC: <1 U/ML

## 2023-12-07 RX ORDER — METHYLPREDNISOLONE 4 MG/1
TABLET ORAL
Qty: 21 TABLET | Refills: 0 | Status: SHIPPED | OUTPATIENT
Start: 2023-12-07 | End: 2023-12-13

## 2023-12-11 ENCOUNTER — TELEPHONE (OUTPATIENT)
Age: 28
End: 2023-12-11

## 2023-12-13 ENCOUNTER — TELEPHONE (OUTPATIENT)
Age: 28
End: 2023-12-13

## 2023-12-14 NOTE — TELEPHONE ENCOUNTER
Patient would like a call regarding her FMLA / Leave of absence paperwork that was faxed over to [place] office a little less than 2 weeks ago and it was faxed yesterday from [facility]. Armando stated she will upload in My Chart today.    Patient stated she has not been paid since November 3,2023.      Informed patient is aware disability paperwork can take up to 2 weeks.      Patient has a VV Monday the 18th

## 2023-12-28 ENCOUNTER — TELEPHONE (OUTPATIENT)
Age: 28
End: 2023-12-28

## 2023-12-28 NOTE — TELEPHONE ENCOUNTER
Patient called asking to speak to a manager. She said she has been dealing with a lot of back and forth the last 3 months and just wanted to deal with one person.    [phone]

## 2024-01-02 ENCOUNTER — CLINICAL DOCUMENTATION (OUTPATIENT)
Age: 29
End: 2024-01-02

## 2024-01-02 ENCOUNTER — TELEPHONE (OUTPATIENT)
Age: 29
End: 2024-01-02

## 2024-01-02 NOTE — TELEPHONE ENCOUNTER
Called and verified patient with 2 identifiers. Informed patient that I got their TOMODOt message regarding issues with scheduling appointment. Informed her I see that she needs an EMG scheduled with Dr. Ward - scheduled patient for 02/15/24.     Informed her that I faxed a referral to [facility] on 12/20/2023 but pt stated she called to schedule and their office did not see an order in. Currently see referral in for neurology for Cordell Rosales MD. Re-faxed referral to [phone]. Received fax confirmation. Will inform provider.

## 2024-01-03 LAB — ECHO BSA: 2.18 M2

## 2024-01-04 ENCOUNTER — TELEPHONE (OUTPATIENT)
Age: 29
End: 2024-01-04

## 2024-01-04 NOTE — TELEPHONE ENCOUNTER
Patient called stating she has an order in for ANS testing from Ileana Brooks, and she wants Dr. Rosales to do the test. Please give her a call to get this scheduled. Thank you!

## 2024-01-09 NOTE — TELEPHONE ENCOUNTER
Submitted online for PA ans testing [id number]  No pa required        Hold prior to testing  No meds

## 2024-01-11 ENCOUNTER — OFFICE VISIT (OUTPATIENT)
Age: 29
End: 2024-01-11
Payer: COMMERCIAL

## 2024-01-11 VITALS
BODY MASS INDEX: 38.25 KG/M2 | HEART RATE: 83 BPM | DIASTOLIC BLOOD PRESSURE: 80 MMHG | SYSTOLIC BLOOD PRESSURE: 120 MMHG | HEIGHT: 66 IN | OXYGEN SATURATION: 97 % | WEIGHT: 238 LBS

## 2024-01-11 DIAGNOSIS — R42 DIZZY: Primary | ICD-10-CM

## 2024-01-11 DIAGNOSIS — M62.81 MUSCULAR WEAKNESS: ICD-10-CM

## 2024-01-11 DIAGNOSIS — R55 SYNCOPE, UNSPECIFIED SYNCOPE TYPE: ICD-10-CM

## 2024-01-11 LAB
CRP SERPL-MCNC: 1.17 MG/DL (ref 0–0.6)
ERYTHROCYTE [SEDIMENTATION RATE] IN BLOOD: 30 MM/HR (ref 0–20)

## 2024-01-11 PROCEDURE — 99243 OFF/OP CNSLTJ NEW/EST LOW 30: CPT | Performed by: INTERNAL MEDICINE

## 2024-01-11 PROCEDURE — 93000 ELECTROCARDIOGRAM COMPLETE: CPT | Performed by: INTERNAL MEDICINE

## 2024-01-11 NOTE — PROGRESS NOTES
Chief Complaint   Patient presents with    Dizziness    Other     HOLTER F/U     Vitals:    01/11/24 0951   Weight: 108 kg (238 lb)   Height: 1.676 m (5' 6\")      Ht 1.676 m (5' 6\")   Wt 108 kg (238 lb)   BMI 38.41 kg/m²

## 2024-01-11 NOTE — PROGRESS NOTES
Reason to see patient: Syncope.     Referring: Mima Sotelo MD    HPI: Armando Jose is a 28 y.o. female with past medical history significant for asthma, depression, IBS, is here for evaluation.  In August 2023 she had 2 episodes of passing out in interval of 2 weeks.  She also has generalized tremors and involuntary movements and has been using a walker in the last few months.  She was admitted to the [facility] for the symptoms back in November 2023.  She underwent a Holter monitor back in November.  I was able to personally review the results of the Holter monitor for 30 days.  She is also undergoing ANS testing with neurology.  From the symptom standpoint she does not have any symptoms of chest pain however she feels symptoms of palpitations which last for few seconds to few minutes and spontaneously resolved.    EKG in my office today demonstrated normal sinus rhythm, normal axis, normal intervals, normal ST segment.    Holter monitor was personally reviewed.  Holter monitor demonstrated less than 1% PACs and less than 1% PVCs with the lowest heart rate of 50 bpm and highest heart rate of 130 bpm.    Plan    1.  Palpitations: Symptoms consistent with SVTs or tachycardia however Holter for 30 days did not demonstrate any significant episodes the highest heart rate recorded was 130 beats minute minute in sinus rhythm.  Only 1% PVCs and PACs.  Echocardiogram was normal on November 27, 2023.    2.  Syncopal episode: Echo and Holter are normal.  It appears to be more of a vasovagal or autonomic dysfunction related syncope.  She is undergoing ANS testing with neurology.    3.  Generalized muscle weakness along with generalized tremors: Undergoing neurological workup.    4. See Dr. Davey as needed.     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   Mother is a .       ATTENTION:   This medical record was transcribed using an electronic medical records/speech recognition system.

## 2024-01-14 LAB — ALDOLASE SERPL-CCNC: 4.3 U/L (ref 3.3–10.3)

## 2024-01-19 ENCOUNTER — PROCEDURE VISIT (OUTPATIENT)
Age: 29
End: 2024-01-19

## 2024-01-19 DIAGNOSIS — R42 LIGHTHEADED: Primary | ICD-10-CM

## 2024-01-19 NOTE — PROGRESS NOTES
[facility]  [address], [address]  [place] [address]    Patient ID:  Armando Jose  [id number]  28 y.o.  1995     REFERRED BY: Ileana Brooks NP  PCP: Mima Sotelo MD    Date of Testin2024       Indication/History:    Episodes of weakness, tremors and lightheadedness (+) gastroparesis    Patient is coming for syncope/autonomic dysfunction evaluation.    Medications taken 48 hrs before the test: None     Procedure: This Autonomic Nervous System (ANS) testing is performed by utilizing [employer] Test Work Autonomic System, with established protocol.  Multiple procedures performed: Heart rate response to deep breathing (HRDB), Valsalva ratio, Heart rate and BP response to head up tilt (HUT), and Quantitative sudomotor axon reflex testing (QSWEAT) .     Result:  Heart response to deep  breathing (HRDB): 2 series of 6 cycles were performed and the mean of 6 consecutive cycles was obtained. Average HR difference was 23.16 and E:I ratio was 1.35. Normal response    Heart rate response to Valsalva maneuver:  Rrwx-ma-emka BP to Valsalva was measured and BP response in all 4 phases was normal. Heart response was the opposite of BP, a normal response. ( VR = 2.49 )  HUT (head-up tilt) : Kteg-nr-fqib BP and HR were measured, up to 15 minutes post tilt.  No significant BP reduction or HR acceleration/deceleration.  SUDOMOTOR: QSWEAT response showed relatively preserved sweat production in all 4 localities (forearm, proximal leg, distal leg, foot) of the right upper and lower limbs, comparing patient to age group.     Impression:   NORMAL    Relatively preserved autonomic function for this age, despite patient reported weakness, dizziness and shortness of breath during tilt table testing.         Cordell Rosales MD  Diplomate, American Board of Psychiatry and Neurology  Diplomate, Neuromuscular Medicine  Diplomate, American Board of Electrodiagnostic Medicine    Note: Raw Data

## 2024-01-22 ENCOUNTER — TELEMEDICINE (OUTPATIENT)
Age: 29
End: 2024-01-22
Payer: COMMERCIAL

## 2024-01-22 DIAGNOSIS — M62.81 MUSCULAR WEAKNESS: Primary | ICD-10-CM

## 2024-01-22 DIAGNOSIS — R55 SYNCOPE AND COLLAPSE: ICD-10-CM

## 2024-01-22 DIAGNOSIS — K31.84 GASTROPARESIS: ICD-10-CM

## 2024-01-22 PROCEDURE — 99214 OFFICE O/P EST MOD 30 MIN: CPT | Performed by: NURSE PRACTITIONER

## 2024-01-22 NOTE — PROGRESS NOTES
[facility]  [facility]  [address]  [address]  [address]  [facility]  [address]  [phone] (phone)   [phone] (fax)  Tele-health Visit      Date:  2024    Name:  ARMANDO GONZALEZ  :  1995  MRN:  [id number]     PCP:  Mima Sotelo MD    Armando Gonzalez is a 28 y.o. female who was seen by synchronous (real-time) audio-video technology on 2024 for Follow-up (Weakness and tremors, follow up on test/studies.)      Assessment & Plan:   1. Muscular weakness  Assessment & Plan:  Patient describes symptoms of feeling dizzy, and then overheated, this will progress to weakness with episodes of forgetfulness and headaches.  Symptoms started in August but progressed to the point where she can no longer work.     She has had extensive work-up neuroimaging including MRI Brain, cervical spine, thoracic spine, and lumbar spine which do not offer an explanation for her symptoms.  Blood work including routine studies as well as autoimmune which are largely unremarkable except for sed rate mildly elevated at 35 and a CRP mildly elevated at 1.43 she also had spinal tap. West Nile IgG was positive however her IgM was negative.  WBCs were 4 glucose and protein WNL.    2024: ANS Testing: NORMAL: Relatively preserved autonomic function for this age, despite patient reported weakness, dizziness and shortness of breath during tilt table testing.  2024: Aldolase WNL  Component  Ref Range & Units 24 1117 23 1658   CRP  0.00 - 0.60 mg/dL 1.17 High   1.43 High  CM      Component  Ref Range & Units 24 1117 23 1658   Sed Rate, Automated  0 - 20 mm/hr 30 High   35 High       1.  Her CRP and sed rate are trending down.  To ensure they continue to trend down we will replete blood work on February 15 after she has her EMG nerve conduction studies.  Will also add on a rheumatoid factor and CCP and CK.  Pending results may need to consider referral to

## 2024-01-22 NOTE — PATIENT INSTRUCTIONS
Armando,    It was a pleasure seeing you in clinic today.    Your tremors appear to be somewhat better, however you are not where you need to be.    Per discussion I have reordered sed rate and CRP to ensure they are continuing to trend down.    You have your EMG nerve conduction study scheduled for February 15 by Dr. Ward.  At that time he will also perform an exam.    I have also placed an order for physical therapy with [facility] and did suggest they evaluate you for appropriateness to undergo water therapy.    I will see you in follow-up on February 26 at 12 PM, and you do have in-person follow-up with Giorgio López NP March 4, 2024 at 10:40 AM.  Please keep this appointment as it is in-person and you will benefit from an in person physical exam.    I will also reach out to my office about your short-term disability paperwork.    Office Policies    Phone calls/patient messages:  Please allow up to 24 hours for someone in the office to contact you about your call or message. Be mindful your provider may be out of the office or your message may require further review. We encourage you to use [employer] for your messages as this is a faster, more efficient way to communicate with our office    Medication Refills:  Prescription medications require up to 48 business hours to process. We encourage you to use [employer] for your refills.     For controlled medications: Please allow up to 72 business hours to process. Certain medications may require you to  a written prescription at our office.    NO narcotic/controlled medications will be prescribed after 4pm Monday through Friday or on weekends    Form/Paperwork Completion:  We ask that you allow 7-14 business days. You may also download your forms to [employer] to have your doctor print off.

## 2024-01-23 ENCOUNTER — CLINICAL DOCUMENTATION (OUTPATIENT)
Age: 29
End: 2024-01-23

## 2024-01-23 NOTE — PROGRESS NOTES
[facility] visit note, insurance, and demographics. Received fax confirmation.     Mailed AVS, copy of referral, and lab work to patient's address listed in chart.

## 2024-01-24 NOTE — ASSESSMENT & PLAN NOTE
Patient states she has been diagnosed with gastroparesis.  She states she is undergone extensive evaluation without a definitive diagnosis.

## 2024-01-24 NOTE — ASSESSMENT & PLAN NOTE
She endorsed syncopal event with a prodrome of feeling hot and lightheaded.  She had brief loss of consciousness (under 1 minute) with return to baseline.  Workup for etiology largely unremarkable    -01/19/2024: ANS Testing: NORMAL: Relatively preserved autonomic function for this age, despite patient reported weakness, dizziness and shortness of breath during tilt table testing.  -11/29/2023: Cardiac Event Monitor: Patient monitored for 28d 8h 47m13 events were transmitted. 9 patient triggered; 4 auto triggered. 10,777 PACs with PAC burden of <1%. 17,272 PVCs with PVC burden of 1%.  -11/27/2023: Echo:  EF of 55 - 60%. Left ventricle size is normal. Normal wall thickness. Normal wall motion. Normal diastolic function.  -01/11/2024: Seen in follow-up by cardiology.  Having palpitations consistent with SVTs Holter did not demonstrate any significant episodes. Who felt syncopal episodes more likely vasovagal or autonomic dysfunction related to syncope    1.  No recurrent syncopal events since her last visit.  Will continue to monitor.

## 2024-01-24 NOTE — ASSESSMENT & PLAN NOTE
Patient describes symptoms of feeling dizzy, and then overheated, this will progress to weakness with episodes of forgetfulness and headaches.  Symptoms started in August but progressed to the point where she can no longer work.     She has had extensive work-up neuroimaging including MRI Brain, cervical spine, thoracic spine, and lumbar spine which do not offer an explanation for her symptoms.  Blood work including routine studies as well as autoimmune which are largely unremarkable except for sed rate mildly elevated at 35 and a CRP mildly elevated at 1.43 she also had spinal tap. West Nile IgG was positive however her IgM was negative.  WBCs were 4 glucose and protein WNL.    01/19/2024: ANS Testing: NORMAL: Relatively preserved autonomic function for this age, despite patient reported weakness, dizziness and shortness of breath during tilt table testing.  1/11/2024: Aldolase WNL  Component  Ref Range & Units 1/11/24 1117 11/27/23 1658   CRP  0.00 - 0.60 mg/dL 1.17 High   1.43 High  CM      Component  Ref Range & Units 1/11/24 1117 11/27/23 1658   Sed Rate, Automated  0 - 20 mm/hr 30 High   35 High       1.  Her CRP and sed rate are trending down.  To ensure they continue to trend down we will replete blood work on February 15 after she has her EMG nerve conduction studies.  Will also add on a rheumatoid factor and CCP and CK.  Pending results may need to consider referral to rheumatology  2.  EMGs will be obtained February 15, 2024 by Dr. Ward  3.  Refer to physical therapy for strengthening and gait training.  Patient may also benefit from water therapy  4.  Further recommendations pending results of the EMGs and additional blood work.

## 2024-01-29 ENCOUNTER — PATIENT MESSAGE (OUTPATIENT)
Age: 29
End: 2024-01-29

## 2024-01-30 ENCOUNTER — CLINICAL DOCUMENTATION (OUTPATIENT)
Age: 29
End: 2024-01-30

## 2024-01-30 NOTE — PROGRESS NOTES
Received Disability and Leave Provider Statement. Will notify provider and give to complete and sign.

## 2024-02-07 ENCOUNTER — CLINICAL DOCUMENTATION (OUTPATIENT)
Age: 29
End: 2024-02-07

## 2024-02-07 NOTE — PROGRESS NOTES
Faxed disability paperwork and office notes from 01/22/24 to present as requested on document. Sent to [place] at [phone]. Received fax confirmation. Sent to  for scanning. Informed patient via [employer] message.

## 2024-02-15 ENCOUNTER — PROCEDURE VISIT (OUTPATIENT)
Age: 29
End: 2024-02-15
Payer: COMMERCIAL

## 2024-02-15 DIAGNOSIS — R20.0 NUMBNESS AND TINGLING: ICD-10-CM

## 2024-02-15 DIAGNOSIS — M62.81 MUSCULAR WEAKNESS: ICD-10-CM

## 2024-02-15 DIAGNOSIS — R20.2 NUMBNESS AND TINGLING: ICD-10-CM

## 2024-02-15 DIAGNOSIS — M62.81 MUSCULAR WEAKNESS: Primary | ICD-10-CM

## 2024-02-15 DIAGNOSIS — R26.9 GAIT DIFFICULTY: ICD-10-CM

## 2024-02-15 DIAGNOSIS — R53.1 WEAKNESS: ICD-10-CM

## 2024-02-15 LAB
CRP SERPL-MCNC: 1.38 MG/DL (ref 0–0.3)
ERYTHROCYTE [SEDIMENTATION RATE] IN BLOOD: 31 MM/HR (ref 0–20)

## 2024-02-15 PROCEDURE — 95886 MUSC TEST DONE W/N TEST COMP: CPT | Performed by: PSYCHIATRY & NEUROLOGY

## 2024-02-15 PROCEDURE — 95912 NRV CNDJ TEST 11-12 STUDIES: CPT | Performed by: PSYCHIATRY & NEUROLOGY

## 2024-02-15 NOTE — PROGRESS NOTES
ELECTRODIANOSTIC REPORT  Test Date:  2024    Patient: Armando Jose : 1995 Physician: Dr. Ward   Sex: female Tech: Kim Rojas, EMG Technician  Ref Phys: Ileana Brooks NP     CHIEF COMPLAINTS:  Patient is a 28 year-old female who presents with sensory disturbance, weakness and balance difficulties in her upper and lower extremity.  Strength was full.  She does ambulate with a walker for stability    EMG & NCV Findings:      Nerve conduction studies as listed below in the right upper and lower extremity were within reference of normal.    Disposable concentric needle examination of the muscles listed below in the right upper and lower extremity was normal.    Interpretation:    This study was normal.  There was no electrodiagnostic evidence upon today’s examination suggesting a focal or generalized large fiber neuropathy, myopathy, or cervical/lumbar radiculopathy.     __________________  Robles Ward D.O. FAAN        Nerve Conduction Studies  Anti Sensory Summary Table     Stim Site NR Peak (ms) Norm Peak (ms) O-P Amp (µV) Norm O-P Amp Site1 Site2 Dist (cm)   Right Median Anti Sensory (2nd Digit)  34.8 °C   Wrist    2.9 <4 46.6 >11 Wrist 2nd Digit 14.0   Right Radial Anti Sensory (Base 1st Digit)  34.8 °C   Wrist    2.1 <2.9 34.4 >15 Wrist Base 1st Digit 10.0   Right Sup Fibular Anti Sensory (Lat ankle)  34.8 °C   Lower leg    2.8 <4.6 20.2 >4 Lower leg Lat ankle 10.0   Right Sural Anti Sensory (Lat Mall)  34.8 °C   Calf    2.9 <4.4 21.4 >6 Calf Lat Mall 14.0   Right Ulnar Anti Sensory (5th Digit)  34.8 °C   Wrist    2.7 <4.0 27.4 >10 Wrist 5th Digit 14.0     Motor Summary Table     Stim Site NR Onset (ms) Norm Onset (ms) O-P* Amp (mV) Norm O-P Amp P-T Amp (mV) Site1 Site2 Dist (cm) Jorge (m/s)   Right Fibular Motor (Ext Dig Brev)  34.8 °C   Ankle    3.4 <6.5 10.5 >1.1  Ankle Ext Dig Brev 8.0    B Fib    10.2  8.5   B Fib Ankle 34.0 50   Poplt    12.0  8.5   Poplt B Fib 10.0 56   Right Median Motor (Abd

## 2024-02-17 LAB
CCP IGA+IGG SERPL IA-ACNC: 6 UNITS (ref 0–19)
RHEUMATOID FACT SERPL-ACNC: <10 IU/ML

## 2024-02-26 ENCOUNTER — TELEMEDICINE (OUTPATIENT)
Age: 29
End: 2024-02-26
Payer: COMMERCIAL

## 2024-02-26 DIAGNOSIS — R55 SYNCOPE AND COLLAPSE: ICD-10-CM

## 2024-02-26 DIAGNOSIS — M62.81 MUSCULAR WEAKNESS: Primary | ICD-10-CM

## 2024-02-26 DIAGNOSIS — K31.84 GASTROPARESIS: ICD-10-CM

## 2024-02-26 PROCEDURE — 99214 OFFICE O/P EST MOD 30 MIN: CPT | Performed by: NURSE PRACTITIONER

## 2024-02-26 RX ORDER — ESOMEPRAZOLE MAGNESIUM 40 MG/1
1 CAPSULE, DELAYED RELEASE ORAL DAILY
COMMUNITY
Start: 2024-01-18

## 2024-02-26 NOTE — PROGRESS NOTES
[facility]  [facility]  [address]  [address]  [address]  [facility]  [address]  [phone] (phone)   [phone] (fax)  Tele-health Visit      Date:  24     Name:  ARMANDO GONZALEZ  :  1995  MRN:  [id number]     PCP:  Mima Sotelo MD    Armando Gonzalez is a 28 y.o. female who was seen by synchronous (real-time) audio-video technology on 2024 for Follow-up (Weakness and tremors, follow up on test/studies)      Assessment & Plan:   1. Muscular weakness  Assessment & Plan:  Patient describes symptoms of feeling dizzy, and then overheated, this will progress to weakness with episodes of forgetfulness and headaches.  Symptoms started in August but progressed to the point where she can no longer work.     She has had extensive work-up neuroimaging including MRI Brain, cervical spine, thoracic spine, and lumbar spine which do not offer an explanation for her symptoms.  Blood work including routine studies as well as autoimmune which are largely unremarkable except for sed rate mildly elevated at 35 and a CRP mildly elevated at 1.43 she also had spinal tap. West Nile IgG was positive however her IgM was negative.  WBCs were 4 glucose and protein WNL.     EE2023: Normal awake and drowsy EEG. Clinical correlation is necessary.      2024: ANS testing:  Impression: NORMAL Relatively preserved autonomic function for this age, despite patient reported weakness, dizziness and shortness of breath during tilt table testing.    2/15/2024: EMG/NCVs: This study was normal.  There was no electrodiagnostic evidence upon today’s examination suggesting a focal or generalized large fiber neuropathy, myopathy, or cervical/lumbar radiculopathy.     Component  Ref Range & Units 2/15/24 0858 24 1117 23 1658   CRP  0.00 - 0.30 mg/dL 1.38 High   1.17 High  R, CM  1.43 High  R, CM        Component  Ref Range & Units 2/15/24 0858 24 1117 23 1658

## 2024-02-26 NOTE — ASSESSMENT & PLAN NOTE
Patient describes symptoms of feeling dizzy, and then overheated, this will progress to weakness with episodes of forgetfulness and headaches.  Symptoms started in August but progressed to the point where she can no longer work.     She has had extensive work-up neuroimaging including MRI Brain, cervical spine, thoracic spine, and lumbar spine which do not offer an explanation for her symptoms.  Blood work including routine studies as well as autoimmune which are largely unremarkable except for sed rate mildly elevated at 35 and a CRP mildly elevated at 1.43 she also had spinal tap. West Nile IgG was positive however her IgM was negative.  WBCs were 4 glucose and protein WNL.     EE2023: Normal awake and drowsy EEG. Clinical correlation is necessary.      2024: ANS testing:  Impression: NORMAL Relatively preserved autonomic function for this age, despite patient reported weakness, dizziness and shortness of breath during tilt table testing.    2/15/2024: EMG/NCVs: This study was normal.  There was no electrodiagnostic evidence upon today’s examination suggesting a focal or generalized large fiber neuropathy, myopathy, or cervical/lumbar radiculopathy.     Component  Ref Range & Units 2/15/24 0858 24 1117 23 1658   CRP  0.00 - 0.30 mg/dL 1.38 High   1.17 High  R, CM  1.43 High  R, CM        Component  Ref Range & Units 2/15/24 0858 24 1117 23 1658   Sed Rate, Automated  0 - 20 mm/hr 31 High   30 High   35 High       10/31/2023: Methylmalonic acid level: 80  2023:   2023: AcHR 0, Musk 1.0  2023: Autoimmune Profile: negative  2024: Aldolase: 4.3  02/15/2024: Rheumatoid factor less than 10  02/15/2024: CCP antibodies IgG/IgA: 6    Spoke with patient today about she has neurological symptoms, however she may not have a primary underlying neurological diagnosis.    1.  She has follow-up scheduled in the neurology clinic  at 11 AM.  She needs to

## 2024-02-27 ENCOUNTER — CLINICAL DOCUMENTATION (OUTPATIENT)
Age: 29
End: 2024-02-27

## 2024-02-27 ENCOUNTER — OFFICE VISIT (OUTPATIENT)
Age: 29
End: 2024-02-27
Payer: COMMERCIAL

## 2024-02-27 VITALS
OXYGEN SATURATION: 95 % | DIASTOLIC BLOOD PRESSURE: 75 MMHG | SYSTOLIC BLOOD PRESSURE: 133 MMHG | RESPIRATION RATE: 16 BRPM | HEART RATE: 89 BPM

## 2024-02-27 DIAGNOSIS — R42 DIZZINESS: ICD-10-CM

## 2024-02-27 DIAGNOSIS — M62.81 MUSCULAR WEAKNESS: Primary | ICD-10-CM

## 2024-02-27 DIAGNOSIS — M62.81 MUSCULAR WEAKNESS: ICD-10-CM

## 2024-02-27 DIAGNOSIS — R55 DROP ATTACK: ICD-10-CM

## 2024-02-27 PROCEDURE — 99215 OFFICE O/P EST HI 40 MIN: CPT | Performed by: NURSE PRACTITIONER

## 2024-02-27 RX ORDER — PROMETHAZINE HYDROCHLORIDE 25 MG/1
25 TABLET ORAL EVERY 6 HOURS PRN
Qty: 30 TABLET | Refills: 5 | Status: SHIPPED | OUTPATIENT
Start: 2024-02-27 | End: 2024-03-28

## 2024-02-27 ASSESSMENT — PATIENT HEALTH QUESTIONNAIRE - PHQ9
SUM OF ALL RESPONSES TO PHQ QUESTIONS 1-9: 1
2. FEELING DOWN, DEPRESSED OR HOPELESS: 1
1. LITTLE INTEREST OR PLEASURE IN DOING THINGS: 0
SUM OF ALL RESPONSES TO PHQ9 QUESTIONS 1 & 2: 1

## 2024-02-27 NOTE — PROGRESS NOTES
Armando Jose is a 28 y.o. female who presents with the following  Chief Complaint   Patient presents with    Follow-up     Patient is here for muscle weakness       HPI      Patient comes in with mother for follow-up for muscle weakness  She also has multiple falls where she states that she loses all muscle movement and functioning and then will fall to the ground  She is not really sure what leads up to best  Symptoms continue to get worse  She was sent to rheumatology  She was and then hospital in November  Had an MRI of the brain, cervical spine, thoracic spine and lumbar spine which were all reviewed  She had an EMG by a neuromuscular specialist  She had ANS testing  All of which was normal    She feels like she will continue to have symptoms where she jerks and twitches  She had a traditional EEG which was read as normal    Catch a spell though  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   It started in 10/23.   Weakness started first then she fainted a few times  then the body tremors.   She will have seizure like tremors/ shaking.   Patient hasn't had any more fainting spells but will feel like she's going to from time to time.   Patient will also get the hot flushing over her hole body.   Patient can do things for a few mins before she will feel weak and like she will fall to the floor.   Patient uses a walker for long walks.   Patient is having headaches daily and she is also having some memory problems.   Patient's mother states this is out of the norm , that she will have Word finding problems.          Allergies   Allergen Reactions    Latex Other (See Comments), Rash and Hives    Banana Itching     Throat itching    Throat itching  Throat itching  Throat itching    Kiwi Extract Anaphylaxis, Other (See Comments) and Swelling    Peanut-Containing Drug Products Itching and Other (See

## 2024-02-27 NOTE — PROGRESS NOTES
Faxed rheumatology referral to Dr. Alexandra Abbasi at [phone] with last office visit note, demographics, and insurance information. Received fax confirmation.

## 2024-03-01 LAB — AQP4 H2O CHANNEL IGG SERPL IA-ACNC: <1.5 U/ML (ref 0–3)

## 2024-03-04 LAB
ACHR AB SER-SCNC: <0.03 NMOL/L (ref 0–0.24)
ACHR BLOCK AB SER-ACNC: 25 % (ref 0–25)
ACHR MOD AB/ACHR TOTAL SFR SER: NORMAL %
ACHR MODULATING AB: 0 % (ref 0–45)

## 2024-03-05 ENCOUNTER — PATIENT MESSAGE (OUTPATIENT)
Age: 29
End: 2024-03-05

## 2024-03-05 ENCOUNTER — HOSPITAL ENCOUNTER (OUTPATIENT)
Facility: HOSPITAL | Age: 29
Discharge: HOME | End: 2024-03-08
Payer: COMMERCIAL

## 2024-03-05 DIAGNOSIS — R55 DROP ATTACK: ICD-10-CM

## 2024-03-05 DIAGNOSIS — M62.81 MUSCULAR WEAKNESS: ICD-10-CM

## 2024-03-05 PROCEDURE — 6360000004 HC RX CONTRAST MEDICATION: Performed by: NURSE PRACTITIONER

## 2024-03-05 PROCEDURE — 70496 CT ANGIOGRAPHY HEAD: CPT

## 2024-03-05 RX ADMIN — IOPAMIDOL 100 ML: 755 INJECTION, SOLUTION INTRAVENOUS at 08:04

## 2024-03-06 ENCOUNTER — TELEPHONE (OUTPATIENT)
Age: 29
End: 2024-03-06

## 2024-03-06 NOTE — TELEPHONE ENCOUNTER
Called patient, IMELDA,that the rheumatology office that Giorgio had referred you to  does not contact patients to schedule appointment and would like the patients to call to schedule appointments. You can call and schedule an appoint and their phone number is [phone].

## 2024-03-12 NOTE — TELEPHONE ENCOUNTER
Kenia with [facility] just called to say they have not yet received an order for evaluation and treatment for aquatic therapy for leg weakness. She states this needs to have diagnosis and signature of the provider.     She is asking if we can fax it to . They state they cannot see the patient until we have it. If you need to call, please call .

## 2024-03-13 ENCOUNTER — TELEPHONE (OUTPATIENT)
Age: 29
End: 2024-03-13

## 2024-03-13 NOTE — TELEPHONE ENCOUNTER
Patient is requesting a call regarding her referral for [facility] for a second opinion because no one has reached out to her from [facility].

## 2024-03-20 NOTE — TELEPHONE ENCOUNTER
Called patient, LM, referral to [facility] has been faxed and received confirmation. If you do not hear from them in a few business days you can call them at [phone].  Sent a eCullet message as well.

## 2024-03-22 ENCOUNTER — TELEPHONE (OUTPATIENT)
Age: 29
End: 2024-03-22

## 2024-03-22 NOTE — TELEPHONE ENCOUNTER
Called patient and left voicemail that GEOFFREY Brooks had filled out her disability paperwork. Stated I saw in the chart she was most recently seen by GEOFFREY Galvez at the [place] location and he had completed paperwork yesterday. Advised to call back if she wants us to send paperwork. Will give to  for scanning.

## 2024-03-27 ENCOUNTER — TELEPHONE (OUTPATIENT)
Age: 29
End: 2024-03-27

## 2025-03-31 RX ORDER — PROMETHAZINE HYDROCHLORIDE 25 MG/1
25 TABLET ORAL EVERY 6 HOURS PRN
Qty: 30 TABLET | Refills: 5 | OUTPATIENT
Start: 2025-03-31